# Patient Record
Sex: FEMALE | Race: WHITE | NOT HISPANIC OR LATINO | Employment: FULL TIME | URBAN - METROPOLITAN AREA
[De-identification: names, ages, dates, MRNs, and addresses within clinical notes are randomized per-mention and may not be internally consistent; named-entity substitution may affect disease eponyms.]

---

## 2017-03-09 ENCOUNTER — ALLSCRIPTS OFFICE VISIT (OUTPATIENT)
Dept: OTHER | Facility: OTHER | Age: 52
End: 2017-03-09

## 2017-03-15 ENCOUNTER — GENERIC CONVERSION - ENCOUNTER (OUTPATIENT)
Dept: OTHER | Facility: OTHER | Age: 52
End: 2017-03-15

## 2017-03-15 LAB
BUN SERPL-MCNC: 13 MG/DL
CHOLEST SERPL-MCNC: 198 MG/DL
CREAT SERPL-MCNC: 0.82 MG/DL
GLUCOSE SERPL-MCNC: 82 MG/DL
HDLC SERPL-MCNC: 64 MG/DL
LDLC SERPL CALC-MCNC: 110 MG/DL
POTASSIUM SERPL-SCNC: 4.5 MMOL/L
SODIUM SERPL-SCNC: 144 MMOL/L
TRIGL SERPL-MCNC: 119 MG/DL

## 2017-03-27 ENCOUNTER — TRANSCRIBE ORDERS (OUTPATIENT)
Dept: ADMINISTRATIVE | Facility: HOSPITAL | Age: 52
End: 2017-03-27

## 2017-03-27 DIAGNOSIS — E04.9 ENLARGEMENT OF THYROID: Primary | ICD-10-CM

## 2017-03-27 DIAGNOSIS — R13.10 PROBLEMS WITH SWALLOWING AND MASTICATION: ICD-10-CM

## 2017-04-03 ENCOUNTER — HOSPITAL ENCOUNTER (OUTPATIENT)
Dept: ULTRASOUND IMAGING | Facility: HOSPITAL | Age: 52
Discharge: HOME | End: 2017-04-03
Payer: COMMERCIAL

## 2017-04-03 DIAGNOSIS — R13.10 PROBLEMS WITH SWALLOWING AND MASTICATION: ICD-10-CM

## 2017-04-03 DIAGNOSIS — E04.9 ENLARGEMENT OF THYROID: ICD-10-CM

## 2017-04-03 DIAGNOSIS — E04.9 NON-TOXIC GOITER: ICD-10-CM

## 2017-04-03 PROCEDURE — 76536 US EXAM OF HEAD AND NECK: CPT

## 2017-04-25 ENCOUNTER — GENERIC CONVERSION - ENCOUNTER (OUTPATIENT)
Dept: OTHER | Facility: OTHER | Age: 52
End: 2017-04-25

## 2017-08-07 ENCOUNTER — ALLSCRIPTS OFFICE VISIT (OUTPATIENT)
Dept: OTHER | Facility: OTHER | Age: 52
End: 2017-08-07

## 2017-11-09 ENCOUNTER — OFFICE VISIT (OUTPATIENT)
Dept: URGENT CARE | Facility: CLINIC | Age: 52
End: 2017-11-09
Payer: COMMERCIAL

## 2017-11-09 PROCEDURE — 99213 OFFICE O/P EST LOW 20 MIN: CPT

## 2017-11-11 NOTE — PROGRESS NOTES
Assessment    1  Allergic dermatitis (162 9) (L23 9)    Discussion/Summary  Discussion Summary:   Follow up with the PCPmeds zyrtec or allegra dailybenadryl at nightcream cerve to the body  Medication Side Effects Reviewed: Possible side effects of new medications were reviewed with the patient/guardian today  Understands and agrees with treatment plan: The treatment plan was reviewed with the patient/guardian  The patient/guardian understands and agrees with the treatment plan   Counseling Documentation With Imm: The patient was counseled regarding instructions for management,-- patient and family education,-- importance of compliance with treatment  Follow Up Instructions: Follow Up with your Primary Care Provider in 1-2 days  If your symptoms worsen, go to the nearest [place] [address] Emergency Department  Chief Complaint    1  Rash  Chief Complaint Free Text Note Form: Pt states she was at the bed and breakfast October 1  Since the she feels she has a rash on hands , arms, legs and abdomen  History of Present Illness  HPI: 47 yo female who is here today for constant itching since the end of September after being at a bed and breakfast and has been scratching and itching for the past 1 month  She is non stop scratching  Her  has no scratching or itching noted, She slept in her daughters bed the other night and daughter came home and states her daughter is itching  pt has no signs of scabies, blisters, welts, bed bugs noted or other skin infection noted at this time  Hospital Based Practices Required Assessment:  Pain Assessment  the patient states they do not have pain  (on a scale of 0 to 10, the patient rates the pain at 0 )  Abuse And Domestic Violence Screen   Yes, the patient is safe at home  -- The patient states no one is hurting them  Depression And Suicide Screen  No, the patient has not had thoughts of hurting themself  No, the patient has not felt depressed in the past 7 days  Prefered Language is  [place]  Primary Language is  English  Rash: Sally Severino presents with complaints of rash  Review of Systems  Focused-Female:  Constitutional: as noted in HPI  Integumentary: itching-- and-- dry skin, but-- as noted in HPI-- and-- no rashes  ROS Reviewed:   ROS reviewed  Active Problems  1  Active advance directive (V49 89) (Z78 9)   2  Chronic pain (338 29) (G89 29)   3  GERD without esophagitis (530 81) (K21 9)   4  IBS (irritable bowel syndrome) (564 1) (K58 9)   5  Iron deficiency (280 9) (E61 1)   6  Denied: History of Mental health problem   7  Migraine headache (346 90) (G43 909)   8  Otitis media (382 9) (H66 90)   9  Retinitis pigmentosa (362 74) (H35 52)   10  Denied: History of Substance abuse   11  Supraventricular tachycardia (427 89) (I47 1)   12  Vaginal candidiasis (112 1) (B37 3)    Past Medical History  1  Acute nasopharyngitis (460) (J00)   2  History of Bronchitis (490) (J40)   3  History of Dysphagia (787 20) (R13 10)   4  History of Hip pain (719 45) (M25 559)   5  History of Labyrinthitis (386 30) (H83 09)   6  History of Laryngitis (464 00) (J04 0)   7  Denied: History of Mental health problem   8  History of Overweight (278 02) (E66 3)   9  Denied: History of Substance abuse  Active Problems And Past Medical History Reviewed: The active problems and past medical history were reviewed and updated today  Family History  Mother    1  Family history of Lung cancer   2  No family history of mental disorder  Father    3  No family history of mental disorder  Sister    4  Family history of Substance abuse in family  Grandfather    5  Family history of Aortic Valve Replacement  Family History Reviewed: The family history was reviewed and updated today         Social History   · Active advance directive (V49 89) (Z78 9)   · Dental care, regularly   · Lives with spouse   · Living Situation: Supportive and safe   ·    · Never a smoker   · History of Never a smoker   · No alcohol use   · Non-smoker (V49 89) (Z78 9)   · Occupation   · Racial background   · Social drinker (V49 89) (Z78 9)  Social History Reviewed: The social history was reviewed and updated today  The social history was reviewed and is unchanged  Surgical History    1  History of Anal Sphincteroplasty   2  History of Hysterectomy   3  History of Tonsillectomy  Surgical History Reviewed: The surgical history was reviewed and updated today  Current Meds   1  Cefuroxime Axetil 250 MG Oral Tablet; one bid for 10 days; Therapy: 48Plf5281 to (Last Rx:17Qda2452)  Requested for: 07Aug2017 Ordered   2  Clotrimazole-Betamethasone 1-0 05 % External Cream; APPLY TO AFFECTED AREA TWICE A DAY; Therapy: 46CNV2963 to (Last Rx:73Qgy4531)  Requested for: 07Aug2017 Ordered   3  Estradiol 0 5 MG Oral Tablet; Take 1 tablet daily; Therapy: 99RNU1430 to Recorded   4  Fluconazole 150 MG Oral Tablet; TAKE 1 TABLET 1 TIME ONLY; Therapy: 19Xko6989 to (Evaluate:04Yxt3578)  Requested for: 25Bsx5050; Last Rx:18Uno5638 Ordered   5  PriLOSEC 20 MG CPDR; take 1 BID; Therapy: (Recorded:80Uku0644) to Recorded   6  Synthroid 88 MCG Oral Tablet; 1 Tab daily; Therapy: (Recorded:09Mar2017) to Recorded   7  Verapamil HCl  MG Oral Capsule Extended Release 24 Hour; Take 1 capsule by mouth  daily; Therapy: (Recorded:09Mar2017) to Recorded   8  ZOLMitriptan 2 5 MG Oral Tablet Disintegrating; 1 tablet daily as needed; Therapy: 11HGN3842 to (Evaluate:22Qwi5312)  Requested for: 26Apr2017; Last Rx:25Apr2017 Ordered  Medication List Reviewed: The medication list was reviewed and updated today  Allergies  1  Prevacid    2  Other   3   Latex    Vitals  Signs   Recorded: 47VKL6224 01:33PM   Temperature: 98 F  Heart Rate: 82  Respiration: 16  Systolic: 085  Diastolic: 72  Height: 5 ft 4 in  Weight: 179 lb   BMI Calculated: 30 73  BSA Calculated: 1 87  O2 Saturation: 98  Pain Scale: 0    Physical Exam   Constitutional General appearance: No acute distress, well appearing and well nourished  Musculoskeletal  Gait and station: Normal    Skin  Skin and subcutaneous tissue: Abnormal  -- scratching continuously, no redness, no hives, no blisters, no erythema to the skin, no evidence of scabies, no open wounds  Neurologic  Sensation: No sensory loss  Psychiatric  Orientation to person, place, and time: Normal    Mood and affect: Normal        Signatures   Electronically signed by :  MANDY Sexton; Nov 9 2017  6:10PM EST                       (Author)    Electronically signed by : Lynne Hammonds DO; Nov 10 2017  4:49PM EST                       (Co-author)

## 2017-11-14 ENCOUNTER — ALLSCRIPTS OFFICE VISIT (OUTPATIENT)
Dept: OTHER | Facility: OTHER | Age: 52
End: 2017-11-14

## 2017-11-15 NOTE — PROGRESS NOTES
Assessment    1  Rash (782 1) (R21)   2  Supraventricular tachycardia (427 89) (I47 1)    Plan  Rash    · Permethrin 5 % External Cream; MASSAGE INTO SKIN FROM HEAD TO SOLES OFFEET  [address] OFF AFTER 8-14 HOURS    Chief Complaint  Pt is c/o an itchy random spots that started after she visited a bed and breakfast in late sept  The itching has gotten progressively worse  Medications are current  DD      History of Present Illness  Rash: The patient is being seen for an initial evaluation of a rash  Symptoms:  rash  The patient describes the rash as red  Onset was gradual 2 week(s) ago  Symptoms are severe  Exacerbating factors:  worse at night  No exacerbating factors are noted  No relieving factors are noted  Current treatment includes antihistamine didn't help  Review of Systems   Constitutional: no fever-- and-- no chills  Cardiovascular: no palpitations  Respiratory: no shortness of breath  Integumentary: as noted in HPI  Active Problems  1  Allergic dermatitis (692 9) (L23 9)   2  GERD without esophagitis (530 81) (K21 9)   3  IBS (irritable bowel syndrome) (564 1) (K58 9)   4  Iron deficiency (280 9) (E61 1)   5  Migraine headache (346 90) (G43 909)   6  Retinitis pigmentosa (362 74) (H35 52)   7  Denied: History of Substance abuse   8  Supraventricular tachycardia (427 89) (I47 1)    Past Medical History  Active Problems And Past Medical History Reviewed: The active problems and past medical history were reviewed and updated today  Social History     · Dental care, regularly   · Lives with spouse   · Living Situation: Supportive and safe   ·    · Never a smoker   · History of Never a smoker   · No alcohol use   · Non-smoker (V49 89) (Z78 9)   · 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   · Racial background   · white   · Social drinker (V49 89) (Z78 9)    Current Meds   1  Cefuroxime Axetil 250 MG Oral Tablet; one bid for 10 days; Therapy: 95Vwe9886 to (Last Rx:47Iqr4563)  Requested for: 20Ngf6285 Ordered   2  Clotrimazole-Betamethasone 1-0 05 % External Cream; APPLY TO AFFECTED AREA TWICE A DAY; Therapy: 08XUE7295 to (Last Rx:06Jvs7595)  Requested for: 31Evb4174 Ordered   3  Estradiol 0 5 MG Oral Tablet; Take 1 tablet daily; Therapy: 42FMS1239 to Recorded   4  Fluconazole 150 MG Oral Tablet; TAKE 1 TABLET 1 TIME ONLY; Therapy: 92Dpr6856 to (Evaluate:70Gqg4747)  Requested for: 58Ajz8707; Last Rx:33Nif8483 Ordered   5  PriLOSEC 20 MG CPDR; take 1 BID; Therapy: (Recorded:03Mtb8500) to Recorded   6  Synthroid 88 MCG Oral Tablet; 1 Tab daily; Therapy: (Recorded:09Mar2017) to Recorded   7  Verapamil HCl  MG Oral Capsule Extended Release 24 Hour; Take 1 capsule by mouth  daily; Therapy: (Recorded:09Mar2017) to Recorded   8  ZOLMitriptan 2 5 MG Oral Tablet Disintegrating; 1 tablet daily as needed; Therapy: 15NKD0724 to (Evaluate:67Nki9079)  Requested for: 26Apr2017; Last Rx:25Apr2017 Ordered    The medication list was reviewed and updated today  Allergies  1  Prevacid  2  Other   3  Latex    Vitals   Recorded: 97SJA9140 11:28AM   Heart Rate 72, R Radial   Pulse Quality Normal, R Radial   Systolic 078, LUE, Sitting   Diastolic 68, LUE, Sitting   Height 5 ft 4 in   Weight 183 lb    BMI Calculated 31 41   BSA Calculated 1 88       Physical Exam   Constitutional  General appearance: No acute distress, well appearing and well nourished  Pulmonary  Auscultation of lungs: Clear to auscultation  Cardiovascular  Auscultation of heart: Normal rate and rhythm, normal S1 and S2, without murmurs  Skin  Examination of the skin for lesions: Abnormal  -- erythematous papules on the hands, between fingers, abdominal wall          Signatures   Electronically signed by : RUSS Colon ; Nov 14 2017 11:44AM EST                       (Author)

## 2018-01-13 VITALS
SYSTOLIC BLOOD PRESSURE: 110 MMHG | BODY MASS INDEX: 30.56 KG/M2 | HEART RATE: 72 BPM | WEIGHT: 179 LBS | HEIGHT: 64 IN | TEMPERATURE: 98.2 F | DIASTOLIC BLOOD PRESSURE: 68 MMHG | RESPIRATION RATE: 16 BRPM

## 2018-01-13 NOTE — PROGRESS NOTES
Assessment    1  Encounter for preventive health examination (V70 0) (Z00 00)   2  GERD without esophagitis (530 81) (K21 9)   3  Screening for lipoid disorders (V77 91) (Z13 220)    Plan  GERD without esophagitis    · 1 Sandra Esquivel MD, [facility] Physician Referral  Consult Only: the expectation  is that the referring provider will communicate back to the patient on treatment options  Evaluation and Treatment: the expectation is that the referred to provider will  communicate back to the patient on treatment options  Status: Hold For - Scheduling   Requested for: 33LVF9131  Care Summary provided  : Yes  PMH: History of upper respiratory infection    · Advair Diskus 250-50 MCG/DOSE Inhalation Aerosol Powder Breath Activated  Screening for lipoid disorders    · ([facility]) CMP14+eGFR; Status:Active; Requested IVM:56WRA0980;    · ([facility]) Lipid Panel; Status:Active; Requested for:09Mar2017;     Discussion/Summary  health maintenance visit Currently, she eats a healthy diet  cervical cancer screening is not indicated Breast cancer screening: the risks and benefits of breast cancer screening were discussed and mammogram is current  Colorectal cancer screening: the risks and benefits of colorectal cancer screening were discussed and colorectal cancer screening is current  Screening lab work includes glucose and lipid profile  The risks and benefits of immunizations were discussed and immunizations are up to date  Advice and education were given regarding nutrition and aerobic exercise  Patient discussion: discussed with the patient  Chief Complaint  Pt is here today for a yearly check up visit Pt is requesting an ENT referral, and an order for a chest x-ray  Pt regularly sees her cardiologist and endocrinologist between office visits  DD      History of Present Illness  HM, Adult Female: The patient is being seen for a health maintenance evaluation  General Health:  The patient's health since the last visit is described as good  She has regular dental visits  She complains of vision problems (has retinitis pigmentosa)  Vision care includes wearing glasses  She denies hearing loss  Immunizations status: up to date  Lifestyle:  She consumes a diverse and healthy diet  She has weight concerns  She does not exercise regularly  She does not use tobacco  She denies alcohol use  She denies drug use  Screening:      Review of Systems    Constitutional: no fever and no chills  Eyes: eyesight problems  ENT: no hearing loss  Cardiovascular: palpitations, but no chest pain  Respiratory: no shortness of breath and no cough  Gastrointestinal: dysphagia and heartburn, but no abdominal pain, no constipation and no blood in stools  Genitourinary: no dysuria and no incontinence  Integumentary: no rashes  Neurological: no headache  Psychiatric: no anxiety and no depression  Active Problems    1  Chronic pain (338 29) (G89 29)   2  IBS (irritable bowel syndrome) (564 1) (K58 9)   3  Iron deficiency (280 9) (E61 1)   4  Migraine headache (346 90) (G43 909)   5  Retinitis pigmentosa (362 74) (H35 52)   6  Screening for breast cancer (V76 10) (Z12 39)   7   Screening for colon cancer (V76 51) (Z12 11)    Past Medical History    · Acute nasopharyngitis (460) (J00)   · History of Bronchitis (490) (J40)   · History of Dysphagia (787 20) (R13 10)   · History of Hip pain (719 45) (M25 559)   · History of Labyrinthitis (386 30) (H83 09)   · History of Laryngitis (464 00) (J04 0)   · History of Otitis media (382 9) (H66 90)   · History of Overweight (278 02) (E66 3)    Surgical History    · History of Anal Sphincteroplasty   · History of Hysterectomy   · History of Tonsillectomy    Family History  Mother    · Family history of Lung cancer  Grandfather    · Family history of Aortic Valve Replacement    Social History    ·    · No alcohol use   · Non-smoker (V49 89) (Z78 9)   · 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   · Racial background   · white    Current Meds   1  Advair Diskus 250-50 MCG/DOSE Inhalation Aerosol Powder Breath Activated; INHALE   1 PUFFS Every twelve hours; Therapy: 28NLZ5696 to (Last Rx:66Peo9280)  Requested for: 43CQT7117 Ordered   2  Clotrimazole-Betamethasone 1-0 05 % External Cream; APPLY TO AFFECTED AREA   TWICE A DAY; Therapy: 07MWO3660 to (Evaluate:17Jun2015)  Requested for: 09UMD4233; Last   JW:98AAB6098 Ordered   3  Estradiol 0 5 MG Oral Tablet; Take 1 tablet daily; Therapy: 99XTL7881 to Recorded   4  Fluticasone Propionate 50 MCG/ACT Nasal Suspension; INSTILL 1 SQUIRT Twice daily; Therapy: 27LGD7062 to (Last Rx:95Vgl4736)  Requested for: 39YPI8700 Ordered   5  PriLOSEC 20 MG CPDR; take 1 BID; Therapy: (Recorded:09Dec2015) to Recorded   6  Synthroid 88 MCG Oral Tablet; 1 Tab daily; Therapy: (Recorded:09Mar2017) to Recorded   7  Verapamil HCl  MG Oral Capsule Extended Release 24 Hour; Take 1 capsule by   mouth  daily; Therapy: (Recorded:09Mar2017) to Recorded   8  ZOLMitriptan 2 5 MG Oral Tablet Dispersible; 1 tablet daily as needed; Therapy: 51CNV4611 to (Evaluate:02Jan2016)  Requested for: 93XKY9836; Last   Rx:08Gug7048 Ordered    Allergies    1  Prevacid    2  Other   3  Latex    Vitals   Recorded: 19VES9149 01:24PM   Heart Rate 72, L Radial   Pulse Quality Normal, L Radial   Systolic 627, LUE, Sitting   Diastolic 64, LUE, Sitting   Height 5 ft 4 in   Weight 189 lb 5 oz   BMI Calculated 32 5   BSA Calculated 1 91     Physical Exam    Constitutional   General appearance: Abnormal   comfortable and obese  Ears, Nose, Mouth, and Throat   Otoscopic examination: Tympanic membranes translucent with normal light reflex  Canals patent without erythema  Oropharynx: Normal with no erythema, edema, exudate or lesions  Neck   Neck: Supple, symmetric, trachea midline, no masses  Thyroid: Normal, no thyromegaly  Pulmonary   Auscultation of lungs: Clear to auscultation      Cardiovascular Auscultation of heart: Normal rate and rhythm, normal S1 and S2, no murmurs  Abdomen   Abdomen: Non-tender, no masses  Lymphatic   Palpation of lymph nodes in neck: No lymphadenopathy  Musculoskeletal   Gait and station: Normal     Psychiatric   Judgment and insight: Normal     Orientation to person, place, and time: Normal     Mood and affect: Normal        Results/Data  PHQ-2 Adult Depression Screening 00CBV4265 01:28PM User, Ahs     Test Name Result Flag Reference   PHQ-2 Adult Depression Score 0     Over the last two weeks, how often have you been bothered by any of the following problems?   Little interest or pleasure in doing things: Not at all - 0  Feeling down, depressed, or hopeless: Not at all - 0   PHQ-2 Adult Depression Screening Negative         Signatures   Electronically signed by : RUSS Morin ; Mar  9 2017  1:47PM EST                       (Author)

## 2018-01-14 VITALS
HEART RATE: 72 BPM | DIASTOLIC BLOOD PRESSURE: 68 MMHG | HEIGHT: 64 IN | SYSTOLIC BLOOD PRESSURE: 100 MMHG | WEIGHT: 183 LBS | BODY MASS INDEX: 31.24 KG/M2

## 2018-01-14 VITALS
HEART RATE: 72 BPM | HEIGHT: 64 IN | SYSTOLIC BLOOD PRESSURE: 110 MMHG | BODY MASS INDEX: 32.32 KG/M2 | WEIGHT: 189.31 LBS | DIASTOLIC BLOOD PRESSURE: 64 MMHG

## 2018-01-22 ENCOUNTER — TRANSCRIBE ORDERS (OUTPATIENT)
Dept: ADMINISTRATIVE | Facility: HOSPITAL | Age: 53
End: 2018-01-22

## 2018-01-22 DIAGNOSIS — Z12.39 BREAST SCREENING: Primary | ICD-10-CM

## 2018-01-23 ENCOUNTER — HOSPITAL ENCOUNTER (OUTPATIENT)
Dept: BONE DENSITY | Facility: IMAGING CENTER | Age: 53
Discharge: HOME | End: 2018-01-23
Payer: COMMERCIAL

## 2018-01-23 DIAGNOSIS — Z12.39 BREAST SCREENING: ICD-10-CM

## 2018-01-23 DIAGNOSIS — Z12.31 ENCOUNTER FOR SCREENING MAMMOGRAM FOR MALIGNANT NEOPLASM OF BREAST: ICD-10-CM

## 2018-01-23 PROCEDURE — 77067 SCR MAMMO BI INCL CAD: CPT

## 2018-04-26 ENCOUNTER — TELEPHONE (OUTPATIENT)
Dept: FAMILY MEDICINE CLINIC | Facility: HOSPITAL | Age: 53
End: 2018-04-26

## 2018-04-26 NOTE — TELEPHONE ENCOUNTER
[facility] called [facility]  Looking for labs on pt more recent than mar 2017-  There are no current labs in chart  I faxed info to [facility] stating such  Fax [phone]    dk

## 2018-06-08 DIAGNOSIS — G43.009 MIGRAINE WITHOUT AURA AND WITHOUT STATUS MIGRAINOSUS, NOT INTRACTABLE: Primary | ICD-10-CM

## 2018-06-08 PROBLEM — I47.1 SUPRAVENTRICULAR TACHYCARDIA (HCC): Status: ACTIVE | Noted: 2017-03-09

## 2018-06-08 PROBLEM — I47.10 SUPRAVENTRICULAR TACHYCARDIA: Status: ACTIVE | Noted: 2017-03-09

## 2018-06-08 PROBLEM — K21.9 GERD WITHOUT ESOPHAGITIS: Status: ACTIVE | Noted: 2017-03-09

## 2018-06-08 RX ORDER — ZOLMITRIPTAN 2.5 MG/1
1 TABLET, ORALLY DISINTEGRATING ORAL DAILY PRN
COMMUNITY
Start: 2014-11-03 | End: 2018-06-08 | Stop reason: SDUPTHER

## 2018-06-08 RX ORDER — ZOLMITRIPTAN 2.5 MG/1
2.5 TABLET, ORALLY DISINTEGRATING ORAL DAILY PRN
Qty: 6 TABLET | Refills: 0 | Status: SHIPPED | OUTPATIENT
Start: 2018-06-08 | End: 2018-08-07 | Stop reason: SDUPTHER

## 2018-08-07 DIAGNOSIS — G43.009 MIGRAINE WITHOUT AURA AND WITHOUT STATUS MIGRAINOSUS, NOT INTRACTABLE: ICD-10-CM

## 2018-08-07 RX ORDER — ZOLMITRIPTAN 2.5 MG/1
2.5 TABLET, ORALLY DISINTEGRATING ORAL DAILY PRN
Qty: 6 TABLET | Refills: 2 | Status: SHIPPED | OUTPATIENT
Start: 2018-08-07 | End: 2019-04-15 | Stop reason: SDUPTHER

## 2018-09-12 DIAGNOSIS — E03.9 HYPOTHYROIDISM, UNSPECIFIED TYPE: Primary | ICD-10-CM

## 2018-09-12 RX ORDER — LEVOTHYROXINE SODIUM 88 UG/1
TABLET ORAL
Qty: 90 TABLET | Refills: 4 | Status: SHIPPED | OUTPATIENT
Start: 2018-09-12 | End: 2019-01-15 | Stop reason: SDUPTHER

## 2018-12-20 DIAGNOSIS — G43.009 MIGRAINE WITHOUT AURA AND WITHOUT STATUS MIGRAINOSUS, NOT INTRACTABLE: ICD-10-CM

## 2018-12-20 RX ORDER — ZOLMITRIPTAN 2.5 MG/1
2.5 TABLET, ORALLY DISINTEGRATING ORAL DAILY PRN
Qty: 18 TABLET | Refills: 1 | OUTPATIENT
Start: 2018-12-20

## 2019-01-14 ENCOUNTER — TRANSCRIBE ORDERS (OUTPATIENT)
Dept: ADMINISTRATIVE | Facility: HOSPITAL | Age: 54
End: 2019-01-14

## 2019-01-14 DIAGNOSIS — Z12.39 SCREENING BREAST EXAMINATION: Primary | ICD-10-CM

## 2019-01-15 ENCOUNTER — OFFICE VISIT (OUTPATIENT)
Dept: ENDOCRINOLOGY | Facility: HOSPITAL | Age: 54
End: 2019-01-15
Payer: COMMERCIAL

## 2019-01-15 VITALS
WEIGHT: 195.2 LBS | HEIGHT: 65 IN | DIASTOLIC BLOOD PRESSURE: 68 MMHG | SYSTOLIC BLOOD PRESSURE: 108 MMHG | HEART RATE: 80 BPM | BODY MASS INDEX: 32.52 KG/M2

## 2019-01-15 DIAGNOSIS — E03.8 HYPOTHYROIDISM DUE TO HASHIMOTO'S THYROIDITIS: Primary | ICD-10-CM

## 2019-01-15 DIAGNOSIS — E04.9 GOITER: ICD-10-CM

## 2019-01-15 DIAGNOSIS — E06.3 HYPOTHYROIDISM DUE TO HASHIMOTO'S THYROIDITIS: Primary | ICD-10-CM

## 2019-01-15 DIAGNOSIS — E03.9 HYPOTHYROIDISM, UNSPECIFIED TYPE: ICD-10-CM

## 2019-01-15 PROCEDURE — 99215 OFFICE O/P EST HI 40 MIN: CPT | Performed by: INTERNAL MEDICINE

## 2019-01-15 RX ORDER — CALCIUM CITRATE/VITAMIN D3 200MG-6.25
2 TABLET ORAL DAILY
COMMUNITY

## 2019-01-15 RX ORDER — LEVOTHYROXINE SODIUM 88 UG/1
88 TABLET ORAL DAILY
Qty: 90 TABLET | Refills: 3 | Status: SHIPPED | OUTPATIENT
Start: 2019-01-15 | End: 2020-01-16 | Stop reason: SDUPTHER

## 2019-01-15 RX ORDER — SPIRONOLACTONE 25 MG
TABLET ORAL DAILY
COMMUNITY

## 2019-01-15 RX ORDER — IBUPROFEN 200 MG
600 TABLET ORAL EVERY 6 HOURS PRN
COMMUNITY

## 2019-01-15 RX ORDER — ESTRADIOL 0.5 MG/1
0.5 TABLET ORAL DAILY
COMMUNITY

## 2019-01-15 RX ORDER — OMEPRAZOLE 40 MG/1
40 CAPSULE, DELAYED RELEASE ORAL 2 TIMES DAILY
COMMUNITY
Start: 2018-12-22 | End: 2019-06-26 | Stop reason: SDUPTHER

## 2019-01-15 RX ORDER — ASPIRIN 81 MG/1
81 TABLET ORAL
COMMUNITY
End: 2020-01-16 | Stop reason: ALTCHOICE

## 2019-01-15 NOTE — PATIENT INSTRUCTIONS
Thyroid blood work is normal   Continue the same levothyroxine 88 mcg daily  Continue to work on diet and exercise  Follow up in 1 year with blood work

## 2019-01-15 NOTE — LETTER
January 15, 2019     MD [facility]  [address] [facility] [address]    Patient: Daxa Salazar   YOB: 1965   Date of Visit: 1/15/2019       Dear Dr Star Villarreal: Thank you for referring Bala Branch to me for evaluation  Below are my notes for this consultation  If you have questions, please do not hesitate to call me  I look forward to following your patient along with you  Sincerely,        Geovany Vela MD        CC: No Recipients  Geovany Vela MD  1/15/2019 10:30 AM  Sign at close encounter  1/15/2019    Assessment/Plan      Diagnoses and all orders for this visit:    Hypothyroidism due to Hashimoto's thyroiditis  -     TSH, 3rd generation Lab Collect; Future  -     T4, free Lab Collect; Future  -     Thyroid Antibodies Panel Lab Collect; Future    Goiter  -     TSH, 3rd generation Lab Collect; Future  -     T4, free Lab Collect; Future  -     Thyroid Antibodies Panel Lab Collect; Future    Hypothyroidism, unspecified type  -     levothyroxine 88 mcg tablet; Take 1 tablet (88 mcg total) by mouth daily    Other orders  -     aspirin (ECOTRIN LOW STRENGTH) 81 mg EC tablet; Take 81 mg by mouth  -     omeprazole (PriLOSEC) 40 MG capsule; Take 40 mg by mouth 2 (two) times a day    -     estradiol (ESTRACE) 0 5 MG tablet; Take 0 5 mg by mouth  -     verapamil (CALAN-SR) 120 mg CR tablet; Take 120 mg by mouth  -     ibuprofen (MOTRIN) 200 mg tablet; Take 600 mg by mouth every 6 (six) hours as needed for mild pain  -     Multiple Vitamins-Minerals (MULTIVITAMIN GUMMIES WOMENS) CHEW; Chew 2 each daily  -     Lutein 20 MG CAPS; Take by mouth daily        Assessment/Plan:  1  Hypothyroidism due to Hashimoto's thyroiditis  Most recent thyroid function tests do show a normal TSH  She is biochemically euthyroid  She will continue the same levothyroxine 88 mcg daily  2   History of thyroid goiter  Based on my palpation, her thyroid is not enlarged    This will be followed over time  Last thyroid ultrasound was in 2017 was stable with only a very mild goiter  I have asked her to follow up in 1 year with preceding TSH, free T4, and thyroid antibody panel  CC:   Hypothyroid follow-up    History of Present Illness     HPI: Mendel Bora is a 48y o  year old female with history of hypothyroidism due to Hashimoto's thyroiditis with goiter  She was diagnosed with Hashimoto's thyroiditis in 2003  TSH of that point was over 7 and thyroid hormone was started in dose was adjusted over the years  She is currently on levothyroxine 88 mcg daily for several years  She denies heat or cold intolerance, palpitation, tremors, diarrhea, anxiety or depression  She is the constipated side  She does have some sleeping issues in that she will have difficulty falling back to sleep when she wakes urinate as her mind starts to race  She is thus fatigued  She is the heaviest she has been in years but is pretty stable from last year  She is working on dieting now by limiting carbohydrates  She has no dry skin except on the hands and hair loss, but no brittle nails  She has no diplopia  She does have some problems with choking on saliva in certain foods and has seen an ENT with no abnormalities  She denies any history of head or neck irradiation in the past     Review of Systems   Constitutional: Positive for fatigue and unexpected weight change  Starting to diet and decreasing carbs as daughter getting  in April  HENT: Positive for tinnitus and trouble swallowing  Negative for hearing loss  No XRT to the head or neck in the past  Has coughing with eating at meals  Will choke on own saliva  Has tinnitus  Saw ENT in the past    Eyes: Positive for visual disturbance  No diplopia  Wears glasses  She has decreased vision with retinitis pigmentosa  Respiratory: Negative for chest tightness and shortness of breath      Cardiovascular: Negative for chest pain, palpitations and leg swelling  Has a history of SVT  Will rarely get symptoms with palpitations  Gastrointestinal: Positive for constipation  Negative for abdominal pain, diarrhea and nausea  Gets reflux symptoms at times  If loose bowel movements  , will ooze fecal material due to sphincter surgery in the past    Endocrine: Negative for cold intolerance and heat intolerance  Will feel hot at night after urinating at night  Nocturia twice a night  Musculoskeletal: Positive for arthralgias and back pain  Wrists, knees, back neck hips pain  Skin: Negative for rash  No dry skin except hands  No brittle nails  Has hair loss  Neurological: Positive for headaches  Negative for dizziness, tremors, light-headedness and numbness  Headaches migraines several times a month  Psychiatric/Behavioral: Positive for sleep disturbance  Negative for dysphoric mood  The patient is not nervous/anxious  Insomnia and once wakes to urinate, will have trouble getting back to sleep as mind races         Historical Information   Past Medical History:   Diagnosis Date    Endometrial hyperplasia     Retinitis pigmentosa      Past Surgical History:   Procedure Laterality Date    ANAL/SPHINCTEROPLASTY      ANTERIOR AND POSTERIOR VAGINAL REPAIR      BLADDER SUSPENSION      DILATION AND CURETTAGE OF UTERUS      X 2    TONSILLECTOMY AND ADENOIDECTOMY      TOTAL ABDOMINAL HYSTERECTOMY W/ BILATERAL SALPINGOOPHORECTOMY       Social History   History   Alcohol Use    Yes     Comment: rare     History   Drug Use No     History   Smoking Status    Never Smoker   Smokeless Tobacco    Never Used     Family History:   Family History   Problem Relation Age of Onset    Cancer Mother         lung    COPD Father     Diabetes type II Brother         on insulin    Substance Abuse Sister    Adán Pert HIV Sister     No Known Problems Daughter        Meds/Allergies   Current Outpatient Prescriptions Medication Sig Dispense Refill    aspirin (ECOTRIN LOW STRENGTH) 81 mg EC tablet Take 81 mg by mouth      estradiol (ESTRACE) 0 5 MG tablet Take 0 5 mg by mouth      ibuprofen (MOTRIN) 200 mg tablet Take 600 mg by mouth every 6 (six) hours as needed for mild pain      levothyroxine 88 mcg tablet Take 1 tablet (88 mcg total) by mouth daily 90 tablet 3    Lutein 20 MG CAPS Take by mouth daily      Multiple Vitamins-Minerals (MULTIVITAMIN GUMMIES WOMENS) CHEW Chew 2 each daily      omeprazole (PriLOSEC) 40 MG capsule Take 40 mg by mouth 2 (two) times a day        verapamil (CALAN-SR) 120 mg CR tablet Take 120 mg by mouth      ZOLMitriptan (ZOMIG-ZMT) 2 5 MG disintegrating tablet Take 1 tablet (2 5 mg total) by mouth daily as needed for migraine 6 tablet 2     No current facility-administered medications for this visit  Allergies   Allergen Reactions    Latex Rash       Objective   Vitals: Blood pressure 108/68, pulse 80, height 5' 5" (1 651 m), weight 88 5 kg (195 lb 3 2 oz)  Invasive Devices          No matching active lines, drains, or airways          Physical Exam   Constitutional: She is oriented to person, place, and time  She appears well-developed and well-nourished  HENT:   Head: Normocephalic and atraumatic  Eyes: Pupils are equal, round, and reactive to light  Conjunctivae and EOM are normal    No lid lag, stare, proptosis, or periorbital edema  Neck: Normal range of motion  Neck supple  No thyromegaly present  Thyroid irregular and full, but not enlarged and there were no palpable nodules  No bruits over the thyroid gland or carotids  Cardiovascular: Normal rate, regular rhythm, normal heart sounds and intact distal pulses  No murmur heard  Pulmonary/Chest: Effort normal and breath sounds normal  She has no wheezes  Abdominal: Soft  Bowel sounds are normal  There is no tenderness  Musculoskeletal: Normal range of motion  She exhibits no edema or deformity     No tremor of the outstretched hands  No spinous process tenderness  No CVA tenderness  Lymphadenopathy:     She has no cervical adenopathy  Neurological: She is alert and oriented to person, place, and time  She has normal reflexes  Skin: Skin is warm and dry  No rash noted  Vitals reviewed  The history was obtained from the review of the chart and from the patient  Lab Results:    Blood work done at [facility] on 09/06/2018 demonstrates a TSH of 2 1  CMP and CBC were normal   Lipid panel demonstrates a total cholesterol 192, triglyceride 175, HDL 52,   THYROID ULTRASOUND was done on 04/03/2017     INDICATION: Thyroid goiter  Swallowing problems  Hashimoto's thyroiditis      COMPARISON: April 19, 2013     TECHNIQUE:   Ultrasound of the thyroid was performed with a high frequency linear transducer in transverse and sagittal planes including volumetric imaging sweeps as well as traditional still imaging technique      FINDINGS:  Markedly heterogeneous thyroid parenchyma consistent with the reported history of Hashimoto's thyroiditis      Right gland:  4 6 x 1 8 x 1 5 cm  No discrete or dominant nodules           Left gland:  4 1 x 1 3 x 1 7 cm  No discrete or dominant nodules           Isthmus: The isthmus is 0 6 cm in AP dimension         Morphologically normal and not significantly enlarged perithyroidal lymph node is identified posterior to the lower pole of the left thyroid lobe  Similar morphologically normal and not significantly enlarged perithyroidal nodes are identified adjacent   to the lateral midportion of the right thyroid lobe      IMPRESSION:     Markedly heterogeneous thyroid parenchyma, somewhat progressed when compared to prior examination  Morphologically normal and not significantly enlarged perithyroidal lymph nodes identified, similar in retrospect when compared to April 19, 2013      No suspicious discrete nodule or mass      No results found for this or any previous visit (from the past 416 [id number] hour(s))        Future Appointments  Date Time Provider Cj Hernandez   2/1/2019 9:30 AM [facility]   1/16/2020 9:40 AM Theresa Ledbetter MD ENDO [facility]

## 2019-01-15 NOTE — PROGRESS NOTES
1/15/2019    Assessment/Plan      Diagnoses and all orders for this visit:    Hypothyroidism due to Hashimoto's thyroiditis  -     TSH, 3rd generation Lab Collect; Future  -     T4, free Lab Collect; Future  -     Thyroid Antibodies Panel Lab Collect; Future    Goiter  -     TSH, 3rd generation Lab Collect; Future  -     T4, free Lab Collect; Future  -     Thyroid Antibodies Panel Lab Collect; Future    Hypothyroidism, unspecified type  -     levothyroxine 88 mcg tablet; Take 1 tablet (88 mcg total) by mouth daily    Other orders  -     aspirin (ECOTRIN LOW STRENGTH) 81 mg EC tablet; Take 81 mg by mouth  -     omeprazole (PriLOSEC) 40 MG capsule; Take 40 mg by mouth 2 (two) times a day    -     estradiol (ESTRACE) 0 5 MG tablet; Take 0 5 mg by mouth  -     verapamil (CALAN-SR) 120 mg CR tablet; Take 120 mg by mouth  -     ibuprofen (MOTRIN) 200 mg tablet; Take 600 mg by mouth every 6 (six) hours as needed for mild pain  -     Multiple Vitamins-Minerals (MULTIVITAMIN GUMMIES WOMENS) CHEW; Chew 2 each daily  -     Lutein 20 MG CAPS; Take by mouth daily        Assessment/Plan:  1  Hypothyroidism due to Hashimoto's thyroiditis  Most recent thyroid function tests do show a normal TSH  She is biochemically euthyroid  She will continue the same levothyroxine 88 mcg daily  2   History of thyroid goiter  Based on my palpation, her thyroid is not enlarged  This will be followed over time  Last thyroid ultrasound was in 2017 was stable with only a very mild goiter  I have asked her to follow up in 1 year with preceding TSH, free T4, and thyroid antibody panel  CC:   Hypothyroid follow-up    History of Present Illness     HPI: Olga Balderas is a 48y o  year old female with history of hypothyroidism due to Hashimoto's thyroiditis with goiter  She was diagnosed with Hashimoto's thyroiditis in 2003  TSH of that point was over 7 and thyroid hormone was started in dose was adjusted over the years    She is currently on levothyroxine 88 mcg daily for several years  She denies heat or cold intolerance, palpitation, tremors, diarrhea, anxiety or depression  She is the constipated side  She does have some sleeping issues in that she will have difficulty falling back to sleep when she wakes urinate as her mind starts to race  She is thus fatigued  She is the heaviest she has been in years but is pretty stable from last year  She is working on dieting now by limiting carbohydrates  She has no dry skin except on the hands and hair loss, but no brittle nails  She has no diplopia  She does have some problems with choking on saliva in certain foods and has seen an ENT with no abnormalities  She denies any history of head or neck irradiation in the past     Review of Systems   Constitutional: Positive for fatigue and unexpected weight change  Starting to diet and decreasing carbs as daughter getting  in April  HENT: Positive for tinnitus and trouble swallowing  Negative for hearing loss  No XRT to the head or neck in the past  Has coughing with eating at meals  Will choke on own saliva  Has tinnitus  Saw ENT in the past    Eyes: Positive for visual disturbance  No diplopia  Wears glasses  She has decreased vision with retinitis pigmentosa  Respiratory: Negative for chest tightness and shortness of breath  Cardiovascular: Negative for chest pain, palpitations and leg swelling  Has a history of SVT  Will rarely get symptoms with palpitations  Gastrointestinal: Positive for constipation  Negative for abdominal pain, diarrhea and nausea  Gets reflux symptoms at times  If loose bowel movements  , will ooze fecal material due to sphincter surgery in the past    Endocrine: Negative for cold intolerance and heat intolerance  Will feel hot at night after urinating at night  Nocturia twice a night  Musculoskeletal: Positive for arthralgias and back pain          Wrists, knees, back neck hips pain  Skin: Negative for rash  No dry skin except hands  No brittle nails  Has hair loss  Neurological: Positive for headaches  Negative for dizziness, tremors, light-headedness and numbness  Headaches migraines several times a month  Psychiatric/Behavioral: Positive for sleep disturbance  Negative for dysphoric mood  The patient is not nervous/anxious  Insomnia and once wakes to urinate, will have trouble getting back to sleep as mind races         Historical Information   Past Medical History:   Diagnosis Date    Endometrial hyperplasia     Retinitis pigmentosa      Past Surgical History:   Procedure Laterality Date    ANAL/SPHINCTEROPLASTY      ANTERIOR AND POSTERIOR VAGINAL REPAIR      BLADDER SUSPENSION      DILATION AND CURETTAGE OF UTERUS      X 2    TONSILLECTOMY AND ADENOIDECTOMY      TOTAL ABDOMINAL HYSTERECTOMY W/ BILATERAL SALPINGOOPHORECTOMY       Social History   History   Alcohol Use    Yes     Comment: rare     History   Drug Use No     History   Smoking Status    Never Smoker   Smokeless Tobacco    Never Used     Family History:   Family History   Problem Relation Age of Onset   [facility] Cancer Mother         lung    COPD Father     Diabetes type II Brother         on insulin    Substance Abuse Sister     HIV Sister     No Known Problems Daughter        Meds/Allergies   Current Outpatient Prescriptions   Medication Sig Dispense Refill    aspirin (ECOTRIN LOW STRENGTH) 81 mg EC tablet Take 81 mg by mouth      estradiol (ESTRACE) 0 5 MG tablet Take 0 5 mg by mouth      ibuprofen (MOTRIN) 200 mg tablet Take 600 mg by mouth every 6 (six) hours as needed for mild pain      levothyroxine 88 mcg tablet Take 1 tablet (88 mcg total) by mouth daily 90 tablet 3    Lutein 20 MG CAPS Take by mouth daily      Multiple Vitamins-Minerals (MULTIVITAMIN GUMMIES WOMENS) CHEW Chew 2 each daily      omeprazole (PriLOSEC) 40 MG capsule Take 40 mg by mouth 2 (two) times a day        verapamil (CALAN-SR) 120 mg CR tablet Take 120 mg by mouth      ZOLMitriptan (ZOMIG-ZMT) 2 5 MG disintegrating tablet Take 1 tablet (2 5 mg total) by mouth daily as needed for migraine 6 tablet 2     No current facility-administered medications for this visit  Allergies   Allergen Reactions    Latex Rash       Objective   Vitals: Blood pressure 108/68, pulse 80, height 5' 5" (1 651 m), weight 88 5 kg (195 lb 3 2 oz)  Invasive Devices          No matching active lines, drains, or airways          Physical Exam   Constitutional: She is oriented to person, place, and time  She appears well-developed and well-nourished  HENT:   Head: Normocephalic and atraumatic  Eyes: Pupils are equal, round, and reactive to light  Conjunctivae and EOM are normal    No lid lag, stare, proptosis, or periorbital edema  Neck: Normal range of motion  Neck supple  No thyromegaly present  Thyroid irregular and full, but not enlarged and there were no palpable nodules  No bruits over the thyroid gland or carotids  Cardiovascular: Normal rate, regular rhythm, normal heart sounds and intact distal pulses  No murmur heard  Pulmonary/Chest: Effort normal and breath sounds normal  She has no wheezes  Abdominal: Soft  Bowel sounds are normal  There is no tenderness  Musculoskeletal: Normal range of motion  She exhibits no edema or deformity  No tremor of the outstretched hands  No spinous process tenderness  No CVA tenderness  Lymphadenopathy:     She has no cervical adenopathy  Neurological: She is alert and oriented to person, place, and time  She has normal reflexes  Skin: Skin is warm and dry  No rash noted  Vitals reviewed  The history was obtained from the review of the chart and from the patient  Lab Results:    Blood work done at [facility] on 09/06/2018 demonstrates a TSH of 2 1    CMP and CBC were normal   Lipid panel demonstrates a total cholesterol 192, triglyceride 175, HDL 52,   THYROID ULTRASOUND was done on 04/03/2017     INDICATION: Thyroid goiter  Swallowing problems  Hashimoto's thyroiditis      COMPARISON: April 19, 2013     TECHNIQUE:   Ultrasound of the thyroid was performed with a high frequency linear transducer in transverse and sagittal planes including volumetric imaging sweeps as well as traditional still imaging technique      FINDINGS:  Markedly heterogeneous thyroid parenchyma consistent with the reported history of Hashimoto's thyroiditis      Right gland:  4 6 x 1 8 x 1 5 cm  No discrete or dominant nodules           Left gland:  4 1 x 1 3 x 1 7 cm  No discrete or dominant nodules           Isthmus: The isthmus is 0 6 cm in AP dimension         Morphologically normal and not significantly enlarged perithyroidal lymph node is identified posterior to the lower pole of the left thyroid lobe  Similar morphologically normal and not significantly enlarged perithyroidal nodes are identified adjacent   to the lateral midportion of the right thyroid lobe      IMPRESSION:     Markedly heterogeneous thyroid parenchyma, somewhat progressed when compared to prior examination  Morphologically normal and not significantly enlarged perithyroidal lymph nodes identified, similar in retrospect when compared to April 19, 2013      No suspicious discrete nodule or mass  No results found for this or any previous visit (from the past 86700 hour(s))        Future Appointments  Date Time Provider Cj Hernandez   2/1/2019 9:30 AM [facility] 1 [facility] [address]   1/16/2020 9:40 AM Marquise Posada MD ENDO [facility]

## 2019-02-01 ENCOUNTER — HOSPITAL ENCOUNTER (OUTPATIENT)
Dept: BONE DENSITY | Facility: IMAGING CENTER | Age: 54
Discharge: HOME | End: 2019-02-01
Payer: COMMERCIAL

## 2019-02-01 VITALS — WEIGHT: 192 LBS | HEIGHT: 65 IN | BODY MASS INDEX: 31.99 KG/M2

## 2019-02-01 DIAGNOSIS — Z12.39 SCREENING BREAST EXAMINATION: ICD-10-CM

## 2019-02-01 PROCEDURE — 77067 SCR MAMMO BI INCL CAD: CPT

## 2019-02-01 PROCEDURE — 77063 BREAST TOMOSYNTHESIS BI: CPT

## 2019-04-15 DIAGNOSIS — G43.009 MIGRAINE WITHOUT AURA AND WITHOUT STATUS MIGRAINOSUS, NOT INTRACTABLE: ICD-10-CM

## 2019-04-15 RX ORDER — ZOLMITRIPTAN 2.5 MG/1
2.5 TABLET, ORALLY DISINTEGRATING ORAL DAILY PRN
Qty: 6 TABLET | Refills: 2 | Status: SHIPPED | OUTPATIENT
Start: 2019-04-15 | End: 2019-09-24 | Stop reason: SDUPTHER

## 2019-06-04 ENCOUNTER — TELEPHONE (OUTPATIENT)
Dept: FAMILY MEDICINE CLINIC | Facility: HOSPITAL | Age: 54
End: 2019-06-04

## 2019-06-26 DIAGNOSIS — K21.9 GASTROESOPHAGEAL REFLUX DISEASE, ESOPHAGITIS PRESENCE NOT SPECIFIED: Primary | ICD-10-CM

## 2019-06-26 RX ORDER — OMEPRAZOLE 40 MG/1
CAPSULE, DELAYED RELEASE ORAL
Qty: 180 CAPSULE | Refills: 1 | Status: SHIPPED | OUTPATIENT
Start: 2019-06-26 | End: 2020-01-13

## 2019-09-19 ENCOUNTER — OFFICE VISIT (OUTPATIENT)
Dept: FAMILY MEDICINE CLINIC | Facility: HOSPITAL | Age: 54
End: 2019-09-19
Payer: COMMERCIAL

## 2019-09-19 VITALS
WEIGHT: 197 LBS | BODY MASS INDEX: 32.82 KG/M2 | HEART RATE: 78 BPM | HEIGHT: 65 IN | SYSTOLIC BLOOD PRESSURE: 118 MMHG | RESPIRATION RATE: 16 BRPM | DIASTOLIC BLOOD PRESSURE: 78 MMHG | TEMPERATURE: 99.3 F

## 2019-09-19 DIAGNOSIS — J06.9 VIRAL UPPER RESPIRATORY INFECTION: Primary | ICD-10-CM

## 2019-09-19 PROCEDURE — 3008F BODY MASS INDEX DOCD: CPT | Performed by: INTERNAL MEDICINE

## 2019-09-19 PROCEDURE — 99213 OFFICE O/P EST LOW 20 MIN: CPT | Performed by: INTERNAL MEDICINE

## 2019-09-19 RX ORDER — PROMETHAZINE HYDROCHLORIDE AND CODEINE PHOSPHATE 6.25; 1 MG/5ML; MG/5ML
5 SYRUP ORAL 2 TIMES DAILY PRN
Qty: 120 ML | Refills: 0 | Status: SHIPPED | OUTPATIENT
Start: 2019-09-19 | End: 2020-01-16 | Stop reason: ALTCHOICE

## 2019-09-19 RX ORDER — FLUTICASONE PROPIONATE 50 MCG
1 SPRAY, SUSPENSION (ML) NASAL 2 TIMES DAILY
Qty: 1 BOTTLE | Refills: 1 | Status: SHIPPED | OUTPATIENT
Start: 2019-09-19 | End: 2019-10-11 | Stop reason: SDUPTHER

## 2019-09-19 NOTE — PROGRESS NOTES
BMI Counseling: Body mass index is 32 78 kg/m²  The BMI is above normal  Nutrition recommendations include reducing portion sizes

## 2019-09-19 NOTE — PROGRESS NOTES
Assessment/Plan:    No problem-specific Assessment & Plan notes found for this encounter  Diagnoses and all orders for this visit:    Viral upper respiratory infection  -     fluticasone (FLONASE) 50 mcg/act nasal spray; 1 spray into each nostril 2 (two) times a day  -     promethazine-codeine (PHENERGAN WITH CODEINE) 6 25-10 mg/5 mL syrup; Take 5 mL by mouth 2 (two) times a day as needed for cough          Subjective:      Patient ID: Nory Smith is a 48 y o  female  URI    This is a new problem  Episode onset: 4 days ago  The problem has been unchanged  There has been no fever  Associated symptoms include congestion, coughing, a plugged ear sensation, rhinorrhea and swollen glands  Pertinent negatives include no chest pain, diarrhea, nausea or wheezing  The following portions of the patient's history were reviewed and updated as appropriate: current medications, past family history, past medical history, past social history, past surgical history and problem list     Review of Systems   HENT: Positive for congestion, postnasal drip and rhinorrhea  Respiratory: Positive for cough  Negative for shortness of breath and wheezing  Cardiovascular: Negative for chest pain  Gastrointestinal: Negative for diarrhea and nausea  Objective:    /78   Pulse 78   Temp 99 3 °F (37 4 °C)   Resp 16   Ht 5' 5" (1 651 m)   Wt 89 4 kg (197 lb)   BMI 32 78 kg/m²      Physical Exam   Constitutional: She is oriented to person, place, and time  She appears well-developed  HENT:   Head: Normocephalic  Left TM is retracted, clear nasal discharge b/l   Neck: Neck supple  Cardiovascular: Normal rate and regular rhythm  Pulmonary/Chest: Effort normal and breath sounds normal  She has no wheezes  She has no rales  Lymphadenopathy:     She has cervical adenopathy (on left)  Neurological: She is alert and oriented to person, place, and time             Ted Garibay MD

## 2019-09-19 NOTE — LETTER
September 19, 2019     Patient: Tian Chance   YOB: 1965   Date of Visit: 9/19/2019       To Whom it May Concern:    Luly Fisher is under my professional care  She was seen in my office on 9/19/2019  She may return to work on 9/25/2019  If you have any questions or concerns, please don't hesitate to call  Sincerely,          Anai Lainez MD        CC: Kindra Campbell

## 2019-09-24 ENCOUNTER — OFFICE VISIT (OUTPATIENT)
Dept: FAMILY MEDICINE CLINIC | Facility: HOSPITAL | Age: 54
End: 2019-09-24
Payer: COMMERCIAL

## 2019-09-24 VITALS
HEART RATE: 64 BPM | BODY MASS INDEX: 33.12 KG/M2 | SYSTOLIC BLOOD PRESSURE: 104 MMHG | HEIGHT: 65 IN | WEIGHT: 198.8 LBS | RESPIRATION RATE: 14 BRPM | DIASTOLIC BLOOD PRESSURE: 68 MMHG | OXYGEN SATURATION: 95 %

## 2019-09-24 DIAGNOSIS — Z00.00 ANNUAL PHYSICAL EXAM: Primary | ICD-10-CM

## 2019-09-24 DIAGNOSIS — G43.009 MIGRAINE WITHOUT AURA AND WITHOUT STATUS MIGRAINOSUS, NOT INTRACTABLE: ICD-10-CM

## 2019-09-24 PROCEDURE — 99396 PREV VISIT EST AGE 40-64: CPT | Performed by: INTERNAL MEDICINE

## 2019-09-24 RX ORDER — ZOLMITRIPTAN 2.5 MG/1
2.5 TABLET, ORALLY DISINTEGRATING ORAL DAILY PRN
Qty: 18 TABLET | Refills: 3 | Status: SHIPPED | OUTPATIENT
Start: 2019-09-24 | End: 2020-11-24 | Stop reason: SDUPTHER

## 2019-09-24 NOTE — PATIENT INSTRUCTIONS

## 2019-09-24 NOTE — PROGRESS NOTES
[facility]    NAME: Lottie Webster  AGE: 48 y o  SEX: female  : 1965     DATE: 2019     Assessment and Plan:     Problem List Items Addressed This Visit        Cardiovascular and Mediastinum    Migraine without aura and without status migrainosus, not intractable    Relevant Medications    ZOLMitriptan (ZOMIG-ZMT) 2 5 MG disintegrating tablet      Other Visit Diagnoses     Annual physical exam    -  Primary          Immunizations and preventive care screenings were discussed with patient today  Appropriate education was printed on patient's after visit summary  Counseling:  · Exercise: the importance of regular exercise/physical activity was discussed  Recommend exercise 3-5 times per week for at least 30 minutes  No follow-ups on file  Chief Complaint:     Chief Complaint   Patient presents with    Annual Exam      History of Present Illness:     Adult Annual Physical   Patient here for a comprehensive physical exam  The patient reports problems - URI is improving  Diet and Physical Activity  · Diet/Nutrition: well balanced diet  · Exercise: no formal exercise  Depression Screening  PHQ-9 Depression Screening    PHQ-9:    Frequency of the following problems over the past two weeks:            General Health  · Sleep: sleeps well  · Hearing: decreased - bilateral   · Vision: wears glasses  · Dental: regular dental visits  /GYN Health  · Patient is: s/p hysterctomy       Review of Systems:     Review of Systems   Constitutional: Negative for fever  HENT: Positive for hearing loss  Negative for congestion  Eyes: Negative for visual disturbance  Respiratory: Positive for cough  Negative for shortness of breath  Cardiovascular: Negative for chest pain, palpitations and leg swelling  Gastrointestinal: Negative for abdominal pain, blood in stool and diarrhea  Endocrine: Negative for polydipsia and polyphagia  Genitourinary: Negative for difficulty urinating and dysuria  Musculoskeletal: Negative for joint swelling, myalgias and neck stiffness  Skin: Negative for rash  Neurological: Negative for weakness, numbness and headaches (stable on prn zomig)  Hematological: Negative for adenopathy  Psychiatric/Behavioral: Negative for dysphoric mood  All other systems reviewed and are negative       Past Medical History:     Past Medical History:   Diagnosis Date    Endometrial hyperplasia     Enhanced S-cone syndrome     Overweight     Retinitis pigmentosa       Past Surgical History:     Past Surgical History:   Procedure Laterality Date    ANAL/SPHINCTEROPLASTY      ANTERIOR AND POSTERIOR VAGINAL REPAIR      BLADDER SUSPENSION      DILATION AND CURETTAGE OF UTERUS      X 2    HYSTERECTOMY  2014    OOPHORECTOMY Bilateral 2014    with hysterectomy    TONSILLECTOMY AND ADENOIDECTOMY        Social History:     Social History     Socioeconomic History    Marital status: /Civil Union     Spouse name: None    Number of children: None    Years of education: None    Highest education level: None   Occupational History    Occupation: RN   Social Needs    Financial resource strain: None    Food insecurity:     Worry: None     Inability: None    Transportation needs:     Medical: No     Non-medical: No   Tobacco Use    Smoking status: Never Smoker    Smokeless tobacco: Never Used   Substance and Sexual Activity    Alcohol use: Yes     Comment: rare    Drug use: No    Sexual activity: None   Lifestyle    Physical activity:     Days per week: None     Minutes per session: None    Stress: None   Relationships    Social connections:     Talks on phone: None     Gets together: None     Attends Confucianist service: None     Active member of club or organization: None     Attends meetings of clubs or organizations: None     Relationship status: None  Intimate partner violence:     Fear of current or ex partner: None     Emotionally abused: None     Physically abused: None     Forced sexual activity: None   Other Topics Concern    None   Social History Narrative    Dental care, regularly    Lives with spouse    Living situation: Supportive and safe    No alcohol use - As per [employer]    Social drinker - As per [employer]       Family History:     Family History   Problem Relation Age of Onset    Cancer Mother         lung    COPD Father     Diabetes type II Brother         on insulin    Substance Abuse Sister    [facility] HIV Sister     No Known Problems Daughter     Other Family         Aortic valve replacement     Mental illness Neg Hx       Current Medications:     Current Outpatient Medications   Medication Sig Dispense Refill    aspirin (ECOTRIN LOW STRENGTH) 81 mg EC tablet Take 81 mg by mouth      estradiol (ESTRACE) 0 5 MG tablet Take 0 5 mg by mouth      fluticasone (FLONASE) 50 mcg/act nasal spray 1 spray into each nostril 2 (two) times a day 1 Bottle 1    ibuprofen (MOTRIN) 200 mg tablet Take 600 mg by mouth every 6 (six) hours as needed for mild pain      levothyroxine 88 mcg tablet Take 1 tablet (88 mcg total) by mouth daily 90 tablet 3    Lutein 20 MG CAPS Take by mouth daily      Multiple Vitamins-Minerals (MULTIVITAMIN GUMMIES WOMENS) CHEW Chew 2 each daily      omeprazole (PriLOSEC) 40 MG capsule TAKE 1 CAPSULE BY MOUTH TWICE A  capsule 1    promethazine-codeine (PHENERGAN WITH CODEINE) 6 25-10 mg/5 mL syrup Take 5 mL by mouth 2 (two) times a day as needed for cough 120 mL 0    verapamil (CALAN-SR) 120 mg CR tablet Take 120 mg by mouth      ZOLMitriptan (ZOMIG-ZMT) 2 5 MG disintegrating tablet Take 1 tablet (2 5 mg total) by mouth daily as needed for migraine 18 tablet 3     No current facility-administered medications for this visit  Allergies:      Allergies   Allergen Reactions    Latex Rash and Hives    Other Other reaction(s): hair dye causes rash    Lansoprazole Rash      Physical Exam:     /68   Pulse 64   Resp 14   Ht 5' 5" (1 651 m)   Wt 90 2 kg (198 lb 12 8 oz)   LMP  (LMP Unknown)   SpO2 95%   BMI 33 08 kg/m²     Physical Exam   Constitutional: She is oriented to person, place, and time  She appears well-developed  No distress  HENT:   Head: Normocephalic  Mouth/Throat: Oropharynx is clear and moist  No oropharyngeal exudate  B/l retracted TMs   Eyes: Conjunctivae are normal    Neck: Neck supple  Cardiovascular: Normal rate and regular rhythm  Pulmonary/Chest: Effort normal  No respiratory distress  She has no wheezes  She has no rales  Abdominal: Soft  Bowel sounds are normal  She exhibits no distension  There is no tenderness  Musculoskeletal: She exhibits no tenderness  Lymphadenopathy:     She has no cervical adenopathy  Neurological: She is alert and oriented to person, place, and time  No cranial nerve deficit  Skin: Skin is warm and dry  No rash noted  Psychiatric: She has a normal mood and affect  Vitals reviewed        Feroz Tapia MD  [id number] [facility]

## 2019-10-11 ENCOUNTER — TRANSCRIBE ORDERS (OUTPATIENT)
Dept: ADMINISTRATIVE | Facility: HOSPITAL | Age: 54
End: 2019-10-11

## 2019-10-11 DIAGNOSIS — N64.4 BREAST PAIN: Primary | ICD-10-CM

## 2019-10-11 DIAGNOSIS — N63.0 BREAST LUMP IN FEMALE: ICD-10-CM

## 2019-10-11 DIAGNOSIS — J06.9 VIRAL UPPER RESPIRATORY INFECTION: ICD-10-CM

## 2019-10-11 RX ORDER — FLUTICASONE PROPIONATE 50 MCG
SPRAY, SUSPENSION (ML) NASAL
Qty: 16 ML | Refills: 1 | Status: SHIPPED | OUTPATIENT
Start: 2019-10-11 | End: 2020-01-16 | Stop reason: ALTCHOICE

## 2019-10-14 ENCOUNTER — HOSPITAL ENCOUNTER (OUTPATIENT)
Dept: ULTRASOUND IMAGING | Facility: CLINIC | Age: 54
Discharge: HOME | End: 2019-10-14
Payer: COMMERCIAL

## 2019-10-14 VITALS — HEIGHT: 65 IN | BODY MASS INDEX: 32.99 KG/M2 | WEIGHT: 198 LBS

## 2019-10-14 DIAGNOSIS — N63.0 BREAST LUMP IN FEMALE: ICD-10-CM

## 2019-10-14 DIAGNOSIS — N64.4 BREAST PAIN: ICD-10-CM

## 2019-10-14 PROCEDURE — 76642 ULTRASOUND BREAST LIMITED: CPT

## 2020-01-12 DIAGNOSIS — K21.9 GASTROESOPHAGEAL REFLUX DISEASE, ESOPHAGITIS PRESENCE NOT SPECIFIED: ICD-10-CM

## 2020-01-13 RX ORDER — OMEPRAZOLE 40 MG/1
CAPSULE, DELAYED RELEASE ORAL
Qty: 180 CAPSULE | Refills: 0 | Status: SHIPPED | OUTPATIENT
Start: 2020-01-13 | End: 2020-04-29

## 2020-01-14 ENCOUNTER — TELEPHONE (OUTPATIENT)
Dept: ENDOCRINOLOGY | Facility: HOSPITAL | Age: 55
End: 2020-01-14

## 2020-01-14 NOTE — TELEPHONE ENCOUNTER
Patient left a message stating that she had some blood work done in September and wanted to know if she needed more before her appointment this month   I left her a message to find out where she had her labs done in September

## 2020-01-15 NOTE — TELEPHONE ENCOUNTER
Pt had labs done at [facility] in September  She will bring paper copy but I also requested results from [facility]   She wants to keep her appt for 1/16

## 2020-01-16 ENCOUNTER — OFFICE VISIT (OUTPATIENT)
Dept: ENDOCRINOLOGY | Facility: HOSPITAL | Age: 55
End: 2020-01-16
Payer: COMMERCIAL

## 2020-01-16 VITALS
SYSTOLIC BLOOD PRESSURE: 124 MMHG | HEART RATE: 78 BPM | HEIGHT: 65 IN | DIASTOLIC BLOOD PRESSURE: 80 MMHG | WEIGHT: 199 LBS | BODY MASS INDEX: 33.15 KG/M2

## 2020-01-16 DIAGNOSIS — E06.3 HYPOTHYROIDISM DUE TO HASHIMOTO'S THYROIDITIS: Primary | ICD-10-CM

## 2020-01-16 DIAGNOSIS — E04.9 GOITER: ICD-10-CM

## 2020-01-16 DIAGNOSIS — E03.9 HYPOTHYROIDISM, UNSPECIFIED TYPE: ICD-10-CM

## 2020-01-16 DIAGNOSIS — E03.8 HYPOTHYROIDISM DUE TO HASHIMOTO'S THYROIDITIS: Primary | ICD-10-CM

## 2020-01-16 PROCEDURE — 99213 OFFICE O/P EST LOW 20 MIN: CPT | Performed by: INTERNAL MEDICINE

## 2020-01-16 RX ORDER — LEVOTHYROXINE SODIUM 88 UG/1
88 TABLET ORAL DAILY
Qty: 90 TABLET | Refills: 3 | Status: SHIPPED | OUTPATIENT
Start: 2020-01-16 | End: 2020-12-04

## 2020-01-16 NOTE — PROGRESS NOTES
1/16/2020    Assessment/Plan      Diagnoses and all orders for this visit:    Hypothyroidism due to Hashimoto's thyroiditis  -     TSH, 3rd generation Lab Collect; Future  -     T4, free Lab Collect; Future  -     Anti-microsomal antibody Lab Collect; Future  -     Anti-thyroglobulin antibody; Future    Goiter  -     TSH, 3rd generation Lab Collect; Future  -     T4, free Lab Collect; Future  -     Anti-microsomal antibody Lab Collect; Future  -     Anti-thyroglobulin antibody; Future    Hypothyroidism, unspecified type  -     levothyroxine 88 mcg tablet; Take 1 tablet (88 mcg total) by mouth daily        Assessment/Plan:  1  Hypothyroidism due to Hashimoto's thyroiditis  Most recent thyroid function tests do show a normal TSH  She is biochemically and clinically euthyroid  She will continue the same levothyroxine 88 mcg daily  2  Goiter  She has a history of a small mild goiter on last ultrasound  No repeat ultrasounds need to be done at this point  She has no compressive thyroid symptoms  I have asked her to follow up in 1 year with preceding TSH and free T4  I will also order thyroid antibodies  CC:  Hypothyroid and goiter follow-up    History of Present Illness     HPI: Jan Holman is a 47y o  year old female with history of hypothyroidism due to Hashimoto's thyroiditis with goiter here for follow-up  She was diagnosed with Hashimoto's thyroiditis in 2003  Thyroid hormone was started when TSH was over 7 and has been adjusted over the years  She is currently taking levothyroxine 88 mcg daily  She has chronic constipation  She has occasional palpitations  She is fatigued  Weight is 4 lb more than last year  She can weight 2-3 times a night  She denies heat or cold intolerance, tremors, anxiety or depression, diarrhea, or brittle nails  She has some dry skin and unchanged hair loss  She continues to have diplopia due to a conversion disorder in her vision    She does have a history of goiter with no compressive thyroid symptoms in the past   Last thyroid ultrasound in 2017 showed only a mild goiter  She denies compressive thyroid symptoms or difficulties with swallowing  Review of Systems   Constitutional: Positive for fatigue  Negative for unexpected weight change  Weight 4 lb more than last visit  HENT: Negative for trouble swallowing  Eyes: Positive for visual disturbance  Has dry eyes  Has diplopia with convergence disorder  Wears glasses  Respiratory: Negative for chest tightness and shortness of breath  Cardiovascular: Positive for palpitations  Negative for chest pain  Occasional palpitations  Gastrointestinal: Positive for constipation  Negative for abdominal pain, diarrhea and nausea  Endocrine: Negative for cold intolerance and heat intolerance  Skin: Negative for rash  Has dry skin  No brittle nails  Has hair loss unchanged  Neurological: Negative for dizziness, tremors, light-headedness and headaches  Psychiatric/Behavioral: Negative for dysphoric mood and sleep disturbance  The patient is not nervous/anxious  Can wake 2-3 times a night         Historical Information   Past Medical History:   Diagnosis Date    Convergence insufficiency     Endometrial hyperplasia     Enhanced S-cone syndrome     Overweight     Retinitis pigmentosa      Past Surgical History:   Procedure Laterality Date    ANAL/SPHINCTEROPLASTY      ANTERIOR AND POSTERIOR VAGINAL REPAIR      BLADDER SUSPENSION      DILATION AND CURETTAGE OF UTERUS      X 2    HYSTERECTOMY  2014    OOPHORECTOMY Bilateral 2014    with hysterectomy    TONSILLECTOMY AND ADENOIDECTOMY       Social History   Social History     Substance and Sexual Activity   Alcohol Use Yes    Frequency: Monthly or less    Comment: rare     Social History     Substance and Sexual Activity   Drug Use No     Social History     Tobacco Use   Smoking Status Never Smoker   Smokeless Tobacco Never Used     Family History:   Family History   Problem Relation Age of Onset    Cancer Mother         lung    COPD Father     Diabetes type II Brother         on insulin    Substance Abuse Sister     HIV Sister     No Known Problems Daughter     Other Family         Aortic valve replacement     No Known Problems Maternal Grandmother     Mental illness Neg Hx        Meds/Allergies   Current Outpatient Medications   Medication Sig Dispense Refill    estradiol (ESTRACE) 0 5 MG tablet Take 0 5 mg by mouth      ibuprofen (MOTRIN) 200 mg tablet Take 600 mg by mouth every 6 (six) hours as needed for mild pain      levothyroxine 88 mcg tablet Take 1 tablet (88 mcg total) by mouth daily 90 tablet 3    Lutein 20 MG CAPS Take by mouth daily      Multiple Vitamins-Minerals (MULTIVITAMIN GUMMIES WOMENS) CHEW Chew 2 each daily      omeprazole (PriLOSEC) 40 MG capsule TAKE 1 CAPSULE BY MOUTH TWICE A DAY (Patient taking differently: Take 40 mg by mouth 2 (two) times a day ) 180 capsule 0    verapamil (CALAN-SR) 120 mg CR tablet Take 120 mg by mouth      ZOLMitriptan (ZOMIG-ZMT) 2 5 MG disintegrating tablet Take 1 tablet (2 5 mg total) by mouth daily as needed for migraine 18 tablet 3     No current facility-administered medications for this visit  Allergies   Allergen Reactions    Latex Rash and Hives    Other      Other reaction(s): hair dye causes rash    Lansoprazole Rash       Objective   Vitals: Blood pressure 124/80, pulse 78, height 5' 5" (1 651 m), weight 90 3 kg (199 lb)  Invasive Devices     None                 Physical Exam   Constitutional: She is oriented to person, place, and time  She appears well-developed and well-nourished  HENT:   Head: Normocephalic and atraumatic  Eyes: Pupils are equal, round, and reactive to light  Conjunctivae and EOM are normal    No lid lag, stare, proptosis, or periorbital edema  Neck: Normal range of motion  Neck supple  No thyromegaly present  Thyroid normal in size  No palpable thyroid nodules  No carotid bruits  Cardiovascular: Normal rate, regular rhythm and normal heart sounds  No murmur heard  Pulmonary/Chest: Effort normal and breath sounds normal  She has no wheezes  Musculoskeletal: Normal range of motion  She exhibits no edema or deformity  No tremor of the outstretched hands  Lymphadenopathy:     She has no cervical adenopathy  Neurological: She is alert and oriented to person, place, and time  She has normal reflexes  Deep tendon reflexes normal    Skin: Skin is warm and dry  No rash noted  Vitals reviewed  The history was obtained from the review of the chart and from the patient  Lab Results:    Blood work done at [facility] on 9/6/18 demonstrates a TSH of 2 1      Future Appointments   Date Time Provider Cj Hernandez   1/18/2021 11:20 AM Deitra Mortimer, [address] [facility]

## 2020-01-16 NOTE — PATIENT INSTRUCTIONS
Most recent thyroid blood work is normal   Continue the same levothyroxine 88 mcg daily  Follow up in 1 year with blood work

## 2020-03-25 ENCOUNTER — OFFICE VISIT (OUTPATIENT)
Dept: FAMILY MEDICINE CLINIC | Facility: HOSPITAL | Age: 55
End: 2020-03-25
Payer: COMMERCIAL

## 2020-03-25 VITALS
BODY MASS INDEX: 33.69 KG/M2 | OXYGEN SATURATION: 94 % | TEMPERATURE: 97.8 F | DIASTOLIC BLOOD PRESSURE: 24 MMHG | WEIGHT: 202.2 LBS | HEIGHT: 65 IN | HEART RATE: 83 BPM | SYSTOLIC BLOOD PRESSURE: 122 MMHG

## 2020-03-25 DIAGNOSIS — R22.1 MASS OF LEFT SIDE OF NECK: Primary | ICD-10-CM

## 2020-03-25 DIAGNOSIS — J02.9 PHARYNGITIS, UNSPECIFIED ETIOLOGY: ICD-10-CM

## 2020-03-25 DIAGNOSIS — N76.0 ACUTE VAGINITIS: ICD-10-CM

## 2020-03-25 DIAGNOSIS — R53.82 CHRONIC FATIGUE: ICD-10-CM

## 2020-03-25 PROBLEM — R07.9 CHEST PAIN: Status: ACTIVE | Noted: 2019-12-09

## 2020-03-25 PROBLEM — H35.51: Status: ACTIVE | Noted: 2019-05-30

## 2020-03-25 PROBLEM — Z86.0100 HISTORY OF COLONIC POLYPS: Status: ACTIVE | Noted: 2020-03-25

## 2020-03-25 PROBLEM — Q99.8: Status: ACTIVE | Noted: 2019-05-30

## 2020-03-25 PROBLEM — Z86.010 HISTORY OF COLONIC POLYPS: Status: ACTIVE | Noted: 2020-03-25

## 2020-03-25 PROBLEM — H53.453 PERIPHERAL VISUAL FIELD DEFECT OF BOTH EYES: Status: ACTIVE | Noted: 2020-03-25

## 2020-03-25 PROCEDURE — 3008F BODY MASS INDEX DOCD: CPT | Performed by: INTERNAL MEDICINE

## 2020-03-25 PROCEDURE — 1036F TOBACCO NON-USER: CPT | Performed by: INTERNAL MEDICINE

## 2020-03-25 PROCEDURE — 99214 OFFICE O/P EST MOD 30 MIN: CPT | Performed by: INTERNAL MEDICINE

## 2020-03-25 RX ORDER — FLUCONAZOLE 150 MG/1
150 TABLET ORAL DAILY
Qty: 4 TABLET | Refills: 0 | Status: SHIPPED | OUTPATIENT
Start: 2020-03-25 | End: 2020-03-29

## 2020-03-25 RX ORDER — METAXALONE 800 MG/1
800 TABLET ORAL 4 TIMES DAILY
COMMUNITY
Start: 2020-03-20 | End: 2020-06-25 | Stop reason: ALTCHOICE

## 2020-03-25 RX ORDER — GABAPENTIN 100 MG/1
100 CAPSULE ORAL 3 TIMES DAILY
COMMUNITY
End: 2020-06-25 | Stop reason: ALTCHOICE

## 2020-03-25 RX ORDER — CEFUROXIME AXETIL 500 MG/1
500 TABLET ORAL EVERY 12 HOURS SCHEDULED
Qty: 20 TABLET | Refills: 0 | Status: SHIPPED | OUTPATIENT
Start: 2020-03-25 | End: 2020-04-04

## 2020-03-25 NOTE — ASSESSMENT & PLAN NOTE
Loss of peripheral vision and has retinitis pigmentosis- sees Dr Sin Sage- low vision specialist at [facility] [place]

## 2020-03-25 NOTE — PROGRESS NOTES
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  2 years ago had seen ent referral- had scope ( has been on meds for reflux for over 20 years)  Feels that pills are getting stuck at times  2  Fatigue- feels she could take a nap daily  3  Chest pain- seen at cardiology for annual visit- has script for stress testing  The following portions of the patient's history were reviewed and updated as appropriate: allergies, current medications and problem list      Review of Systems   Constitutional: Negative for chills  HENT: Positive for postnasal drip, sore throat and trouble swallowing  Negative for congestion, sinus pressure and sinus pain  Respiratory:        Burnng in throat when climbing steps since December- seen by cardiology   Musculoskeletal: Positive for back pain  Sees [place] back team- doing PT- has appt with employee health o next month to follow up with that  Neurological: Positive for headaches  Had headache about 2-3 weeks ago- not severe now  All other systems reviewed and are negative          Objective:      Current Outpatient Medications:     ergocalciferol (VITAMIN D2) 50,000 units, Take 50,000 Units by mouth, Disp: , Rfl:     estradiol (ESTRACE) 0 5 MG tablet, Take 0 5 mg by mouth, Disp: , Rfl:     gabapentin (NEURONTIN) 100 mg capsule, Take 100 mg by mouth 3 (three) times a day, Disp: , Rfl:     ibuprofen (MOTRIN) 200 mg tablet, Take 600 mg by mouth every 6 (six) hours as needed for mild pain, Disp: , Rfl:     levothyroxine 88 mcg tablet, Take 1 tablet (88 mcg total) by mouth daily, Disp: 90 tablet, Rfl: 3    metaxalone (SKELAXIN) 800 mg tablet, Take 800 mg by mouth 4 (four) times a day, Disp: , Rfl:     Multiple Vitamins-Minerals (MULTIVITAMIN GUMMIES WOMENS) CHEW, Chew 2 each daily, Disp: , Rfl:     omeprazole (PriLOSEC) 40 MG capsule, TAKE 1 CAPSULE BY MOUTH TWICE A DAY (Patient taking differently: Take 40 mg by mouth 2 (two) times a day ), Disp: 180 capsule, Rfl: 0    verapamil (CALAN-SR) 120 mg CR tablet, Take 120 mg by mouth, Disp: , Rfl:     ZOLMitriptan (ZOMIG-ZMT) 2 5 MG disintegrating tablet, Take 1 tablet (2 5 mg total) by mouth daily as needed for migraine, Disp: 18 tablet, Rfl: 3    Lutein 20 MG CAPS, Take by mouth daily, Disp: , Rfl:     Blood pressure (!) 122/24, pulse 83, temperature 97 8 °F (36 6 °C), temperature source Tympanic, height 5' 5" (1 651 m), weight 91 7 kg (202 lb 3 2 oz), SpO2 94 %  Physical Exam   Constitutional: She is oriented to person, place, and time  She appears well-developed and well-nourished  No distress  HENT:   Head: Normocephalic  Right Ear: External ear normal    Left Ear: External ear normal    Mouth/Throat: No oropharyngeal exudate  Left posterior tonsillar remnant with mild redness  No exudate   Neck: No thyromegaly present  Left firm  Nodule in scalene region   Cardiovascular: Normal rate and regular rhythm  Exam reveals no friction rub  No murmur heard  Pulmonary/Chest: Effort normal and breath sounds normal  She has no wheezes  She has no rales  Abdominal: Soft  Bowel sounds are normal  She exhibits no distension  Musculoskeletal: She exhibits no edema  Lymphadenopathy:     She has cervical adenopathy  Neurological: She is alert and oriented to person, place, and time  Skin: No rash noted  No erythema  Nursing note and vitals reviewed

## 2020-03-26 ENCOUNTER — HOSPITAL ENCOUNTER (OUTPATIENT)
Dept: CT IMAGING | Facility: HOSPITAL | Age: 55
Discharge: HOME | End: 2020-03-26
Attending: INTERNAL MEDICINE
Payer: COMMERCIAL

## 2020-03-26 DIAGNOSIS — J02.9 PHARYNGITIS, UNSPECIFIED ETIOLOGY: ICD-10-CM

## 2020-03-26 DIAGNOSIS — R22.1 MASS OF LEFT SIDE OF NECK: ICD-10-CM

## 2020-03-26 DIAGNOSIS — J02.9 ACUTE PHARYNGITIS, UNSPECIFIED ETIOLOGY: Primary | ICD-10-CM

## 2020-03-26 DIAGNOSIS — R59.0 CERVICAL LYMPHADENOPATHY: ICD-10-CM

## 2020-03-26 PROCEDURE — 70490 CT SOFT TISSUE NECK W/O DYE: CPT

## 2020-03-30 LAB — B-HEM STREP SPEC QL CULT: NEGATIVE

## 2020-04-06 ENCOUNTER — TELEPHONE (OUTPATIENT)
Dept: RADIOLOGY | Facility: HOSPITAL | Age: 55
End: 2020-04-06

## 2020-04-21 ENCOUNTER — HOSPITAL ENCOUNTER (OUTPATIENT)
Dept: ULTRASOUND IMAGING | Facility: HOSPITAL | Age: 55
Discharge: HOME | End: 2020-04-21
Attending: OTOLARYNGOLOGY | Admitting: RADIOLOGY
Payer: COMMERCIAL

## 2020-04-21 DIAGNOSIS — R59.1 LYMPHADENOPATHY OF HEAD AND NECK: ICD-10-CM

## 2020-04-21 PROCEDURE — 10005 FNA BX W/US GDN 1ST LES: CPT

## 2020-04-21 PROCEDURE — 88185 FLOWCYTOMETRY/TC ADD-ON: CPT

## 2020-04-21 PROCEDURE — 88184 FLOWCYTOMETRY/ TC 1 MARKER: CPT | Performed by: OTOLARYNGOLOGY

## 2020-04-21 PROCEDURE — 88173 CYTOPATH EVAL FNA REPORT: CPT | Performed by: PATHOLOGY

## 2020-04-21 PROCEDURE — 88172 CYTP DX EVAL FNA 1ST EA SITE: CPT | Performed by: PATHOLOGY

## 2020-04-21 RX ORDER — LIDOCAINE HYDROCHLORIDE 10 MG/ML
5 INJECTION, SOLUTION EPIDURAL; INFILTRATION; INTRACAUDAL; PERINEURAL ONCE
Status: COMPLETED | OUTPATIENT
Start: 2020-04-21 | End: 2020-04-21

## 2020-04-21 RX ADMIN — LIDOCAINE HYDROCHLORIDE 2.5 ML: 10 INJECTION, SOLUTION EPIDURAL; INFILTRATION; INTRACAUDAL; PERINEURAL at 14:23

## 2020-04-26 DIAGNOSIS — K21.9 GASTROESOPHAGEAL REFLUX DISEASE, ESOPHAGITIS PRESENCE NOT SPECIFIED: ICD-10-CM

## 2020-04-27 LAB — SCAN RESULT: NORMAL

## 2020-04-29 ENCOUNTER — TELEPHONE (OUTPATIENT)
Dept: GASTROENTEROLOGY | Facility: CLINIC | Age: 55
End: 2020-04-29

## 2020-04-29 RX ORDER — OMEPRAZOLE 40 MG/1
CAPSULE, DELAYED RELEASE ORAL
Qty: 180 CAPSULE | Refills: 0 | Status: SHIPPED | OUTPATIENT
Start: 2020-04-29 | End: 2020-07-27

## 2020-06-25 ENCOUNTER — OFFICE VISIT (OUTPATIENT)
Dept: FAMILY MEDICINE CLINIC | Facility: HOSPITAL | Age: 55
End: 2020-06-25
Payer: COMMERCIAL

## 2020-06-25 VITALS
HEIGHT: 65 IN | BODY MASS INDEX: 33.59 KG/M2 | TEMPERATURE: 98.3 F | HEART RATE: 68 BPM | SYSTOLIC BLOOD PRESSURE: 124 MMHG | WEIGHT: 201.6 LBS | OXYGEN SATURATION: 98 % | DIASTOLIC BLOOD PRESSURE: 78 MMHG

## 2020-06-25 DIAGNOSIS — E06.3 HYPOTHYROIDISM DUE TO HASHIMOTO'S THYROIDITIS: ICD-10-CM

## 2020-06-25 DIAGNOSIS — Z12.39 SCREENING FOR BREAST CANCER: ICD-10-CM

## 2020-06-25 DIAGNOSIS — S04.72XD: ICD-10-CM

## 2020-06-25 DIAGNOSIS — C82.31 GRADE 3A FOLLICULAR LYMPHOMA OF LYMPH NODES OF NECK (HCC): Primary | ICD-10-CM

## 2020-06-25 DIAGNOSIS — E03.8 HYPOTHYROIDISM DUE TO HASHIMOTO'S THYROIDITIS: ICD-10-CM

## 2020-06-25 DIAGNOSIS — H65.05 RECURRENT ACUTE SEROUS OTITIS MEDIA OF LEFT EAR: ICD-10-CM

## 2020-06-25 PROBLEM — E03.9 HYPOTHYROIDISM: Status: ACTIVE | Noted: 2020-05-01

## 2020-06-25 PROBLEM — G43.909 MIGRAINE WITHOUT STATUS MIGRAINOSUS, NOT INTRACTABLE: Status: ACTIVE | Noted: 2020-05-01

## 2020-06-25 PROCEDURE — 99214 OFFICE O/P EST MOD 30 MIN: CPT | Performed by: INTERNAL MEDICINE

## 2020-06-25 PROCEDURE — 3008F BODY MASS INDEX DOCD: CPT | Performed by: INTERNAL MEDICINE

## 2020-06-25 PROCEDURE — 1036F TOBACCO NON-USER: CPT | Performed by: INTERNAL MEDICINE

## 2020-06-25 RX ORDER — CEFUROXIME AXETIL 250 MG/1
250 TABLET ORAL EVERY 12 HOURS SCHEDULED
Qty: 14 TABLET | Refills: 0 | Status: SHIPPED | OUTPATIENT
Start: 2020-06-25 | End: 2020-07-02

## 2020-06-27 LAB
ALBUMIN SERPL-MCNC: 4.2 G/DL (ref 3.8–4.9)
ALBUMIN/GLOB SERPL: 2.1 {RATIO} (ref 1.2–2.2)
ALP SERPL-CCNC: 77 IU/L (ref 39–117)
ALT SERPL-CCNC: 33 IU/L (ref 0–32)
AST SERPL-CCNC: 27 IU/L (ref 0–40)
BASOPHILS # BLD AUTO: 0 X10E3/UL (ref 0–0.2)
BASOPHILS NFR BLD AUTO: 0 %
BILIRUB SERPL-MCNC: 0.4 MG/DL (ref 0–1.2)
BUN SERPL-MCNC: 17 MG/DL (ref 6–24)
BUN/CREAT SERPL: 21 (ref 9–23)
CALCIUM SERPL-MCNC: 9.4 MG/DL (ref 8.7–10.2)
CHLORIDE SERPL-SCNC: 104 MMOL/L (ref 96–106)
CO2 SERPL-SCNC: 26 MMOL/L (ref 20–29)
CREAT SERPL-MCNC: 0.8 MG/DL (ref 0.57–1)
EOSINOPHIL # BLD AUTO: 0.1 X10E3/UL (ref 0–0.4)
EOSINOPHIL NFR BLD AUTO: 2 %
ERYTHROCYTE [DISTWIDTH] IN BLOOD BY AUTOMATED COUNT: 12.4 % (ref 11.7–15.4)
GLOBULIN SER-MCNC: 2 G/DL (ref 1.5–4.5)
GLUCOSE SERPL-MCNC: 100 MG/DL (ref 65–99)
HCT VFR BLD AUTO: 40.1 % (ref 34–46.6)
HGB BLD-MCNC: 13.6 G/DL (ref 11.1–15.9)
IMM GRANULOCYTES # BLD: 0 X10E3/UL (ref 0–0.1)
IMM GRANULOCYTES NFR BLD: 0 %
LYMPHOCYTES # BLD AUTO: 1.7 X10E3/UL (ref 0.7–3.1)
LYMPHOCYTES NFR BLD AUTO: 35 %
MCH RBC QN AUTO: 28.7 PG (ref 26.6–33)
MCHC RBC AUTO-ENTMCNC: 33.9 G/DL (ref 31.5–35.7)
MCV RBC AUTO: 85 FL (ref 79–97)
MONOCYTES # BLD AUTO: 0.4 X10E3/UL (ref 0.1–0.9)
MONOCYTES NFR BLD AUTO: 8 %
NEUTROPHILS # BLD AUTO: 2.5 X10E3/UL (ref 1.4–7)
NEUTROPHILS NFR BLD AUTO: 55 %
PLATELET # BLD AUTO: 269 X10E3/UL (ref 150–450)
POTASSIUM SERPL-SCNC: 4.1 MMOL/L (ref 3.5–5.2)
PROT SERPL-MCNC: 6.2 G/DL (ref 6–8.5)
RBC # BLD AUTO: 4.74 X10E6/UL (ref 3.77–5.28)
SL AMB EGFR AFRICAN AMERICAN: 97 ML/MIN/1.73
SL AMB EGFR NON AFRICAN AMERICAN: 84 ML/MIN/1.73
SODIUM SERPL-SCNC: 141 MMOL/L (ref 134–144)
TSH SERPL DL<=0.005 MIU/L-ACNC: 1.41 UIU/ML (ref 0.45–4.5)
WBC # BLD AUTO: 4.7 X10E3/UL (ref 3.4–10.8)

## 2020-07-23 DIAGNOSIS — K21.9 GASTROESOPHAGEAL REFLUX DISEASE, ESOPHAGITIS PRESENCE NOT SPECIFIED: ICD-10-CM

## 2020-07-27 RX ORDER — OMEPRAZOLE 40 MG/1
CAPSULE, DELAYED RELEASE ORAL
Qty: 180 CAPSULE | Refills: 0 | Status: SHIPPED | OUTPATIENT
Start: 2020-07-27 | End: 2021-07-30

## 2020-07-27 NOTE — TELEPHONE ENCOUNTER
Refill given to cover 90 days  Please call patient to schedule appt    She was due for appt 6 months after 11/2/2018

## 2020-07-29 ENCOUNTER — TELEMEDICINE (OUTPATIENT)
Dept: GASTROENTEROLOGY | Facility: CLINIC | Age: 55
End: 2020-07-29
Payer: COMMERCIAL

## 2020-07-29 VITALS — WEIGHT: 200 LBS | HEIGHT: 65 IN | BODY MASS INDEX: 33.32 KG/M2

## 2020-07-29 DIAGNOSIS — C85.90 NON-HODGKIN'S LYMPHOMA, UNSPECIFIED BODY REGION, UNSPECIFIED NON-HODGKIN LYMPHOMA TYPE (HCC): ICD-10-CM

## 2020-07-29 DIAGNOSIS — K21.9 GERD WITHOUT ESOPHAGITIS: Primary | ICD-10-CM

## 2020-07-29 DIAGNOSIS — Z86.010 HISTORY OF COLON POLYPS: ICD-10-CM

## 2020-07-29 PROCEDURE — 99213 OFFICE O/P EST LOW 20 MIN: CPT | Performed by: NURSE PRACTITIONER

## 2020-07-29 PROCEDURE — 1036F TOBACCO NON-USER: CPT | Performed by: NURSE PRACTITIONER

## 2020-07-29 PROCEDURE — 3008F BODY MASS INDEX DOCD: CPT | Performed by: NURSE PRACTITIONER

## 2020-07-29 RX ORDER — FAMOTIDINE 40 MG/1
40 TABLET, FILM COATED ORAL
Qty: 30 TABLET | Refills: 2 | Status: SHIPPED | OUTPATIENT
Start: 2020-07-29 | End: 2022-01-18

## 2020-07-29 NOTE — PROGRESS NOTES
Virtual Regular Visit      Assessment/Plan:  1  Gastroesophageal reflux disease   Long standing history of GERD  Still experiencing daily symptoms of reflux which is worse in the evening  She did try to wean off of BID PPI and is currently taking PPI daily  She had tried H2 blocker on its own in the past and this did not improve her symptoms  Differential diagnoses include gastritis, esophagitis  Last EGD was performed in 2016      - continue Omeprazole 40 mg daily, start taking 30 minutes-1 hour before breakfast   - trial Pepcid 40 mg at    - GERD lifestyle modifications discussed with pt   - will arrange for EGD at [facility]     2  History of colon polyps   Last colonoscopy in June 2016  5 year recall advised, June 2021  3  Non-Hodgkin's lymphoma   Recently diagnosed with follicular Non-Hodgkin's lymphoma  She was treated with radiation and finished her last treatment on 6/17  Follows with Mary Dias      - continue to follow with Mary Dias     Follow up: 6-8 weeks   Problem List Items Addressed This Visit        Digestive    GERD without esophagitis - Primary      Other Visit Diagnoses     History of colon polyps        Non-Hodgkin's lymphoma, unspecified body region, unspecified non-Hodgkin lymphoma type [facility])                 Reason for visit is GERD   Chief Complaint   Patient presents with    medication follow up    Virtual Regular Visit        Encounter provider Yury Donald, [facility]    Provider located at [address] [address] [phone]      Recent Visits  No visits were found meeting these conditions     Showing recent visits within past 7 days and meeting all other requirements     Today's Visits  Date Type Provider Dept   07/29/20 Telemedicine [facility]   Showing today's visits and meeting all other requirements     Future Appointments  No visits were found meeting these conditions  Showing future appointments within next 150 days and meeting all other requirements        The patient was identified by name and date of birth  Gideon Sánchez was informed that this is a telemedicine visit and that the visit is being conducted through Cher and patient was informed that this is not a secure, HIPAA-complaint platform  She agrees to proceed     My office door was closed  No one else was in the room  She acknowledged consent and understanding of privacy and security of the video platform  The patient has agreed to participate and understands they can discontinue the visit at any time  Patient is aware this is a billable service  Subjective  Gideon Sánchez is a 47 y o  female who was seen for a virtual visit for GERD  She was last seen in our office in November 2018  She has a longstanding history of GERD and is still experiencing daily symptoms of reflux  Her symptoms are much worse in the evening and she will often wake up in the middle of the night with reflux symptoms  Denies any fever, chills, dysphagia, odynophagia, appetite changes, weight loss, lower abdominal pain, change in bowel habits, hematochezia or melena  She does endorse a slight sore throat but this has been present since diagnosis of Non-Hodgkin's lymphoma         Past Medical History:   Diagnosis Date    Convergence insufficiency     Endometrial hyperplasia     Enhanced S-cone syndrome     Follicular lymphoma ([facility] [address] ) 04/2020    Overweight     Retinitis pigmentosa        Past Surgical History:   Procedure Laterality Date    ANAL/SPHINCTEROPLASTY      ANTERIOR AND POSTERIOR VAGINAL REPAIR      BLADDER SUSPENSION      DILATION AND CURETTAGE OF UTERUS      X 2    HYSTERECTOMY  2014    OOPHORECTOMY Bilateral 2014    with hysterectomy    TONSILLECTOMY AND ADENOIDECTOMY      US GUIDED LYMPH NODE BIOPSY LEFT  4/21/2020       Current Outpatient Medications   Medication Sig Dispense Refill    estradiol (ESTRACE) 0 5 MG tablet Take 0 5 mg by mouth daily       ibuprofen (MOTRIN) 200 mg tablet Take 600 mg by mouth every 6 (six) hours as needed for mild pain      levothyroxine 88 mcg tablet Take 1 tablet (88 mcg total) by mouth daily 90 tablet 3    Lutein 20 MG CAPS Take by mouth daily      Multiple Vitamins-Minerals (MULTIVITAMIN GUMMIES WOMENS) CHEW Chew 2 each daily      omeprazole (PriLOSEC) 40 MG capsule TAKE 1 CAPSULE BY MOUTH TWICE A DAY (Patient taking differently: Take 40 mg by mouth daily ) 180 capsule 0    verapamil (CALAN-SR) 120 mg CR tablet Take 120 mg by mouth      VITAMIN D, ERGOCALCIFEROL, PO Take 5,000 Units by mouth       ZOLMitriptan (ZOMIG-ZMT) 2 5 MG disintegrating tablet Take 1 tablet (2 5 mg total) by mouth daily as needed for migraine 18 tablet 3     No current facility-administered medications for this visit           Allergies   Allergen Reactions    Latex Rash and Hives    Other      Other reaction(s): hair dye causes rash  Orange coating    Lansoprazole Rash     Family History   Problem Relation Age of Onset    Cancer Mother         lung    COPD Father     Diabetes type II Brother         on insulin    Substance Abuse Sister     HIV Sister     No Known Problems Daughter     Other Family         Aortic valve replacement     No Known Problems Maternal Grandmother     Mental illness Neg Hx     Colon cancer Neg Hx     Colon polyps Neg Hx      Social History     Socioeconomic History    Marital status: /Civil Union     Spouse name: None    Number of children: None    Years of education: None    Highest education level: None   Occupational History    Occupation: RN   Social Needs    Financial resource strain: None    Food insecurity:     Worry: None     Inability: None    Transportation needs:     Medical: No     Non-medical: No   Tobacco Use    Smoking status: Never Smoker    Smokeless tobacco: Never Used   Substance and Sexual Activity  Alcohol use: Yes     Frequency: Monthly or less     Comment: rare    Drug use: No    Sexual activity: None   Lifestyle    Physical activity:     Days per week: None     Minutes per session: None    Stress: None   Relationships    Social connections:     Talks on phone: None     Gets together: None     Attends Mandaen service: None     Active member of club or organization: None     Attends meetings of clubs or organizations: None     Relationship status: None    Intimate partner violence:     Fear of current or ex partner: None     Emotionally abused: None     Physically abused: None     Forced sexual activity: None   Other Topics Concern    None   Social History Narrative    Dental care, regularly    Lives with spouse    Living situation: Supportive and safe    No alcohol use - As per [employer]    Social drinker - As per [employer]          Review of Systems  REVIEW OF SYSTEMS:    CONSTITUTIONAL: Denies any fever, chills, rigors, and weight loss  HEENT: No earache or tinnitus  Denies hearing loss or visual disturbances  CARDIOVASCULAR: No chest pain or palpitations  RESPIRATORY: Denies any cough, hemoptysis, shortness of breath or dyspnea on exertion  GASTROINTESTINAL: As noted in the History of Present Illness  GENITOURINARY: No problems with urination  Denies any hematuria or dysuria  NEUROLOGIC: No dizziness or vertigo, denies headaches  MUSCULOSKELETAL: Denies any muscle or joint pain  SKIN: Denies skin rashes or itching  ENDOCRINE: Denies excessive thirst  Denies intolerance to heat or cold  PSYCHOSOCIAL: Denies depression or anxiety  Denies any recent memory loss       Video Exam    Vitals:    07/29/20 1030   Weight: 90 7 kg (200 lb)   Height: 5' 5" (1 651 m)       Physical Exam   General: appears well, no acute distress   HENT: Normocephalic, atraumatic, anicteric   Neck: supple neck, symmetrical   Lungs: Equal chest rise and unlabored breathing   Neuro: AAOx3, follow commands Psych: cooperative, normal affect   Skin: No jaundice, rashes, or lesions         I spent 30 minutes directly with the patient during this visit      VIRTUAL VISIT DISCLAIMER    Lynnette Steven acknowledges that she has consented to an online visit or consultation  She understands that the online visit is based solely on information provided by her, and that, in the absence of a face-to-face physical evaluation by the physician, the diagnosis she receives is both limited and provisional in terms of accuracy and completeness  This is not intended to replace a full medical face-to-face evaluation by the physician  Lynnette Steven understands and accepts these terms

## 2020-07-29 NOTE — PATIENT INSTRUCTIONS
Continue Omeprazole daily but start taking in the morning 30 minutes-1 hour before breakfast   Trial pepcid 40 mg at bedtime   Arrange for Endoscopy at [facility]     Follow up in 6-8 weeks

## 2020-07-30 ENCOUNTER — TELEPHONE (OUTPATIENT)
Dept: GASTROENTEROLOGY | Facility: CLINIC | Age: 55
End: 2020-07-30

## 2020-07-30 NOTE — TELEPHONE ENCOUNTER
1st call-spoke w/pt-needs to sched egd from ov 7/29/20 w/zoraida at ENDO  Pt will call after she finds out if her medical exemption from work is being exntended

## 2020-07-30 NOTE — TELEPHONE ENCOUNTER
Pt was ordered 6-8 wk f/u from virtual per KW  Will wait until procedure is sched in order to sched f/u AK

## 2020-08-04 DIAGNOSIS — K21.9 GERD WITHOUT ESOPHAGITIS: Primary | ICD-10-CM

## 2020-08-04 DIAGNOSIS — K21.9 GERD WITHOUT ESOPHAGITIS: ICD-10-CM

## 2020-08-04 RX ORDER — NIZATIDINE 300 MG
CAPSULE ORAL
Refills: 0 | OUTPATIENT
Start: 2020-08-04

## 2020-08-04 RX ORDER — FAMOTIDINE 20 MG/1
20 TABLET, FILM COATED ORAL 2 TIMES DAILY
Qty: 60 TABLET | Refills: 2 | Status: SHIPPED | OUTPATIENT
Start: 2020-08-04 | End: 2020-11-09

## 2020-08-17 NOTE — TELEPHONE ENCOUNTER
Pt needs to have a scope scheduled prior to o/v f u  pt is waiting for her work schedule to come out prior to scheduling a procedure   Will wait for pt to have procedure scheduled to schedule f u     ce

## 2020-08-31 NOTE — TELEPHONE ENCOUNTER
Romel Watson has been contacted to sched egd from last ov with no response-please advise   Thank you

## 2020-08-31 NOTE — TELEPHONE ENCOUNTER
Called and spoke with patient  She has her schedule available now and can be contacted to schedule EGD  Antonieta Bain, can we contact her tomorrow after 12:00 p m as she has physical therapy in the morning  Thank you so much for following up

## 2020-09-10 ENCOUNTER — CLINICAL SUPPORT (OUTPATIENT)
Dept: GASTROENTEROLOGY | Facility: CLINIC | Age: 55
End: 2020-09-10

## 2020-09-10 VITALS — BODY MASS INDEX: 33.32 KG/M2 | WEIGHT: 200 LBS | HEIGHT: 65 IN

## 2020-09-10 DIAGNOSIS — K21.9 GERD WITHOUT ESOPHAGITIS: Primary | ICD-10-CM

## 2020-09-10 NOTE — PROGRESS NOTES
Why does your doctor want you to have this procedure? reflux    Do you have kidney disease?  no  If yes, are you on dialysis :     Have you had diverticulitis within the past 2 months? no    Are you diabetic?  no  If yes, insulin dependent: If yes, provide diabetic instructions sheet     Do take iron supplements?  no  If yes, instruct patient to hold iron supplement for 7 days prior    Are you on a blood thinner? no   Was the blood thinner sheet complete and faxed to cardiologist no  Plavix (clopidogrel), Coumadin (warfarin), Lovenox (enoxaparin), Xarelto (rivaroxaban), Pradaxa(dabigatran), Eliquis(apixaban) Savaysa/Lixiana (edoxapan)    Do you have an automatic implantable cardiac defibrillator (AICD)/pacemaker ([facility])? no  Was AICD/pacemaker sheet completed and faxed to cardiologist? no    Are you on home oxygen? no  If yes, continuous or nocturnal:     Have you been treated for MRSA, VRE or any communicable diseases? no    Heart attack, stroke, or stent within 3 months? no  Schedule at Hospital if within 3-6 months   Use nitroglycerin for chest pain in the last 6 months? no    History of organ  transplant?  no   If yes, notify Endo      History of neck/throat/tongue surgery or cancer? yes IF yes, notify Endo  lymph node removed right side     Any problems with anesthesia in the past? no     Was stool C diff ordered?  no Stool specimen needs to be completed prior to procedure    Do have any facial or body piercings?no     Do you have a latex allergy? yes hives     Do have an allergy to metals?  (Bravo study only) no     If pediatric patient, was consent faxed to pediatrician no     Patient rights reviewed yes

## 2020-09-16 ENCOUNTER — ANESTHESIA EVENT (OUTPATIENT)
Dept: GASTROENTEROLOGY | Facility: AMBULATORY SURGERY CENTER | Age: 55
End: 2020-09-16

## 2020-09-16 ENCOUNTER — ANESTHESIA (OUTPATIENT)
Dept: GASTROENTEROLOGY | Facility: AMBULATORY SURGERY CENTER | Age: 55
End: 2020-09-16

## 2020-09-16 ENCOUNTER — HOSPITAL ENCOUNTER (OUTPATIENT)
Dept: GASTROENTEROLOGY | Facility: AMBULATORY SURGERY CENTER | Age: 55
Discharge: HOME | End: 2020-09-16
Payer: COMMERCIAL

## 2020-09-16 VITALS — HEART RATE: 71 BPM

## 2020-09-16 VITALS
HEIGHT: 65 IN | WEIGHT: 195 LBS | SYSTOLIC BLOOD PRESSURE: 108 MMHG | OXYGEN SATURATION: 97 % | DIASTOLIC BLOOD PRESSURE: 64 MMHG | RESPIRATION RATE: 18 BRPM | TEMPERATURE: 96.9 F | BODY MASS INDEX: 32.49 KG/M2 | HEART RATE: 69 BPM

## 2020-09-16 DIAGNOSIS — K21.9 GERD WITHOUT ESOPHAGITIS: ICD-10-CM

## 2020-09-16 PROCEDURE — 43239 EGD BIOPSY SINGLE/MULTIPLE: CPT | Performed by: INTERNAL MEDICINE

## 2020-09-16 PROCEDURE — 88305 TISSUE EXAM BY PATHOLOGIST: CPT | Performed by: PATHOLOGY

## 2020-09-16 RX ORDER — PROPOFOL 10 MG/ML
INJECTION, EMULSION INTRAVENOUS AS NEEDED
Status: DISCONTINUED | OUTPATIENT
Start: 2020-09-16 | End: 2020-09-16

## 2020-09-16 RX ORDER — SODIUM CHLORIDE 9 MG/ML
75 INJECTION, SOLUTION INTRAVENOUS CONTINUOUS
Status: DISCONTINUED | OUTPATIENT
Start: 2020-09-16 | End: 2020-09-20 | Stop reason: HOSPADM

## 2020-09-16 RX ADMIN — PROPOFOL 150 MG: 10 INJECTION, EMULSION INTRAVENOUS at 10:27

## 2020-09-16 RX ADMIN — SODIUM CHLORIDE: 9 INJECTION, SOLUTION INTRAVENOUS at 10:17

## 2020-09-16 RX ADMIN — SODIUM CHLORIDE 75 ML/HR: 9 INJECTION, SOLUTION INTRAVENOUS at 10:21

## 2020-09-16 RX ADMIN — PROPOFOL 50 MG: 10 INJECTION, EMULSION INTRAVENOUS at 10:30

## 2020-09-16 NOTE — ANESTHESIA PREPROCEDURE EVALUATION
Procedure:  EGD    Relevant Problems   CARDIO   (+) Chest pain   (+) Supraventricular tachycardia (HCC)      ENDO   (+) Hypothyroidism   (+) Hypothyroidism due to Hashimoto's thyroiditis      GI/HEPATIC   (+) GERD without esophagitis      HEMATOLOGY   (+) 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)      NEURO/PSYCH   (+) History of colonic polyps        Physical Exam    Airway    Mallampati score: II  TM Distance: >3 FB  Neck ROM: full     Dental   No notable dental hx     Cardiovascular  Cardiovascular exam normal    Pulmonary  Pulmonary exam normal     Other Findings        Anesthesia Plan  ASA Score- 2     Anesthesia Type- IV sedation with anesthesia with ASA Monitors  Additional Monitors:   Airway Plan:           Plan Factors-    Chart reviewed  Patient summary reviewed  Induction- intravenous  Postoperative Plan-     Informed Consent- Anesthetic plan and risks discussed with patient

## 2020-09-16 NOTE — DISCHARGE INSTRUCTIONS
Gastritis   WHAT YOU NEED TO KNOW:   What is gastritis? Gastritis is inflammation or irritation of the lining of your stomach  What increases my risk for gastritis? · Infection with bacteria, a virus, or a parasite    · NSAIDs, aspirin, or steroid medicine    · Use of tobacco products or alcohol    · Trauma such as an injury to your stomach or intestine    · Autoimmune disorders such as diabetes, thyroid disease, or Crohn disease    · Stress    · Age older than 60 years    · Illegal drugs, such as cocaine  What are the signs and symptoms of gastritis? · Stomach pain, burning, or tenderness when you press on it    · Stomach fullness or tightness    · Nausea or vomiting    · Loss of appetite, or feeling full quickly when you eat    · Bad breath    · Fatigue or feeling more tired than usual    · Heartburn  How is gastritis diagnosed? Your healthcare provider will ask about your signs and symptoms and examine you  You may need any of the following:  · Blood tests  may be used to show an infection, dehydration, or anemia (low red blood cell levels)  · A bowel movement sample  may be tested for blood or the germ that may be causing your gastritis  · A breath test  may show if H pylori is causing your gastritis  You will be given a liquid to drink  Then you will breathe into a bag  Your healthcare provider will measure the amount of carbon dioxide in your breath  Extra amounts of carbon dioxide may mean you have an H pylori infection  · An endoscopy  may be used to look for irritation or bleeding in your stomach  Your healthcare provider will use an endoscope (tube with a light and camera on the end) during the procedure  He or she may take a sample from your stomach to be tested  How is gastritis treated? Your symptoms may go away without treatment  Treatment will depend on what is causing your gastritis  Your healthcare provider may recommend changes to the medicines you take   Medicines may be given to help treat a bacterial infection or decrease stomach acid  How can I manage or prevent gastritis? · Do not smoke  Nicotine and other chemicals in cigarettes and cigars can make your symptoms worse and cause lung damage  Ask your healthcare provider for information if you currently smoke and need help to quit  E-cigarettes or smokeless tobacco still contain nicotine  Talk to your healthcare provider before you use these products  · Do not drink alcohol  Alcohol can prevent healing and make your gastritis worse  Talk to your healthcare provider if you need help to stop drinking  · Do not take NSAIDs or aspirin unless directed  These and similar medicines can cause irritation of your stomach lining  If your healthcare provider says it is okay to take NSAIDs, take them with food  · Do not eat foods that cause irritation  Foods such as oranges and salsa can cause burning or pain  Eat a variety of healthy foods  Examples include fruits (not citrus), vegetables, low-fat dairy products, beans, whole-grain breads, and lean meats and fish  Try to eat small meals, and drink water with your meals  Do not eat for at least 3 hours before you go to bed  · Find ways to relax and decrease stress  Stress can increase stomach acid and make gastritis worse  Activities such as yoga, meditation, or listening to music can help you relax  Spend time with friends, or do things you enjoy  Call 911 for any of the following:   · You develop chest pain or shortness of breath  When should I seek immediate care? · You vomit blood  · You have black or bloody bowel movements  · You have severe stomach or back pain  When should I contact my healthcare provider? · You have a fever  · You have new or worsening symptoms, even after treatment  · You have questions or concerns about your condition or care  CARE AGREEMENT:   You have the right to help plan your care   Learn about your health condition and how it may be treated  Discuss treatment options with your caregivers to decide what care you want to receive  You always have the right to refuse treatment  The above information is an  only  It is not intended as medical advice for individual conditions or treatments  Talk to your doctor, nurse or pharmacist before following any medical regimen to see if it is safe and effective for you  © 2017 [employer] Information is for End User's use only and may not be sold, redistributed or otherwise used for commercial purposes  All illustrations and images included in CareNotes® are the copyrighted property of [employer]  or Vinay Tran

## 2020-09-16 NOTE — PROGRESS NOTES
Dr Ayan Davis spoke with pt and reviewed results  Pt without questions  Given written and verbal instructions

## 2020-10-30 ENCOUNTER — TELEMEDICINE (OUTPATIENT)
Dept: GASTROENTEROLOGY | Facility: CLINIC | Age: 55
End: 2020-10-30
Payer: COMMERCIAL

## 2020-10-30 VITALS — HEIGHT: 65 IN | WEIGHT: 200 LBS | BODY MASS INDEX: 33.32 KG/M2

## 2020-10-30 DIAGNOSIS — Z86.010 HISTORY OF COLONIC POLYPS: ICD-10-CM

## 2020-10-30 DIAGNOSIS — K21.9 GERD WITHOUT ESOPHAGITIS: Primary | ICD-10-CM

## 2020-10-30 PROCEDURE — 99213 OFFICE O/P EST LOW 20 MIN: CPT | Performed by: INTERNAL MEDICINE

## 2020-10-30 PROCEDURE — 3008F BODY MASS INDEX DOCD: CPT | Performed by: INTERNAL MEDICINE

## 2020-10-31 ENCOUNTER — TRANSCRIBE ORDERS (OUTPATIENT)
Dept: ADMINISTRATIVE | Facility: HOSPITAL | Age: 55
End: 2020-10-31

## 2020-10-31 DIAGNOSIS — H35.81 RETINAL EDEMA: Primary | ICD-10-CM

## 2020-10-31 DIAGNOSIS — Q99.8 OTHER SPECIFIED CHROMOSOME ABNORMALITIES: ICD-10-CM

## 2020-11-07 DIAGNOSIS — K21.9 GERD WITHOUT ESOPHAGITIS: ICD-10-CM

## 2020-11-09 RX ORDER — FAMOTIDINE 20 MG/1
TABLET, FILM COATED ORAL
Qty: 180 TABLET | Refills: 1 | Status: SHIPPED | OUTPATIENT
Start: 2020-11-09 | End: 2021-03-23

## 2020-11-19 ENCOUNTER — HOSPITAL ENCOUNTER (OUTPATIENT)
Dept: NON INVASIVE DIAGNOSTICS | Facility: HOSPITAL | Age: 55
Discharge: HOME | End: 2020-11-19
Payer: COMMERCIAL

## 2020-11-19 DIAGNOSIS — H35.81 RETINAL EDEMA: ICD-10-CM

## 2020-11-19 DIAGNOSIS — Q99.8 OTHER SPECIFIED CHROMOSOME ABNORMALITIES: ICD-10-CM

## 2020-11-19 PROCEDURE — 93880 EXTRACRANIAL BILAT STUDY: CPT

## 2020-11-19 PROCEDURE — 93880 EXTRACRANIAL BILAT STUDY: CPT | Performed by: INTERNAL MEDICINE

## 2020-11-24 DIAGNOSIS — G43.009 MIGRAINE WITHOUT AURA AND WITHOUT STATUS MIGRAINOSUS, NOT INTRACTABLE: ICD-10-CM

## 2020-11-24 RX ORDER — ZOLMITRIPTAN 2.5 MG/1
2.5 TABLET, ORALLY DISINTEGRATING ORAL DAILY PRN
Qty: 18 TABLET | Refills: 3 | Status: SHIPPED | OUTPATIENT
Start: 2020-11-24 | End: 2021-09-20 | Stop reason: SDUPTHER

## 2020-12-03 ENCOUNTER — CLINICAL SUPPORT (OUTPATIENT)
Dept: FAMILY MEDICINE CLINIC | Facility: HOSPITAL | Age: 55
End: 2020-12-03
Payer: COMMERCIAL

## 2020-12-03 ENCOUNTER — TELEPHONE (OUTPATIENT)
Dept: ENDOCRINOLOGY | Facility: HOSPITAL | Age: 55
End: 2020-12-03

## 2020-12-03 DIAGNOSIS — Z23 ENCOUNTER FOR IMMUNIZATION: ICD-10-CM

## 2020-12-03 PROCEDURE — 90471 IMMUNIZATION ADMIN: CPT

## 2020-12-03 PROCEDURE — 90682 RIV4 VACC RECOMBINANT DNA IM: CPT

## 2020-12-04 DIAGNOSIS — E03.9 HYPOTHYROIDISM, UNSPECIFIED TYPE: Primary | ICD-10-CM

## 2020-12-04 DIAGNOSIS — E03.8 HYPOTHYROIDISM DUE TO HASHIMOTO'S THYROIDITIS: Primary | ICD-10-CM

## 2020-12-04 DIAGNOSIS — E06.3 HYPOTHYROIDISM DUE TO HASHIMOTO'S THYROIDITIS: Primary | ICD-10-CM

## 2020-12-04 RX ORDER — LEVOTHYROXINE SODIUM 112 UG/1
112 TABLET ORAL DAILY
Qty: 30 TABLET | Refills: 5 | Status: SHIPPED | OUTPATIENT
Start: 2020-12-04 | End: 2021-01-18

## 2021-01-14 ENCOUNTER — HOSPITAL ENCOUNTER (OUTPATIENT)
Dept: MAMMOGRAPHY | Facility: IMAGING CENTER | Age: 56
Discharge: HOME | End: 2021-01-14
Payer: COMMERCIAL

## 2021-01-14 VITALS — HEIGHT: 65 IN | BODY MASS INDEX: 33.32 KG/M2 | WEIGHT: 200 LBS

## 2021-01-14 DIAGNOSIS — Z12.31 VISIT FOR SCREENING MAMMOGRAM: ICD-10-CM

## 2021-01-14 PROCEDURE — 77063 BREAST TOMOSYNTHESIS BI: CPT

## 2021-01-14 PROCEDURE — 77067 SCR MAMMO BI INCL CAD: CPT

## 2021-01-18 ENCOUNTER — OFFICE VISIT (OUTPATIENT)
Dept: ENDOCRINOLOGY | Facility: HOSPITAL | Age: 56
End: 2021-01-18
Payer: COMMERCIAL

## 2021-01-18 VITALS
DIASTOLIC BLOOD PRESSURE: 80 MMHG | SYSTOLIC BLOOD PRESSURE: 122 MMHG | HEIGHT: 65 IN | BODY MASS INDEX: 33.26 KG/M2 | WEIGHT: 199.6 LBS | HEART RATE: 72 BPM

## 2021-01-18 DIAGNOSIS — E03.8 HYPOTHYROIDISM DUE TO HASHIMOTO'S THYROIDITIS: Primary | ICD-10-CM

## 2021-01-18 DIAGNOSIS — E06.3 HYPOTHYROIDISM DUE TO HASHIMOTO'S THYROIDITIS: Primary | ICD-10-CM

## 2021-01-18 PROCEDURE — 1036F TOBACCO NON-USER: CPT | Performed by: PHYSICIAN ASSISTANT

## 2021-01-18 PROCEDURE — 99214 OFFICE O/P EST MOD 30 MIN: CPT | Performed by: PHYSICIAN ASSISTANT

## 2021-01-18 PROCEDURE — 3008F BODY MASS INDEX DOCD: CPT | Performed by: PHYSICIAN ASSISTANT

## 2021-01-18 RX ORDER — LEVOTHYROXINE SODIUM 0.12 MG/1
125 TABLET ORAL DAILY
Qty: 30 TABLET | Refills: 5 | Status: SHIPPED | OUTPATIENT
Start: 2021-01-18 | End: 2021-07-06 | Stop reason: SDUPTHER

## 2021-01-18 NOTE — PROGRESS NOTES
Maryam James 54 y o  female MRN: [id number]    Encounter: [id number]      Assessment/Plan     Assessment: This is a 54y o -year-old female with hypothyroidism due to Hashimoto's thyroiditis and goiter  Plan:  1  Hypothyroidism due to Hashimoto's thyroiditis:  Most recent set of thyroid lab work shows that she is still a little on the productive side  At this time will increase her levothyroxine 125 mcg daily  Recheck lab work in 6 weeks  If needed, may have to make further adjustments  Please contact the office if there is any change in symptoms, otherwise follow-up in 1 year  2  Goiter:  Most recent ultrasound was 2017, and no significant changes were noted  Does feel slightly enlarged, but is asymptomatic  Will continue to monitor at this time  CC:  Thyroid follow-up    History of Present Illness     HPI:  Maryam James is a 47y o  year old female with history of hypothyroidism due to Hashimoto's thyroiditis with goiter here for follow-up  She was diagnosed with Hashimoto's thyroiditis in 2003  Thyroid hormone was started when TSH was over 7 and has been adjusted over the years  She is currently taking levothyroxine 112 mcg daily  dose was adjusted about 1 month ago  She was found to be hypothyroid  She was recently diagnosed with lymphoma and had mass removed on May 4, 2020  This was followed up with radiation therapy  Is continuing to follow up with ENT and Heme/Onc  She has chronic constipation  She has occasional palpitations  She is fatigued, but this is chronic  Weight is currently stable  She denies heat or cold intolerance, tremors, anxiety or depression, diarrhea, or brittle nails  She has some dry skin and unchanged hair loss  She continues to have diplopia due to a conversion disorder in her vision  She does have a history of goiter with no compressive thyroid symptoms in the past   Last thyroid ultrasound in 2017 showed only a mild goiter    She denies compressive thyroid symptoms or difficulties with swallowing  She does have a consistently scratchy throat, but was evaluated by ENT and was normal     Review of Systems   Constitutional: Positive for fatigue  Negative for activity change, appetite change, diaphoresis and unexpected weight change  HENT: Positive for sore throat  Negative for trouble swallowing and voice change  Eyes: Positive for visual disturbance  Respiratory: Negative for chest tightness and shortness of breath  Cardiovascular: Negative for chest pain, palpitations and leg swelling  Gastrointestinal: Positive for constipation  Negative for abdominal pain and diarrhea  Endocrine: Negative for cold intolerance, heat intolerance, polydipsia, polyphagia and polyuria  Skin: Negative for rash  Neurological: Negative for dizziness, tremors, light-headedness, numbness and headaches  Hematological: Negative for adenopathy  Does not bruise/bleed easily  Psychiatric/Behavioral: Negative for dysphoric mood and sleep disturbance  The patient is not nervous/anxious          Historical Information   Past Medical History:   Diagnosis Date    Cancer ([facility] )     non-hogikins lymphoma     Convergence insufficiency     Disease of thyroid gland     Endometrial hyperplasia     Enhanced S-cone syndrome     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    GERD (gastroesophageal reflux disease)     Irregular heart beat     SVT    Non Hodgkin's lymphoma ([facility] )     Overweight     Retinitis pigmentosa      Past Surgical History:   Procedure Laterality Date    ANAL/SPHINCTEROPLASTY      ANTERIOR AND POSTERIOR VAGINAL REPAIR      BLADDER SUSPENSION      DILATION AND CURETTAGE OF UTERUS      X 2    HYSTERECTOMY  2014    OOPHORECTOMY Bilateral 2014    with hysterectomy    TONSILLECTOMY AND ADENOIDECTOMY      US GUIDED LYMPH NODE BIOPSY LEFT  4/21/2020     Social History   Social History     Substance and Sexual Activity   Alcohol Use Yes    Frequency: Monthly or less    Comment: rare     Social History     Substance and Sexual Activity   Drug Use No     Social History     Tobacco Use   Smoking Status Never Smoker   Smokeless Tobacco Never Used     Family History:   Family History   Problem Relation Age of Onset    Cancer Mother         lung    COPD Father     Diabetes type II Brother         on insulin    Substance Abuse Sister     HIV Sister     No Known Problems Daughter     Other Family         Aortic valve replacement     No Known Problems Maternal Grandmother     No Known Problems Paternal Grandmother     Throat cancer Maternal Grandfather     No Known Problems Paternal Grandfather     Mental illness Neg Hx     Colon cancer Neg Hx     Colon polyps Neg Hx        Meds/Allergies   Current Outpatient Medications   Medication Sig Dispense Refill    estradiol (ESTRACE) 0 5 MG tablet Take 0 5 mg by mouth daily       famotidine (PEPCID) 20 mg tablet TAKE 1 TABLET BY MOUTH TWICE A  tablet 1    ibuprofen (MOTRIN) 200 mg tablet Take 600 mg by mouth every 6 (six) hours as needed for mild pain      levothyroxine 112 mcg tablet Take 1 tablet (112 mcg total) by mouth daily 30 tablet 5    Lutein 20 MG CAPS Take by mouth daily      Multiple Vitamins-Minerals (MULTIVITAMIN GUMMIES WOMENS) CHEW Chew 2 each daily      omeprazole (PriLOSEC) 40 MG capsule TAKE 1 CAPSULE BY MOUTH TWICE A DAY (Patient taking differently: Take 40 mg by mouth as needed ) 180 capsule 0    verapamil (CALAN-SR) 120 mg CR tablet Take 120 mg by mouth      VITAMIN D, ERGOCALCIFEROL, PO Take 5,000 Units by mouth       ZOLMitriptan (ZOMIG-ZMT) 2 5 MG disintegrating tablet Take 1 tablet (2 5 mg total) by mouth daily as needed for migraine 18 tablet 3    famotidine (PEPCID) 40 MG tablet Take 1 tablet (40 mg total) by mouth daily at bedtime (Patient not taking: Reported on 1/18/2021) 30 tablet 2     No current facility-administered medications for this visit        Allergies   Allergen Reactions    Latex Rash and Hives    Iv Contrast [Iodinated Diagnostic Agents]     Other      Other reaction(s): hair dye causes rash  Orange coating    Lansoprazole Rash       Objective   Vitals: Blood pressure 122/80, pulse 72, height 5' 5" (1 651 m), weight 90 5 kg (199 lb 9 6 oz)  Physical Exam  Vitals signs and nursing note reviewed  Constitutional:       General: She is not in acute distress  Appearance: Normal appearance  She is not diaphoretic  HENT:      Head: Normocephalic and atraumatic  Eyes:      General: No scleral icterus  Extraocular Movements: Extraocular movements intact  Conjunctiva/sclera: Conjunctivae normal       Pupils: Pupils are equal, round, and reactive to light  Neck:      Musculoskeletal: Normal range of motion  Thyroid: Thyromegaly present  No thyroid mass or thyroid tenderness  Cardiovascular:      Rate and Rhythm: Normal rate and regular rhythm  Heart sounds: No murmur  Pulmonary:      Effort: Pulmonary effort is normal  No respiratory distress  Breath sounds: Normal breath sounds  No wheezing  Musculoskeletal:         General: No swelling  Lymphadenopathy:      Cervical: No cervical adenopathy  Right cervical: No superficial, deep or posterior cervical adenopathy  Left cervical: No superficial, deep or posterior cervical adenopathy  Neurological:      Mental Status: She is alert and oriented to person, place, and time  Mental status is at baseline  Sensory: No sensory deficit  Deep Tendon Reflexes:      Reflex Scores:       Patellar reflexes are 2+ on the right side and 2+ on the left side  Psychiatric:         Mood and Affect: Mood normal          Behavior: Behavior normal          Thought Content: Thought content normal          The history was obtained from the review of the chart, patient  Lab Results:     lab work completed on January 12, 2021 shows a TSH of 4 04 and a free T4 of 1 12      Imaging Studies:   Results for orders placed during the hospital encounter of 04/03/17   US thyroid    Impression    Markedly heterogeneous thyroid parenchyma, somewhat progressed when compared to prior examination  Morphologically normal and not significantly enlarged perithyroidal lymph nodes identified, similar in retrospect when compared to April 19, 2013  No suspicious discrete nodule or mass  Workstation performed: [id number]         I have personally reviewed pertinent reports  Portions of the record may have been created with voice recognition software  Occasional wrong word or "sound a like" substitutions may have occurred due to the inherent limitations of voice recognition software  Read the chart carefully and recognize, using context, where substitutions have occurred

## 2021-01-18 NOTE — PATIENT INSTRUCTIONS
Increase levothyroxine to 125 mcg daily  Get lab work completed in 6 weeks  Follow up in 1 year, or call if having any symptoms

## 2021-03-23 DIAGNOSIS — K21.9 GERD WITHOUT ESOPHAGITIS: ICD-10-CM

## 2021-03-23 RX ORDER — FAMOTIDINE 20 MG/1
TABLET, FILM COATED ORAL
Qty: 180 TABLET | Refills: 1 | Status: SHIPPED | OUTPATIENT
Start: 2021-03-23 | End: 2022-05-27

## 2021-06-04 ENCOUNTER — TELEPHONE (OUTPATIENT)
Dept: GASTROENTEROLOGY | Facility: CLINIC | Age: 56
End: 2021-06-04

## 2021-06-04 VITALS — BODY MASS INDEX: 32.99 KG/M2 | HEIGHT: 65 IN | WEIGHT: 198 LBS

## 2021-06-04 NOTE — TELEPHONE ENCOUNTER
Why does your doctor want you to have this procedure? Hx of polyps    Do you have kidney disease?  no  If yes, are you on dialysis :     Have you had diverticulitis within the past 2 months? no    Are you diabetic?  no  If yes, insulin dependent: If yes, provide diabetic instructions sheet     Do take iron supplements?  no  If yes, instruct patient to hold iron supplement for 7 days prior    Are you on a blood thinner? no   Was the blood thinner sheet complete and faxed to cardiologist no  Plavix (clopidogrel), Coumadin (warfarin), Lovenox (enoxaparin), Xarelto (rivaroxaban), Pradaxa(dabigatran), Eliquis(apixaban) Savaysa/Lixiana (edoxapan)    Do you have an automatic implantable cardiac defibrillator (AICD)/pacemaker ([facility])? no  Was AICD/pacemaker sheet completed and faxed to cardiologist? no    Are you on home oxygen? no  If yes, continuous or nocturnal:     Have you been treated for MRSA, VRE or any communicable diseases? no    Heart attack, stroke, or stent within 3 months? no  Schedule at Hospital if within 3-6 months   Use nitroglycerin for chest pain in the last 6 months? no    History of organ  transplant?  no   If yes, notify Endo      History of neck/throat/tongue surgery or cancer? Yes, lymph node removed from neck, radiation  IF yes, notify Endo      Any problems with anesthesia in the past? no     Was stool C diff ordered?  no Stool specimen needs to be completed prior to procedure    Do have any facial or body piercings?no     Do you have a latex allergy? rash, itching    Do have an allergy to metals? ([facility] study only) no     If pediatric patient, was consent faxed to pediatrician no     Patient rights reviewed yes     Miralax prep given and instructions emailed to patient

## 2021-06-10 ENCOUNTER — ANESTHESIA EVENT (OUTPATIENT)
Dept: GASTROENTEROLOGY | Facility: AMBULATORY SURGERY CENTER | Age: 56
End: 2021-06-10

## 2021-06-10 ENCOUNTER — ANESTHESIA EVENT (OUTPATIENT)
Dept: ANESTHESIOLOGY | Facility: AMBULATORY SURGERY CENTER | Age: 56
End: 2021-06-10

## 2021-06-10 ENCOUNTER — ANESTHESIA (OUTPATIENT)
Dept: ANESTHESIOLOGY | Facility: AMBULATORY SURGERY CENTER | Age: 56
End: 2021-06-10

## 2021-06-10 NOTE — ANESTHESIA PREPROCEDURE EVALUATION
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

## 2021-06-11 ENCOUNTER — ANESTHESIA (OUTPATIENT)
Dept: GASTROENTEROLOGY | Facility: AMBULATORY SURGERY CENTER | Age: 56
End: 2021-06-11

## 2021-06-11 ENCOUNTER — HOSPITAL ENCOUNTER (OUTPATIENT)
Dept: GASTROENTEROLOGY | Facility: AMBULATORY SURGERY CENTER | Age: 56
Discharge: HOME | End: 2021-06-11
Payer: COMMERCIAL

## 2021-06-11 VITALS
OXYGEN SATURATION: 95 % | SYSTOLIC BLOOD PRESSURE: 104 MMHG | RESPIRATION RATE: 15 BRPM | DIASTOLIC BLOOD PRESSURE: 66 MMHG | HEART RATE: 68 BPM | TEMPERATURE: 98.7 F

## 2021-06-11 DIAGNOSIS — Z86.010 HISTORY OF COLON POLYPS: ICD-10-CM

## 2021-06-11 PROCEDURE — 45380 COLONOSCOPY AND BIOPSY: CPT | Performed by: INTERNAL MEDICINE

## 2021-06-11 PROCEDURE — 88305 TISSUE EXAM BY PATHOLOGIST: CPT | Performed by: PATHOLOGY

## 2021-06-11 RX ORDER — SODIUM CHLORIDE 9 MG/ML
50 INJECTION, SOLUTION INTRAVENOUS CONTINUOUS
Status: DISCONTINUED | OUTPATIENT
Start: 2021-06-11 | End: 2021-06-11

## 2021-06-11 RX ORDER — PROPOFOL 10 MG/ML
INJECTION, EMULSION INTRAVENOUS AS NEEDED
Status: DISCONTINUED | OUTPATIENT
Start: 2021-06-11 | End: 2021-06-11

## 2021-06-11 RX ADMIN — PROPOFOL 50 MG: 10 INJECTION, EMULSION INTRAVENOUS at 11:55

## 2021-06-11 RX ADMIN — PROPOFOL 100 MG: 10 INJECTION, EMULSION INTRAVENOUS at 11:50

## 2021-06-11 RX ADMIN — PROPOFOL 50 MG: 10 INJECTION, EMULSION INTRAVENOUS at 11:54

## 2021-06-11 RX ADMIN — SODIUM CHLORIDE 50 ML/HR: 9 INJECTION, SOLUTION INTRAVENOUS at 11:27

## 2021-06-11 RX ADMIN — PROPOFOL 50 MG: 10 INJECTION, EMULSION INTRAVENOUS at 12:00

## 2021-06-11 NOTE — DISCHARGE INSTRUCTIONS
Colorectal Polyps   WHAT YOU NEED TO KNOW:   What are colorectal polyps? Colorectal polyps are small growths of tissue in the lining of the colon and rectum  Most polyps are hyperplastic polyps and are usually benign (noncancerous)  Certain types of polyps, called adenomatous polyps, may turn into cancer  What increases my risk of colorectal polyps? The exact cause of colorectal polyps is unknown  The following may increase your risk:  · Older age    · A diet of foods high in fat and low in fiber     · Family history of polyps    · Intestinal diseases, such as Crohn's disease or ulcerative colitis    · An unhealthy lifestyle, such as physical inactivity, smoking, or drinking alcohol    · Obesity    What are the signs and symptoms of colorectal polyps? · Blood in your bowel movement or bleeding from the rectum    · Change in bowel movement habits, such as diarrhea and constipation    · Abdominal pain    How are colorectal polyps diagnosed? You should have fecal blood screening once a year for colorectal disease if you are over 48years old  You should be screened earlier if you have an intestinal disease or a family history of polyps or colorectal cancer  During this screening, a sample of your bowel movement is checked for blood, which may be an early sign of colorectal polyps or cancer  You may also need any of the following tests:  · Digital rectal exam:  Your healthcare provider will examine your anus and use a finger to check your rectum for polyps  · Barium enema: A barium enema is an x-ray of the colon  A tube is put into your anus, and a liquid called barium is put through the tube  Barium is used so that healthcare providers can see your colon better on the x-ray film  · Virtual colonoscopy: This is a CT scan that takes pictures of the inside of your colon and rectum  A small, flexible tube is put into your rectum and air or carbon dioxide (gas) is used to expand your colon   This lets healthcare providers clearly see your colon and any polyps on a monitor  · Colonoscopy or sigmoidoscopy: These procedures help your healthcare provider see the inside of your colon using a flexible tube with a small light and camera on the end  During a sigmoidoscopy, your healthcare provider will only look at rectum and lower colon  During a colonoscopy, healthcare providers will look at the full length of your colon  Healthcare providers may remove a small amount of tissue from the colon for a biopsy  How are colorectal polyps treated? A polypectomy is a minimally invasive procedure to remove your polyps  They may be removed during a colonoscopy or sigmoidoscopy  Your healthcare provider may need to remove the polyps with a laparoscope  Laparoscopy is done by inserting a small, flexible scope into incisions made on your abdomen  What are the risks of colorectal polyps? You may bleed during a colonoscopy procedure  Your bowel may be perforated (torn) when polyps are removed  This may lead to an open abdominal surgery  During surgery, you may bleed too much or get an infection  Adenomatous polyps that are not removed may turn into cancer and become more difficult to treat  Where can I find support and more information? · Cris Martinez ([facility])  [address], [place] [address]  Phone: [phone]  Web Address: Dilip Ivy  [facility] nih gov    When should I contact my healthcare provider? · You have a fever  · You have chills, a cough, or feel weak and achy  · You have abdominal pain that does not go away or gets worse after you take medicine  · Your abdomen is swollen  · You are losing weight without trying  · You have questions or concerns about your condition or care  When should I seek immediate care or call 911? · You have sudden shortness of breath       · You have a fast heart rate, fast breathing, or are too dizzy to stand up  · You have severe abdominal pain  · You see blood in your bowel movement  CARE AGREEMENT:   You have the right to help plan your care  Learn about your health condition and how it may be treated  Discuss treatment options with your healthcare providers to decide what care you want to receive  You always have the right to refuse treatment  The above information is an  only  It is not intended as medical advice for individual conditions or treatments  Talk to your doctor, nurse or pharmacist before following any medical regimen to see if it is safe and effective for you  © Copyright [address] Information is for End User's use only and may not be sold, redistributed or otherwise used for commercial purposes  All illustrations and images included in CareNotes® are the copyrighted property of A D A M , Inc  or [facility] Maciej Javier   Diverticulosis   WHAT YOU NEED TO KNOW:   What is diverticulosis? Diverticulosis is a condition that causes small pockets called diverticula to form in your intestine  These pockets make it difficult for bowel movements to pass through your digestive system  What causes diverticulosis? Diverticula form when muscles have to work hard to move bowel movements through the intestine  The force causes bulges to form at weak areas in the intestine  This may happen if you eat foods that are low in fiber  Fiber helps give your bowel movements more bulk so they are larger and easier to move through your colon  The following may increase your risk of diverticulosis:  · A history of constipation    · Age 36 or older    · Obesity    · Lack of exercise    What are the signs and symptoms of diverticulosis? Diverticulosis usually does not cause any signs or symptoms  It may cause any of the following in some people:  · Pain or discomfort in your lower abdomen    · Abdominal bloating    · Constipation or diarrhea    How is diverticulosis diagnosed?   Your healthcare provider will examine you and ask about your bowel movements, diet, and symptoms  He or she will also ask about any medical conditions you have or medicines you take  You may need any of the following:  · Blood tests  may be done to check for signs of inflammation  · A barium enema  is an x-ray of your colon that may show diverticula  A tube is put into your anus, and a liquid called barium is put through the tube  Barium is used so that healthcare providers can see your colon more clearly  · Flexible sigmoidoscopy  is a test to look for any changes in your lower intestines and rectum  It may also show the cause of any bleeding or pain  A soft, bendable tube with a light on the end will be put into your anus  It will then be moved forward into your intestine  · A colonoscopy  is used to look at your whole colon  A scope (long bendable tube with a light on the end) is used to take pictures  This test may show diverticula  · A CT scan , or CAT scan, may show diverticula  You may be given contrast liquid before the scan  Tell the healthcare provider if you have ever had an allergic reaction to contrast liquid  How is diverticulosis managed? The goal of treatment is to manage any symptoms you have and prevent other problems such as diverticulitis  Diverticulitis is swelling or infection of the diverticula  Your healthcare provider may recommend any of the following:  · Eat a variety of high-fiber foods  High-fiber foods help you have regular bowel movements  High-fiber foods include cooked beans, fruits, vegetables, and some cereals  Most adults need 25 to 35 grams of fiber each day  Your healthcare provider may recommend that you have more  Ask your healthcare provider how much fiber you need  Increase fiber slowly  You may have abdominal discomfort, bloating, and gas if you add fiber to your diet too quickly   You may need to take a fiber supplement if you are not getting enough fiber from food  · Medicines  to soften your bowel movements may be given  You may also need medicines to treat symptoms such as bloating and pain  · Drink liquids as directed  You may need to drink 2 to 3 liters (8 to 12 cups) of liquids every day  Ask your healthcare provider how much liquid to drink each day and which liquids are best for you  · Apply heat  on your abdomen for 20 to 30 minutes every 2 hours for as many days as directed  Heat helps decrease pain and muscle spasms  How can I help prevent diverticulitis or other symptoms? The following may help decrease your risk for diverticulitis or symptoms, such as bleeding  Talk to your provider about these or other things you can do to prevent problems that may occur with diverticulosis  · Exercise regularly  Ask your healthcare provider about the best exercise plan for you  Exercise can help you have regular bowel movements  Get 30 minutes of exercise on most days of the week  · Maintain a healthy weight  Ask your healthcare provider how much you should weigh  Ask him or her to help you create a weight loss plan if you are overweight  · Do not smoke  Nicotine and other chemicals in cigarettes increase your risk for diverticulitis  Ask your healthcare provider for information if you currently smoke and need help to quit  E-cigarettes or smokeless tobacco still contain nicotine  Talk to your healthcare provider before you use these products  · Ask your healthcare provider if it is safe to take NSAIDs  NSAIDs may increase your risk of diverticulitis  When should I seek immediate care? · You have severe pain on the left side of your lower abdomen  · Your bowel movements are bright or dark red  When should I contact my healthcare provider? · You have a fever and chills  · You feel dizzy or lightheaded  · You have nausea, or you are vomiting  · You have a change in your bowel movements      · You have questions or concerns about your condition or care  CARE AGREEMENT:   You have the right to help plan your care  Learn about your health condition and how it may be treated  Discuss treatment options with your healthcare providers to decide what care you want to receive  You always have the right to refuse treatment  The above information is an  only  It is not intended as medical advice for individual conditions or treatments  Talk to your doctor, nurse or pharmacist before following any medical regimen to see if it is safe and effective for you  © Copyright [address] Information is for End User's use only and may not be sold, redistributed or otherwise used for commercial purposes  All illustrations and images included in CareNotes® are the copyrighted property of [employer]  or [facility] Maciej Javier   Hemorrhoids   WHAT YOU NEED TO KNOW:   What are hemorrhoids? Hemorrhoids are swollen blood vessels inside your rectum (internal hemorrhoids) or on your anus (external hemorrhoids)  Sometimes a hemorrhoid may prolapse  This means it extends out of your anus  What increases my risk for hemorrhoids? · Pregnancy or obesity    · Straining or sitting for a long time during bowel movements    · Liver disease    · Weak muscles around the anus caused by older age, rectal surgery, or anal intercourse    · A lack of physical activity    · Chronic diarrhea or constipation    · A low-fiber diet    What are the signs and symptoms of hemorrhoids? · Pain or itching around your anus or inside your rectum    · Swelling or bumps around your anus    · Bright red blood in your bowel movement, on the toilet paper, or in the toilet bowl    · Tissue bulging out of your anus (prolapsed hemorrhoids)    · Incontinence (poor control over urine or bowel movements)    How are hemorrhoids diagnosed? Your healthcare provider will ask about your symptoms, the foods you eat, and your bowel movements   He or she will examine your anus for external hemorrhoids  You may need the following:  · A digital rectal exam  is a test to check for hemorrhoids  Your healthcare provider will put a gloved finger inside your anus to feel for the hemorrhoids  · An anoscopy  is a test that uses a scope (small tube with a light and camera on the end) to look at your hemorrhoids  How are hemorrhoids treated? Treatment will depend on your symptoms  You may need any of the following:  · Medicines  can help decrease pain and swelling, and soften your bowel movement  The medicine may be a pill, pad, cream, or ointment  · Procedures  may be used to shrink or remove your hemorrhoid  Examples include rubber-band ligation, sclerotherapy, and photocoagulation  These procedures may be done in your healthcare provider's office  Ask your healthcare provider for more information about these procedures  · Surgery  may be needed to shrink or remove your hemorrhoids  How can I manage my symptoms? · Apply ice on your anus for 15 to 20 minutes every hour or as directed  Use an ice pack, or put crushed ice in a plastic bag  Cover it with a towel before you apply it to your anus  Ice helps prevent tissue damage and decreases swelling and pain  · Take a sitz bath  Fill a bathtub with 4 to 6 inches of warm water  You may also use a sitz bath pan that fits inside a toilet bowl  Sit in the sitz bath for 15 minutes  Do this 3 times a day, and after each bowel movement  The warm water can help decrease pain and swelling  · Keep your anal area clean  Gently wash the area with warm water daily  Soap may irritate the area  After a bowel movement, wipe with moist towelettes or wet toilet paper  Dry toilet paper can irritate the area  How can I help prevent hemorrhoids? · Do not strain to have a bowel movement  Do not sit on the toilet too long  These actions can increase pressure on the tissues in your rectum and anus  · Drink plenty of liquids  Liquids can help prevent constipation  Ask how much liquid to drink each day and which liquids are best for you  · Eat a variety of high-fiber foods  Examples include fruits, vegetables, and whole grains  Ask your healthcare provider how much fiber you need each day  You may need to take a fiber supplement  · Exercise as directed  Exercise, such as walking, may make it easier to have a bowel movement  Ask your healthcare provider to help you create an exercise plan  · Do not have anal sex  Anal sex can weaken the skin around your rectum and anus  · Avoid heavy lifting  This can cause straining and increase your risk for another hemorrhoid  When should I seek immediate care? · You have severe pain in your rectum or around your anus  · You have severe pain in your abdomen and you are vomiting  · You have bleeding from your anus that soaks through your underwear  When should I contact my healthcare provider? · You have frequent and painful bowel movements  · Your hemorrhoid looks or feels more swollen than usual      · You do not have a bowel movement for 2 days or more  · You see or feel tissue coming through your anus  · You have questions or concerns about your condition or care  CARE AGREEMENT:   You have the right to help plan your care  Learn about your health condition and how it may be treated  Discuss treatment options with your healthcare providers to decide what care you want to receive  You always have the right to refuse treatment  The above information is an  only  It is not intended as medical advice for individual conditions or treatments  Talk to your doctor, nurse or pharmacist before following any medical regimen to see if it is safe and effective for you  © Copyright [address] Information is for End User's use only and may not be sold, redistributed or otherwise used for commercial purposes   All illustrations and images included in CareNotes® are the copyrighted property of [employer] D A M , Inc  or Patti Polanco  Colonoscopy   WHAT YOU NEED TO KNOW:   A colonoscopy is a procedure to examine the inside of your colon (intestine) with a scope  Polyps or tissue growths may have been removed during your colonoscopy  It is normal to feel bloated and to have some abdominal discomfort  You should be passing gas  If you have hemorrhoids or you had polyps removed, you may have a small amount of bleeding  DISCHARGE INSTRUCTIONS:   Seek care immediately if:    You have sudden, severe abdominal pain   You have problems swallowing   You have a large amount of black, sticky bowel movements or blood in your bowel movements   You have sudden trouble breathing   You feel weak, lightheaded, or faint or your heart beats faster than normal for you  Contact your healthcare provider if:    You have a fever and chills   You have nausea or are vomiting   Your abdomen is bloated or feels full and hard   You have abdominal pain   You have black, sticky bowel movements or blood in your bowel movements   You have not had a bowel movement for 3 days after your procedure   You have rash or hives   You have questions or concerns about your procedure  Activity:    Do not lift, strain, or run for 24 hours after your procedure   Rest after your procedure  You have been given medicine to relax you  Do not drive or make important decisions until the day after your procedure  Return to your normal activity as directed   Relieve gas and discomfort from bloating by lying on your right side with a heating pad on your abdomen  You may need to take short walks to help the gas move out  Eat small meals until bloating is relieved  Follow up with your healthcare provider as directed: Write down your questions so you remember to ask them during your visits       If you take a blood thinner, please review the specific instructions from your endoscopist about when you should resume it  These can be found in the Recommendation and Your Medication list sections of this After Visit Summary

## 2021-06-11 NOTE — H&P
History and Physical -  [facility]  Gerry Tabares 54 y o  female MRN: [id number]    HPI: Gerry Tabares is a 54y o  year old female who presents for surveillance colonoscopy secondary to personal history of polyps    REVIEW OF SYSTEMS: Per the HPI, and otherwise unremarkable      Historical Information   Past Medical History:   Diagnosis Date    Cancer ([facility] )     non-hogikins lymphoma     Convergence insufficiency     Disease of thyroid gland     Endometrial hyperplasia     Enhanced S-cone syndrome     Follicular lymphoma ([place] [address] ) 04/2020    GERD (gastroesophageal reflux disease)     Irregular heart beat     SVT    Non Hodgkin's lymphoma ([place] [address] )     Overweight     Retinitis pigmentosa      Past Surgical History:   Procedure Laterality Date    ANAL SPHINCTEROPLASTY      ANAL/SPHINCTEROPLASTY      ANTERIOR AND POSTERIOR VAGINAL REPAIR      BLADDER SUSPENSION      DILATION AND CURETTAGE OF UTERUS      X 2    HYSTERECTOMY  2014    OOPHORECTOMY Bilateral 2014    with hysterectomy    TONSILLECTOMY AND ADENOIDECTOMY      US GUIDED LYMPH NODE BIOPSY LEFT  4/21/2020     Social History   Social History     Substance and Sexual Activity   Alcohol Use Yes    Frequency: Monthly or less    Comment: rare     Social History     Substance and Sexual Activity   Drug Use Yes    Types: Marijuana    Comment: help to sleep     Social History     Tobacco Use   Smoking Status Never Smoker   Smokeless Tobacco Never Used     Family History   Problem Relation Age of Onset    Cancer Mother         lung    COPD Father     Diabetes type II Brother         on insulin    Substance Abuse Sister    [facility] HIV Sister     No Known Problems Daughter     Other Family         Aortic valve replacement     No Known Problems Maternal Grandmother     No Known Problems Paternal Grandmother     Throat cancer Maternal Grandfather     No Known Problems Paternal Grandfather     Mental illness Neg Hx     Colon cancer Neg Hx     Colon polyps Neg Hx        Meds/Allergies       Current Outpatient Medications:     estradiol (ESTRACE) 0 5 MG tablet    famotidine (PEPCID) 20 mg tablet    levothyroxine 125 mcg tablet    Lutein 20 MG CAPS    Multiple Vitamins-Minerals (MULTIVITAMIN GUMMIES WOMENS) CHEW    omeprazole (PriLOSEC) 40 MG capsule    verapamil (CALAN-SR) 120 mg CR tablet    VITAMIN D, ERGOCALCIFEROL, PO    famotidine (PEPCID) 40 MG tablet    ibuprofen (MOTRIN) 200 mg tablet    ZOLMitriptan (ZOMIG-ZMT) 2 5 MG disintegrating tablet    Current Facility-Administered Medications:     sodium chloride 0 9 % infusion, 50 mL/hr, Intravenous, Continuous, 50 mL/hr at 06/11/21 1127    Allergies   Allergen Reactions    Latex Rash and Hives    Iv Contrast [Iodinated Diagnostic Agents]     Other      Other reaction(s): hair dye causes rash  Orange coating    Lansoprazole Rash       Objective     /74   Pulse 73   Temp 98 7 °F (37 1 °C) (Temporal)   Resp (!) 29   LMP  (LMP Unknown)   SpO2 97%     PHYSICAL EXAM    Gen: NAD AAOx3  Head: Normocephalic, Atraumatic  CV: S1S2 RRR no m/r/g  CHEST: Clear b/l no c/r/w  ABD: soft, +BS NT/ND  EXT: no edema    ASSESSMENT/PLAN:  This is a 54y o  year old female here for surveillance colonoscopy secondary to personal history of polyps, and she is stable and optimized for her procedure

## 2021-06-11 NOTE — ANESTHESIA POSTPROCEDURE EVALUATION
Post-Op Assessment Note    CV Status:  Stable  Pain Score: 0    Pain management: adequate     Mental Status:  Alert and awake   Hydration Status:  Euvolemic and stable   PONV Controlled:  None   Airway Patency:  Patent      Post Op Vitals Reviewed: Yes      Staff: CRNA         No complications documented      /70 (06/11/21 1211)    Temp      Pulse 68 (06/11/21 1211)   Resp 13 (06/11/21 1211)    SpO2 94 % (06/11/21 1211)

## 2021-06-11 NOTE — ANESTHESIA PREPROCEDURE EVALUATION
Procedure:  COLONOSCOPY    Relevant Problems   CARDIO   (+) Chest pain   (+) Supraventricular tachycardia (HCC)      ENDO   (+) Hypothyroidism   (+) Hypothyroidism due to Hashimoto's thyroiditis      GI/HEPATIC   (+) GERD without esophagitis      HEMATOLOGY   (+) 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)      NEURO/PSYCH   (+) History of colonic polyps   3/21  NORMAL STRESS ECHO done to R/O Cardiac cause of Severe Heartburn    S/P radiation for lymphoma 7/20  Physical Exam    Airway    Mallampati score: I  TM Distance: >3 FB  Neck ROM: full     Dental   No notable dental hx     Cardiovascular      Pulmonary      Other Findings        Anesthesia Plan  ASA Score- 3     Anesthesia Type- IV sedation with anesthesia with ASA Monitors  Additional Monitors:   Airway Plan:           Plan Factors-Exercise tolerance (METS): >4 METS  Chart reviewed  Patient is not a current smoker  Patient not instructed to abstain from smoking on day of procedure  Patient did not smoke on day of surgery  Induction- intravenous  Postoperative Plan-     Informed Consent- Anesthetic plan and risks discussed with patient

## 2021-07-06 DIAGNOSIS — E06.3 HYPOTHYROIDISM DUE TO HASHIMOTO'S THYROIDITIS: ICD-10-CM

## 2021-07-06 DIAGNOSIS — E03.8 HYPOTHYROIDISM DUE TO HASHIMOTO'S THYROIDITIS: ICD-10-CM

## 2021-07-06 RX ORDER — LEVOTHYROXINE SODIUM 0.12 MG/1
125 TABLET ORAL DAILY
Qty: 90 TABLET | Refills: 3 | Status: SHIPPED | OUTPATIENT
Start: 2021-07-06 | End: 2022-01-18 | Stop reason: SDUPTHER

## 2021-07-06 NOTE — TELEPHONE ENCOUNTER
Patient needs a refill of her Levothyroxine please  She saw you last and has a follow up in January

## 2021-07-30 DIAGNOSIS — K21.9 GERD WITHOUT ESOPHAGITIS: Primary | ICD-10-CM

## 2021-07-30 RX ORDER — OMEPRAZOLE 20 MG/1
20 CAPSULE, DELAYED RELEASE ORAL DAILY
Qty: 90 CAPSULE | Refills: 3 | Status: SHIPPED | OUTPATIENT
Start: 2021-07-30 | End: 2021-10-26

## 2021-07-30 NOTE — TELEPHONE ENCOUNTER
Pt states she need to refill Omeprazole 20, once daily, to [facility], requests 90-day supply  Also, states we have her prescribed to take Pepcid twice daily, but at recent procedure discussed w/ Dr that she only takes once daily at night-doesn't need any of that because she has excess from [employer]  If ques CB # [id number]

## 2021-07-30 NOTE — TELEPHONE ENCOUNTER
Last OV  10/30/2020      Recommended F/U   Last refill   7/27/2020    Qty 180  - but that was for omeprazole 40 mg not 20 mg  Last colonoscopy 6/11/2021      Called patient and confirmed omeprazole 20 mg, not 40 mg as per discussion she had with provider after colonoscopy  She takes omeprazole in morning and famotidine at bedtime and hopes to eventually get off the meds if she could

## 2021-09-07 ENCOUNTER — OFFICE VISIT (OUTPATIENT)
Dept: FAMILY MEDICINE CLINIC | Facility: HOSPITAL | Age: 56
End: 2021-09-07
Payer: COMMERCIAL

## 2021-09-07 VITALS
BODY MASS INDEX: 32.49 KG/M2 | HEIGHT: 65 IN | HEART RATE: 73 BPM | OXYGEN SATURATION: 98 % | WEIGHT: 195 LBS | SYSTOLIC BLOOD PRESSURE: 110 MMHG | DIASTOLIC BLOOD PRESSURE: 78 MMHG

## 2021-09-07 DIAGNOSIS — K21.9 GERD WITHOUT ESOPHAGITIS: ICD-10-CM

## 2021-09-07 DIAGNOSIS — M25.551 RIGHT HIP PAIN: ICD-10-CM

## 2021-09-07 DIAGNOSIS — Q99.8: ICD-10-CM

## 2021-09-07 DIAGNOSIS — Z00.00 ANNUAL PHYSICAL EXAM: Primary | ICD-10-CM

## 2021-09-07 DIAGNOSIS — E06.3 HYPOTHYROIDISM DUE TO HASHIMOTO'S THYROIDITIS: ICD-10-CM

## 2021-09-07 DIAGNOSIS — I47.1 PAROXYSMAL SVT (SUPRAVENTRICULAR TACHYCARDIA) (HCC): ICD-10-CM

## 2021-09-07 DIAGNOSIS — E03.8 HYPOTHYROIDISM DUE TO HASHIMOTO'S THYROIDITIS: ICD-10-CM

## 2021-09-07 DIAGNOSIS — C82.31 GRADE 3A FOLLICULAR LYMPHOMA OF LYMPH NODES OF NECK (HCC): ICD-10-CM

## 2021-09-07 DIAGNOSIS — Z90.710 S/P TAH (TOTAL ABDOMINAL HYSTERECTOMY): ICD-10-CM

## 2021-09-07 DIAGNOSIS — H35.52 RETINITIS PIGMENTOSA: ICD-10-CM

## 2021-09-07 DIAGNOSIS — Z23 NEED FOR SHINGLES VACCINE: ICD-10-CM

## 2021-09-07 DIAGNOSIS — G43.009 MIGRAINE WITHOUT AURA AND WITHOUT STATUS MIGRAINOSUS, NOT INTRACTABLE: ICD-10-CM

## 2021-09-07 DIAGNOSIS — E55.9 VITAMIN D DEFICIENCY: ICD-10-CM

## 2021-09-07 PROBLEM — K21.00 GASTRO-ESOPHAGEAL REFLUX DISEASE WITH ESOPHAGITIS: Status: ACTIVE | Noted: 2020-10-29

## 2021-09-07 PROBLEM — G43.909 MIGRAINE WITHOUT STATUS MIGRAINOSUS, NOT INTRACTABLE: Status: RESOLVED | Noted: 2020-05-01 | Resolved: 2021-09-07

## 2021-09-07 PROBLEM — R07.9 CHEST PAIN: Status: RESOLVED | Noted: 2019-12-09 | Resolved: 2021-09-07

## 2021-09-07 PROCEDURE — 1036F TOBACCO NON-USER: CPT | Performed by: INTERNAL MEDICINE

## 2021-09-07 PROCEDURE — 99396 PREV VISIT EST AGE 40-64: CPT | Performed by: INTERNAL MEDICINE

## 2021-09-07 PROCEDURE — 3008F BODY MASS INDEX DOCD: CPT | Performed by: INTERNAL MEDICINE

## 2021-09-07 PROCEDURE — 3725F SCREEN DEPRESSION PERFORMED: CPT | Performed by: INTERNAL MEDICINE

## 2021-09-07 RX ORDER — UREA 10 %
1 LOTION (ML) TOPICAL
COMMUNITY
End: 2022-01-18

## 2021-09-07 RX ORDER — ZOSTER VACCINE RECOMBINANT, ADJUVANTED 50 MCG/0.5
0.5 KIT INTRAMUSCULAR ONCE
Qty: 1 EACH | Refills: 1 | Status: SHIPPED | OUTPATIENT
Start: 2021-09-07 | End: 2021-09-07

## 2021-09-07 NOTE — ASSESSMENT & PLAN NOTE
Finished radiation in July 2020- head and neck  Seeing team at [facility] last Friday-   had 3rd shot with  covid booster- felt sick for 2 days with fevers etc

## 2021-09-07 NOTE — PROGRESS NOTES
[address] [facility] PRIMARY CARE [address]    NAME: Sadaf Shaw  AGE: 54 y o  SEX: female  : 1965     DATE: 2021     Assessment and Plan:     Problem List Items Addressed This Visit        Digestive    GERD without esophagitis       Endocrine    Hypothyroidism due to Hashimoto's thyroiditis       Cardiovascular and Mediastinum    Migraine without aura and without status migrainosus, not intractable     Has headache in middle of night daily- feels better after getting up and movement- iuses zomig if having migraine- has right sided symptoms  If  Other muscle tension related uses ibupfropfen  Will refer to migraine team with  Sera Aguilar neurology            Immune and Lymphatic    Grade 3a follicular lymphoma of lymph nodes of neck ([address] )     Finished radiation in 2020- head and neck  Seeing team at [address] last Friday-   had 3rd shot with  covid booster- felt sick for 2 days with fevers etc               Other    Retinitis pigmentosa      Other Visit Diagnoses     Annual physical exam    -  Primary          Immunizations and preventive care screenings were discussed with patient today  Appropriate education was printed on patient's after visit summary  Counseling:  · Exercise: the importance of regular exercise/physical activity was discussed  Recommend exercise 3-5 times per week for at least 30 minutes  BMI Counseling: Body mass index is 32 45 kg/m²  The BMI is above normal  Nutrition recommendations include encouraging healthy choices of fruits and vegetables  Exercise recommendations include exercising 3-5 times per week  Walking  Dog 2x day          No follow-ups on file  Chief Complaint:     Chief Complaint   Patient presents with    Annual Exam      History of Present Illness:     Adult Annual Physical   Patient here for a comprehensive physical exam  The patient reports no problems     had stress echo in May- had sob with exertion on steps to go into work- had daily chest burning    Diet and Physical Activity  · Diet/Nutrition: poor diet  · Exercise: walking  Depression Screening  PHQ-9 Depression Screening    PHQ-9:   Frequency of the following problems over the past two weeks:      Little interest or pleasure in doing things: 0 - not at all  Feeling down, depressed, or hopeless: 0 - not at all  PHQ-2 Score: 0       General Health  · Sleep: gets 7-8 hours of sleep on average  · Hearing: normal - bilateral   · Vision: vision problems: 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 and retinitis pigmentoss  · Dental: regular dental visits  /GYN Health  · Patient is: postmenopausal  ·  had total hysterectomy     Review of Systems:     Review of Systems   Constitutional: Negative for chills and fever  All other systems reviewed and are negative       Past Medical History:     Past Medical History:   Diagnosis Date    Cancer ([facility] )     non-hogikins lymphoma     Convergence insufficiency     Disease of thyroid gland     Endometrial hyperplasia     Enhanced S-cone syndrome     Follicular lymphoma ([facility] ) 04/2020    GERD (gastroesophageal reflux disease)     Irregular heart beat     SVT    Non Hodgkin's lymphoma ([facility] )     Overweight     Retinitis pigmentosa       Past Surgical History:     Past Surgical History:   Procedure Laterality Date    ANAL SPHINCTEROPLASTY      ANAL/SPHINCTEROPLASTY      ANTERIOR AND POSTERIOR VAGINAL REPAIR      BLADDER SUSPENSION      DILATION AND CURETTAGE OF UTERUS      X 2    HYSTERECTOMY  2014    OOPHORECTOMY Bilateral 2014    with hysterectomy    TONSILLECTOMY AND ADENOIDECTOMY      US GUIDED LYMPH NODE BIOPSY LEFT  4/21/2020      Social History:     Social History     Socioeconomic History    Marital status: /Civil Union     Spouse name: None    Number of children: None    Years of education: None    Highest education level: None   Occupational History    Occupation: RN   Tobacco Use    Smoking status: Never Smoker    Smokeless tobacco: Never Used   Vaping Use    Vaping Use: Never used   Substance and Sexual Activity    Alcohol use: Yes     Comment: rare    Drug use: Yes     Types: Marijuana     Comment: help to sleep    Sexual activity: None   Other Topics Concern    None   Social History Narrative    Dental care, regularly    Lives with spouse    Living situation: Supportive and safe    No alcohol use - As per [employer]    Social drinker - As per [employer]      Social Determinants of Health     Financial Resource Strain:     Difficulty of Paying Living Expenses:    Food Insecurity:     Worried About Running Out of Food in the Last Year:     [address] in the Last Year:    Transportation Needs:     Lack of Transportation (Medical):      Lack of Transportation (Non-Medical):    Physical Activity:     Days of Exercise per Week:     Minutes of Exercise per Session:    Stress:     Feeling of Stress :    Social Connections:     Frequency of Communication with Friends and Family:     Frequency of Social Gatherings with Friends and Family:     Attends Muslim Services:     Active Member of Clubs or Organizations:     Attends Club or Organization Meetings:     Marital Status:    Intimate Partner Violence:     Fear of Current or Ex-Partner:     Emotionally Abused:     Physically Abused:     Sexually Abused:       Family History:     Family History   Problem Relation Age of Onset    Cancer Mother         lung    COPD Father     Diabetes type II Brother         on insulin    Substance Abuse Sister    [facility] HIV Sister     No Known Problems Daughter     Other Family         Aortic valve replacement     No Known Problems Maternal Grandmother     No Known Problems Paternal Grandmother     Throat cancer Maternal Grandfather     No Known Problems Paternal Grandfather     Mental illness Neg Hx     Colon cancer Neg Hx     Colon polyps Neg Hx Current Medications:     Current Outpatient Medications   Medication Sig Dispense Refill    calcium carbonate (OS-JONATHAN) 1250 (500 Ca) MG chewable tablet Chew 1 tablet      estradiol (ESTRACE) 0 5 MG tablet Take 0 5 mg by mouth daily       famotidine (PEPCID) 20 mg tablet TAKE 1 TABLET BY MOUTH TWICE A  tablet 1    ibuprofen (MOTRIN) 200 mg tablet Take 600 mg by mouth every 6 (six) hours as needed for mild pain      levothyroxine 125 mcg tablet Take 1 tablet (125 mcg total) by mouth daily 90 tablet 3    Lutein 20 MG CAPS Take by mouth daily      MELATONIN PO Take 10 mg by mouth daily as needed      Multiple Vitamins-Minerals (MULTIVITAMIN GUMMIES WOMENS) CHEW Chew 2 each daily      omeprazole (PriLOSEC) 20 mg delayed release capsule Take 1 capsule (20 mg total) by mouth daily 90 capsule 3    verapamil (CALAN-SR) 120 mg CR tablet Take 120 mg by mouth      VITAMIN D, ERGOCALCIFEROL, PO Take 5,000 Units by mouth       ZOLMitriptan (ZOMIG-ZMT) 2 5 MG disintegrating tablet Take 1 tablet (2 5 mg total) by mouth daily as needed for migraine 18 tablet 3    famotidine (PEPCID) 40 MG tablet Take 1 tablet (40 mg total) by mouth daily at bedtime (Patient not taking: Reported on 9/7/2021) 30 tablet 2     No current facility-administered medications for this visit  Allergies: Allergies   Allergen Reactions    Latex Rash and Hives    Iodinated Diagnostic Agents Sneezing     Sneezing, tongue tingling  Sneezing, tongue tingling      Other      Other reaction(s): hair dye causes rash  Orange coating    Lansoprazole Rash      Physical Exam:     /78   Pulse 73   Ht 5' 5" (1 651 m)   Wt 88 5 kg (195 lb)   LMP  (LMP Unknown)   SpO2 98%   BMI 32 45 kg/m²     Physical Exam  Vitals and nursing note reviewed  Constitutional:       General: She is not in acute distress  Appearance: She is not toxic-appearing  HENT:      Head: Normocephalic        Right Ear: Tympanic membrane normal  Left Ear: Tympanic membrane normal       Nose:      Comments: Right  Nares with septal erosion- no bleeding currently- bilateral turbinate swelling     Mouth/Throat:      Pharynx: No oropharyngeal exudate  Eyes:      General:         Right eye: No discharge  Extraocular Movements: Extraocular movements intact  Pupils: Pupils are equal, round, and reactive to light  Cardiovascular:      Rate and Rhythm: Regular rhythm  Heart sounds: No murmur heard  Pulmonary:      Breath sounds: No wheezing or rhonchi  Abdominal:      General: Abdomen is flat  There is no distension  Palpations: Abdomen is soft  Tenderness: There is no abdominal tenderness  Musculoskeletal:         General: Tenderness present  No swelling  Cervical back: No rigidity or tenderness  Comments: Right hip inguinal tenderness   Skin:     Coloration: Skin is not jaundiced  Findings: No erythema  Neurological:      General: No focal deficit present  Mental Status: She is alert  Psychiatric:         Mood and Affect: Mood normal          Thought Content:  Thought content normal           Marti Lema DO  [address] [place]

## 2021-09-07 NOTE — PATIENT INSTRUCTIONS

## 2021-09-07 NOTE — ASSESSMENT & PLAN NOTE
Has headache in middle of night daily- feels better after getting up and movement- iuses zomig if having migraine- has right sided symptoms   If  Other muscle tension related uses ibupfropfen  Will refer to migraine team with  Ike Parson neurology

## 2021-09-20 ENCOUNTER — TELEPHONE (OUTPATIENT)
Dept: FAMILY MEDICINE CLINIC | Facility: HOSPITAL | Age: 56
End: 2021-09-20

## 2021-09-20 DIAGNOSIS — G43.009 MIGRAINE WITHOUT AURA AND WITHOUT STATUS MIGRAINOSUS, NOT INTRACTABLE: ICD-10-CM

## 2021-09-20 RX ORDER — ZOLMITRIPTAN 2.5 MG/1
2.5 TABLET, ORALLY DISINTEGRATING ORAL DAILY PRN
Qty: 18 TABLET | Refills: 3 | Status: SHIPPED | OUTPATIENT
Start: 2021-09-20 | End: 2021-12-03 | Stop reason: SDUPTHER

## 2021-09-21 ENCOUNTER — HOSPITAL ENCOUNTER (OUTPATIENT)
Dept: RADIOLOGY | Facility: HOSPITAL | Age: 56
Discharge: HOME | End: 2021-09-21
Attending: INTERNAL MEDICINE
Payer: COMMERCIAL

## 2021-09-21 DIAGNOSIS — M25.551 RIGHT HIP PAIN: ICD-10-CM

## 2021-09-21 PROCEDURE — 73502 X-RAY EXAM HIP UNI 2-3 VIEWS: CPT

## 2021-09-22 LAB
25(OH)D3+25(OH)D2 SERPL-MCNC: 46.5 NG/ML (ref 30–100)
ALBUMIN SERPL-MCNC: 4.2 G/DL (ref 3.8–4.9)
ALBUMIN/GLOB SERPL: 1.8 {RATIO} (ref 1.2–2.2)
ALP SERPL-CCNC: 81 IU/L (ref 44–121)
ALT SERPL-CCNC: 27 IU/L (ref 0–32)
AST SERPL-CCNC: 29 IU/L (ref 0–40)
BILIRUB SERPL-MCNC: 0.7 MG/DL (ref 0–1.2)
BUN SERPL-MCNC: 12 MG/DL (ref 6–24)
BUN/CREAT SERPL: 14 (ref 9–23)
CALCIUM SERPL-MCNC: 9.3 MG/DL (ref 8.7–10.2)
CHLORIDE SERPL-SCNC: 103 MMOL/L (ref 96–106)
CHOLEST SERPL-MCNC: 208 MG/DL (ref 100–199)
CO2 SERPL-SCNC: 27 MMOL/L (ref 20–29)
CREAT SERPL-MCNC: 0.86 MG/DL (ref 0.57–1)
GLOBULIN SER-MCNC: 2.3 G/DL (ref 1.5–4.5)
GLUCOSE SERPL-MCNC: 102 MG/DL (ref 65–99)
HDLC SERPL-MCNC: 45 MG/DL
LDH SERPL-CCNC: 158 IU/L (ref 119–226)
LDLC SERPL CALC-MCNC: 137 MG/DL (ref 0–99)
LDLC/HDLC SERPL: 3 RATIO (ref 0–3.2)
POTASSIUM SERPL-SCNC: 4.1 MMOL/L (ref 3.5–5.2)
PROT SERPL-MCNC: 6.5 G/DL (ref 6–8.5)
SL AMB EGFR AFRICAN AMERICAN: 88 ML/MIN/1.73
SL AMB EGFR NON AFRICAN AMERICAN: 76 ML/MIN/1.73
SL AMB VLDL CHOLESTEROL CALC: 26 MG/DL (ref 5–40)
SODIUM SERPL-SCNC: 141 MMOL/L (ref 134–144)
TRIGL SERPL-MCNC: 147 MG/DL (ref 0–149)
TSH SERPL DL<=0.005 MIU/L-ACNC: 0.58 UIU/ML (ref 0.45–4.5)

## 2021-10-05 ENCOUNTER — IMMUNIZATIONS (OUTPATIENT)
Dept: FAMILY MEDICINE CLINIC | Facility: HOSPITAL | Age: 56
End: 2021-10-05
Payer: COMMERCIAL

## 2021-10-05 DIAGNOSIS — Z23 ENCOUNTER FOR IMMUNIZATION: Primary | ICD-10-CM

## 2021-10-05 PROCEDURE — 90471 IMMUNIZATION ADMIN: CPT

## 2021-10-05 PROCEDURE — 90682 RIV4 VACC RECOMBINANT DNA IM: CPT

## 2021-10-25 DIAGNOSIS — K21.9 GERD WITHOUT ESOPHAGITIS: ICD-10-CM

## 2021-10-26 RX ORDER — OMEPRAZOLE 20 MG/1
CAPSULE, DELAYED RELEASE ORAL
Qty: 90 CAPSULE | Refills: 3 | Status: SHIPPED | OUTPATIENT
Start: 2021-10-26 | End: 2021-11-01 | Stop reason: SDUPTHER

## 2021-11-01 DIAGNOSIS — K21.9 GERD WITHOUT ESOPHAGITIS: ICD-10-CM

## 2021-11-01 RX ORDER — OMEPRAZOLE 20 MG/1
20 CAPSULE, DELAYED RELEASE ORAL DAILY
Qty: 180 CAPSULE | Refills: 1 | Status: SHIPPED | OUTPATIENT
Start: 2021-11-01 | End: 2022-07-19

## 2021-12-03 DIAGNOSIS — G43.009 MIGRAINE WITHOUT AURA AND WITHOUT STATUS MIGRAINOSUS, NOT INTRACTABLE: ICD-10-CM

## 2021-12-03 RX ORDER — ZOLMITRIPTAN 2.5 MG/1
2.5 TABLET, ORALLY DISINTEGRATING ORAL DAILY PRN
Qty: 18 TABLET | Refills: 3 | Status: SHIPPED | OUTPATIENT
Start: 2021-12-03 | End: 2022-06-16 | Stop reason: SDUPTHER

## 2021-12-08 ENCOUNTER — TELEPHONE (OUTPATIENT)
Dept: ENDOCRINOLOGY | Facility: HOSPITAL | Age: 56
End: 2021-12-08

## 2021-12-09 DIAGNOSIS — E06.3 HYPOTHYROIDISM DUE TO HASHIMOTO'S THYROIDITIS: Primary | ICD-10-CM

## 2021-12-09 DIAGNOSIS — E03.8 HYPOTHYROIDISM DUE TO HASHIMOTO'S THYROIDITIS: Primary | ICD-10-CM

## 2022-01-06 ENCOUNTER — TELEPHONE (OUTPATIENT)
Dept: FAMILY MEDICINE CLINIC | Facility: HOSPITAL | Age: 57
End: 2022-01-06

## 2022-01-06 DIAGNOSIS — U07.1 COVID: Primary | ICD-10-CM

## 2022-01-06 PROCEDURE — U0005 INFEC AGEN DETEC AMPLI PROBE: HCPCS | Performed by: INTERNAL MEDICINE

## 2022-01-06 PROCEDURE — U0003 INFECTIOUS AGENT DETECTION BY NUCLEIC ACID (DNA OR RNA); SEVERE ACUTE RESPIRATORY SYNDROME CORONAVIRUS 2 (SARS-COV-2) (CORONAVIRUS DISEASE [COVID-19]), AMPLIFIED PROBE TECHNIQUE, MAKING USE OF HIGH THROUGHPUT TECHNOLOGIES AS DESCRIBED BY CMS-2020-01-R: HCPCS | Performed by: INTERNAL MEDICINE

## 2022-01-06 NOTE — TELEPHONE ENCOUNTER
Covid test ordered but patient is asking what she can do to bring her temp down  She has been alternating with Tylenol and Advil with no relief  Please advise

## 2022-01-10 ENCOUNTER — TELEMEDICINE (OUTPATIENT)
Dept: FAMILY MEDICINE CLINIC | Facility: HOSPITAL | Age: 57
End: 2022-01-10
Payer: COMMERCIAL

## 2022-01-10 VITALS — BODY MASS INDEX: 32.49 KG/M2 | TEMPERATURE: 100.3 F | HEIGHT: 65 IN | WEIGHT: 195 LBS

## 2022-01-10 DIAGNOSIS — C82.31 GRADE 3A FOLLICULAR LYMPHOMA OF LYMPH NODES OF NECK (HCC): ICD-10-CM

## 2022-01-10 DIAGNOSIS — U07.1 COVID-19 VIRUS INFECTION: Primary | ICD-10-CM

## 2022-01-10 PROCEDURE — 99214 OFFICE O/P EST MOD 30 MIN: CPT | Performed by: INTERNAL MEDICINE

## 2022-01-10 RX ORDER — BUDESONIDE 90 UG/1
4 AEROSOL, POWDER RESPIRATORY (INHALATION) 2 TIMES DAILY
Qty: 1 EACH | Refills: 1 | Status: SHIPPED | OUTPATIENT
Start: 2022-01-10 | End: 2022-01-18 | Stop reason: ALTCHOICE

## 2022-01-10 RX ORDER — BENZONATATE 100 MG/1
100 CAPSULE ORAL 3 TIMES DAILY
Qty: 30 CAPSULE | Refills: 0 | Status: SHIPPED | OUTPATIENT
Start: 2022-01-10 | End: 2022-05-27 | Stop reason: ALTCHOICE

## 2022-01-10 NOTE — PROGRESS NOTES
COVID-19 Outpatient Progress Note    Assessment/Plan:    Problem List Items Addressed This Visit        Immune and Lymphatic    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)     Pt is s/p lymphadenectomy and radiation therapy, she has not received chemotherapy           Other Visit Diagnoses     COVID-19 virus infection    -  Primary    Relevant Medications    budesonide (Pulmicort Flexhaler) 90 MCG/ACT inhaler    benzonatate (TESSALON PERLES) 100 mg capsule         Disposition:     I recommended self-quarantine for 10 days and to watch for symptoms until 14 days after exposure  If patient were to develop symptoms, they should self isolate and call our office for further guidance  I have spent 25 minutes directly with the patient  Greater than 50% of this time was spent in counseling/coordination of care regarding: prognosis, risks and benefits of treatment options and instructions for management  Encounter provider Jessica Cueto MD    Provider located at [address] [place] [phone]    Recent Visits  Date Type Provider Dept   01/06/22 Telephone [facility] [address]   Showing recent visits within past 7 days and meeting all other requirements  Today's Visits  Date Type Provider Dept   01/10/22 Julia Pabon MD [facility] [address]   Showing today's visits and meeting all other requirements  Future Appointments  No visits were found meeting these conditions  Showing future appointments within next 150 days and meeting all other requirements     This virtual check-in was done via [employer] and patient was informed that this is a secure, HIPAA-compliant platform  She agrees to proceed      Patient agrees to participate in a virtual check in via telephone or video visit instead of presenting to the office to address urgent/immediate medical needs  Patient is aware this is a billable service  After connecting through [facility], the patient was identified by name and date of birth  Castro Samuel was informed that this was a telemedicine visit and that the exam was being conducted confidentially over secure lines  Castro Samuel acknowledged consent and understanding of privacy and security of the telemedicine visit  I informed the patient that I have reviewed her record in Epic and presented the opportunity for her to ask any questions regarding the visit today  The patient agreed to participate  Verification of patient location:  Patient is located in the following state in which I hold an active license: [place]    Subjective: Castro Samuel is a 64 y o  female who is concerned about COVID-19  Patient's symptoms include fever, malaise, nasal congestion (with postnasal drip), rhinorrhea, cough and headache  Patient denies nausea, vomiting and diarrhea  Shortness of breath: O2 saturation remains above 94%!      - Date of symptom onset: 1/4/2022      COVID-19 vaccination status: Fully vaccinated with booster    Exposure:   Contact with a person who is under investigation (PUI) for or who is positive for COVID-19 within the last 14 days?: Yes    Lab Results   Component Value Date    SARSCOV2 Positive (A) 01/06/2022    SARSCOV2 Negative 05/01/2020     Past Medical History:   Diagnosis Date    Cancer ([address] )     non-hogikins lymphoma     Convergence insufficiency     Disease of thyroid gland     Endometrial hyperplasia     Enhanced S-cone syndrome     Follicular lymphoma ([address] ) 04/2020    GERD (gastroesophageal reflux disease)     Irregular heart beat     SVT    Non Hodgkin's lymphoma ([address] )     Overweight     Retinitis pigmentosa      Past Surgical History:   Procedure Laterality Date    ANAL SPHINCTEROPLASTY      ANAL/SPHINCTEROPLASTY      ANTERIOR AND POSTERIOR VAGINAL REPAIR      BLADDER SUSPENSION      DILATION AND CURETTAGE OF UTERUS X 2    HYSTERECTOMY  2014    OOPHORECTOMY Bilateral 2014    with hysterectomy    TONSILLECTOMY AND ADENOIDECTOMY      US GUIDED LYMPH NODE BIOPSY LEFT  4/21/2020     Current Outpatient Medications   Medication Sig Dispense Refill    estradiol (ESTRACE) 0 5 MG tablet Take 0 5 mg by mouth daily       famotidine (PEPCID) 20 mg tablet TAKE 1 TABLET BY MOUTH TWICE A DAY (Patient taking differently: 1 at bedtime ) 180 tablet 1    ibuprofen (MOTRIN) 200 mg tablet Take 600 mg by mouth every 6 (six) hours as needed for mild pain      levothyroxine 125 mcg tablet Take 1 tablet (125 mcg total) by mouth daily 90 tablet 3    Lutein 20 MG CAPS Take by mouth daily      MELATONIN PO Take 10 mg by mouth daily as needed      Multiple Vitamins-Minerals (MULTIVITAMIN GUMMIES WOMENS) CHEW Chew 2 each daily      omeprazole (PriLOSEC) 20 mg delayed release capsule Take 1 capsule (20 mg total) by mouth daily 180 capsule 1    verapamil (CALAN-SR) 120 mg CR tablet Take 120 mg by mouth      VITAMIN D, ERGOCALCIFEROL, PO Take 5,000 Units by mouth       ZOLMitriptan (ZOMIG-ZMT) 2 5 MG disintegrating tablet Take 1 tablet (2 5 mg total) by mouth daily as needed for migraine 18 tablet 3    benzonatate (TESSALON PERLES) 100 mg capsule Take 1 capsule (100 mg total) by mouth 3 (three) times a day 30 capsule 0    budesonide (Pulmicort Flexhaler) 90 MCG/ACT inhaler Inhale 4 puffs 2 (two) times a day Rinse mouth after use  1 each 1    calcium carbonate (OS-JONATHAN) 1250 (500 Ca) MG chewable tablet Chew 1 tablet (Patient not taking: Reported on 1/10/2022 )      famotidine (PEPCID) 40 MG tablet Take 1 tablet (40 mg total) by mouth daily at bedtime (Patient not taking: Reported on 1/10/2022 ) 30 tablet 2     No current facility-administered medications for this visit       Allergies   Allergen Reactions    Latex Rash and Hives    Iodinated Diagnostic Agents Sneezing     Sneezing, tongue tingling  Sneezing, tongue tingling      Other Other reaction(s): hair dye causes rash  Orange coating    Lansoprazole Rash       Review of Systems   Constitutional: Positive for fever  HENT: Positive for congestion (with postnasal drip) and rhinorrhea  Respiratory: Positive for cough  Shortness of breath: O2 saturation remains above 94%! Gastrointestinal: Negative for diarrhea, nausea and vomiting  Neurological: Positive for headaches  Objective:    Vitals:    01/10/22 0902   Temp: 100 3 °F (37 9 °C)   TempSrc: Oral   Weight: 88 5 kg (195 lb)   Height: 5' 5" (1 651 m)       Physical Exam  Constitutional:       General: She is not in acute distress  Appearance: She is ill-appearing  HENT:      Head: Normocephalic  Eyes:      Conjunctiva/sclera: Conjunctivae normal    Pulmonary:      Effort: Pulmonary effort is normal  No respiratory distress  Neurological:      Mental Status: She is alert and oriented to person, place, and time  Cranial Nerves: No cranial nerve deficit  Motor: No weakness  VIRTUAL VISIT DISCLAIMER    Tonya Box verbally agrees to participate in [employer]  Pt is aware that [employer] could be limited without vital signs or the ability to perform a full hands-on physical Lesa Smart understands she or the provider may request at any time to terminate the video visit and request the patient to seek care or treatment in person

## 2022-01-10 NOTE — LETTER
January 10, 2022     Patient: Kang Wiley   YOB: 1965   Date of Visit: 1/10/2022       To Whom it May Concern:    Leslie Caruso is under my professional care  She was seen in my office on 1/10/2022 on virtual video visit  She may return to work on 01/17/2022  If you have any questions or concerns, please don't hesitate to call  Sincerely,          Sendy Pascual MD        CC: Diana Fraire

## 2022-01-12 ENCOUNTER — TELEPHONE (OUTPATIENT)
Dept: FAMILY MEDICINE CLINIC | Facility: HOSPITAL | Age: 57
End: 2022-01-12

## 2022-01-12 DIAGNOSIS — U07.1 COVID: Primary | ICD-10-CM

## 2022-01-12 RX ORDER — PROMETHAZINE HYDROCHLORIDE AND CODEINE PHOSPHATE 6.25; 1 MG/5ML; MG/5ML
5 SYRUP ORAL EVERY 4 HOURS PRN
Qty: 120 ML | Refills: 0 | Status: SHIPPED | OUTPATIENT
Start: 2022-01-12 | End: 2022-05-27 | Stop reason: ALTCHOICE

## 2022-01-18 ENCOUNTER — OFFICE VISIT (OUTPATIENT)
Dept: ENDOCRINOLOGY | Facility: HOSPITAL | Age: 57
End: 2022-01-18
Payer: COMMERCIAL

## 2022-01-18 VITALS
HEIGHT: 65 IN | WEIGHT: 196 LBS | BODY MASS INDEX: 32.65 KG/M2 | HEART RATE: 90 BPM | OXYGEN SATURATION: 93 % | SYSTOLIC BLOOD PRESSURE: 112 MMHG | DIASTOLIC BLOOD PRESSURE: 70 MMHG

## 2022-01-18 DIAGNOSIS — E03.8 HYPOTHYROIDISM DUE TO HASHIMOTO'S THYROIDITIS: Primary | ICD-10-CM

## 2022-01-18 DIAGNOSIS — E04.9 GOITER: ICD-10-CM

## 2022-01-18 DIAGNOSIS — E83.52 HYPERCALCEMIA: ICD-10-CM

## 2022-01-18 DIAGNOSIS — Z92.3 HISTORY OF RADIATION TO HEAD AND NECK REGION: ICD-10-CM

## 2022-01-18 DIAGNOSIS — E06.3 HYPOTHYROIDISM DUE TO HASHIMOTO'S THYROIDITIS: Primary | ICD-10-CM

## 2022-01-18 PROCEDURE — 99214 OFFICE O/P EST MOD 30 MIN: CPT | Performed by: INTERNAL MEDICINE

## 2022-01-18 PROCEDURE — 3008F BODY MASS INDEX DOCD: CPT | Performed by: INTERNAL MEDICINE

## 2022-01-18 PROCEDURE — 1036F TOBACCO NON-USER: CPT | Performed by: INTERNAL MEDICINE

## 2022-01-18 RX ORDER — LEVOTHYROXINE SODIUM 0.12 MG/1
125 TABLET ORAL DAILY
Qty: 90 TABLET | Refills: 3 | Status: SHIPPED | OUTPATIENT
Start: 2022-01-18

## 2022-01-18 NOTE — PATIENT INSTRUCTIONS
The thyroid blood work is elton  Continue the same levothyroxine 125 mcg daily  we'll follow the calcium  We'll recheck the thyroid ultrasound next year since you had neck radiation therapy

## 2022-01-18 NOTE — PROGRESS NOTES
1/18/2022    Assessment/Plan      Diagnoses and all orders for this visit:    Hypothyroidism due to Hashimoto's thyroiditis  -     TSH, 3rd generation Lab Collect; Future  -     T4, free Lab Collect; Future  -     Comprehensive metabolic panel Lab Collect; Future  -     PTH, intact Lab Collect Lab Collect; Future  -     TSH, 3rd generation Lab Collect  -     T4, free Lab Collect  -     Comprehensive metabolic panel Lab Collect  -     PTH, intact Lab Collect Lab Collect  -     levothyroxine 125 mcg tablet; Take 1 tablet (125 mcg total) by mouth daily  -     US thyroid; Future    Goiter  -     TSH, 3rd generation Lab Collect; Future  -     T4, free Lab Collect; Future  -     Comprehensive metabolic panel Lab Collect; Future  -     PTH, intact Lab Collect Lab Collect; Future  -     TSH, 3rd generation Lab Collect  -     T4, free Lab Collect  -     Comprehensive metabolic panel Lab Collect  -     PTH, intact Lab Collect Lab Collect  -     US thyroid; Future    Hypercalcemia  -     TSH, 3rd generation Lab Collect; Future  -     T4, free Lab Collect; Future  -     Comprehensive metabolic panel Lab Collect; Future  -     PTH, intact Lab Collect Lab Collect; Future  -     TSH, 3rd generation Lab Collect  -     T4, free Lab Collect  -     Comprehensive metabolic panel Lab Collect  -     PTH, intact Lab Collect Lab Collect    History of radiation to head and neck region  -     US thyroid; Future        Assessment/Plan:  1  Hypothyroidism due to Hashimoto's thyroiditis  Most recent thyroid function tests are normal   She is clinically and biochemically euthyroid  She will continue the same levothyroxine 125 mcg daily  2  Thyroid goiter  She has no compressive thyroid symptoms  She is at risk for thyroid nodules and thyroid cancer now that she has had radiation treatment to her neck  3  Hypercalcemia  Most recent calcium level is just mildly elevated but does correct to normal for her albumin of 4 6    I will follow these levels over time  4  History of radiation therapy  She has now had radiation therapy to her neck for lymphoma  This does increase her risk of hyperparathyroidism and thyroid cancer  I will follow thyroid ultrasound and calcium levels over time  I have asked her to follow up in 1 year with preceding TSH, free T4, CMP, intact parathyroid hormone level, and thyroid ultrasound  CC:  Hypothyroid, thyroid goiter, hypercalcemia follow-up    History of Present Illness     HPI: Garret Currie is a 64y o  year old female with history of hypothyroidism due to Hashimoto's thyroiditis with goiter for follow-up visit  She was diagnosed with Hashimoto's thyroiditis in 2003 and TSH was over 7, she was started on thyroid hormone for replacement purposes  She is currently taking levothyroxine 125 mcg daily  Weight is 3 lb less than last year  She denies heat or cold intolerance, palpitation, or tremors  She does have fatigue  She has insomnia  She denies diarrhea except with certain foods  She denies constipation, anxiety or depression  She has dry skin but no brittle nails or hair loss  She does tend to feel as if there is something in her throat and it is chronically sore and scratchy  No food gets stuck in her throat  She denies diplopia  She has a history of a thyroid goiter  Last thyroid ultrasound was 2017 showing a very mild goiter  No repeat thyroid ultrasounds need to be performed at this point unless her symptoms change  She had XRt to the left side of the neck for lymphoma treatment  Review of Systems   Constitutional: Positive for fatigue  Negative for unexpected weight change  Weight 3 lb less than last year  HENT: Positive for trouble swallowing  Always feels like something in throat and chronically sore ans scratchy, feels food gets stuck  Feels very dry in the posterior area  Eyes: Positive for visual disturbance  Wears glasses   Has blurry vision, no peripheral vision due to eye issue  Has chronic diplopia due to convergence issue  Respiratory: Negative for chest tightness and shortness of breath  Cardiovascular: Negative for chest pain and palpitations  Gastrointestinal: Positive for diarrhea  Negative for abdominal pain, constipation and nausea  Diarrhea with certain foods  Endocrine: Negative for cold intolerance and heat intolerance  Skin: Negative for rash  Has dry skin  No brittle nails  No hair loss  Neurological: Positive for numbness and headaches  Negative for dizziness, tremors and light-headedness  Numbness and tingling on the left side of neck and into the shoulder an ear  Can not lay on left side  Has nerve damage to the left side after surgery  Has chronic headaches  Psychiatric/Behavioral: Positive for sleep disturbance  Negative for dysphoric mood  The patient is not nervous/anxious  Insomnia  Has medical marijuana  Historical Information   Past Medical History:   Diagnosis Date    Cancer ([facility] )     non-hogikins lymphoma     Convergence insufficiency     bilateral eyes      COVID-19 01/2022    Disease of thyroid gland     Endometrial hyperplasia     Enhanced S-cone syndrome     eyes    Follicular lymphoma ([facility] ) 04/2020    GERD (gastroesophageal reflux disease)     Irregular heart beat     SVT    Non [facility]'s [facility] ([facility] ) 2020    stage 1, had surgery and XRT to the neck( finished july 2020)    Overweight     Retinitis pigmentosa      Past Surgical History:   Procedure Laterality Date    ANAL SPHINCTEROPLASTY      ANAL/SPHINCTEROPLASTY      ANTERIOR AND POSTERIOR VAGINAL REPAIR      BLADDER SUSPENSION      DILATION AND CURETTAGE OF UTERUS      X 2    HYSTERECTOMY  2014    OOPHORECTOMY Bilateral 2014    with hysterectomy    TONSILLECTOMY AND ADENOIDECTOMY      US GUIDED LYMPH NODE BIOPSY LEFT  4/21/2020     Social History   Social History     Substance and Sexual Activity   Alcohol Use Yes    Comment: rare     Social History     Substance and Sexual Activity   Drug Use Yes    Types: Marijuana    Comment: help to sleep, medical marijuana card     Social History     Tobacco Use   Smoking Status Never Smoker   Smokeless Tobacco Never Used     Family History:   Family History   Problem Relation Age of Onset    Cancer Mother         lung    COPD Father     Diabetes type II Brother         on insulin    Substance Abuse Sister     HIV Sister     No Known Problems Daughter     Other Family         Aortic valve replacement     No Known Problems Maternal Grandmother     No Known Problems Paternal Grandmother     Throat cancer Maternal Grandfather     No Known Problems Paternal Grandfather     Mental illness Neg Hx     Colon cancer Neg Hx     Colon polyps Neg Hx        Meds/Allergies   Current Outpatient Medications   Medication Sig Dispense Refill    benzonatate (TESSALON PERLES) 100 mg capsule Take 1 capsule (100 mg total) by mouth 3 (three) times a day 30 capsule 0    estradiol (ESTRACE) 0 5 MG tablet Take 0 5 mg by mouth daily       famotidine (PEPCID) 20 mg tablet TAKE 1 TABLET BY MOUTH TWICE A DAY (Patient taking differently: 1 at bedtime ) 180 tablet 1    ibuprofen (MOTRIN) 200 mg tablet Take 600 mg by mouth every 6 (six) hours as needed for mild pain      levothyroxine 125 mcg tablet Take 1 tablet (125 mcg total) by mouth daily 90 tablet 3    Lutein 20 MG CAPS Take by mouth daily      Multiple Vitamins-Minerals (MULTIVITAMIN GUMMIES WOMENS) CHEW Chew 2 each daily      omeprazole (PriLOSEC) 20 mg delayed release capsule Take 1 capsule (20 mg total) by mouth daily 180 capsule 1    promethazine-codeine (PHENERGAN WITH CODEINE) 6 25-10 mg/5 mL syrup Take 5 mL by mouth every 4 (four) hours as needed for cough 120 mL 0    verapamil (CALAN-SR) 120 mg CR tablet Take 120 mg by mouth      VITAMIN D, ERGOCALCIFEROL, PO Take 5,000 Units by mouth       ZOLMitriptan (ZOMIG-ZMT) 2 5 MG disintegrating tablet Take 1 tablet (2 5 mg total) by mouth daily as needed for migraine 18 tablet 3     No current facility-administered medications for this visit  Allergies   Allergen Reactions    Latex Rash and Hives    Iodinated Diagnostic Agents Sneezing     Sneezing, tongue tingling  Sneezing, tongue tingling      Other      Other reaction(s): hair dye causes rash  Orange coating    Lansoprazole Rash       Objective   Vitals: Blood pressure 112/70, pulse 90, height 5' 5" (1 651 m), weight 88 9 kg (196 lb), SpO2 93 %  Invasive Devices  Report    None                 Physical Exam  Vitals reviewed  Constitutional:       Appearance: Normal appearance  She is well-developed  HENT:      Head: Normocephalic and atraumatic  Eyes:      Extraocular Movements: Extraocular movements intact  Conjunctiva/sclera: Conjunctivae normal       Comments: No lid lag, stare, proptosis, or periorbital edema  Neck:      Thyroid: No thyromegaly  Vascular: No carotid bruit  Comments: Thyroid not markedly enlarged  No bruits over the thyroid gland  Known palpable thyroid nodules  Cardiovascular:      Rate and Rhythm: Normal rate and regular rhythm  Heart sounds: Normal heart sounds  No murmur heard  Pulmonary:      Effort: Pulmonary effort is normal       Breath sounds: Normal breath sounds  No wheezing  Abdominal:      Palpations: Abdomen is soft  Musculoskeletal:         General: No deformity  Normal range of motion  Cervical back: Normal range of motion and neck supple  Right lower leg: No edema  Left lower leg: No edema  Comments: No tremor of the outstretched hands  No spinous process tenderness  No CVA tenderness  Lymphadenopathy:      Cervical: No cervical adenopathy  Skin:     General: Skin is warm and dry  Findings: No rash  Neurological:      Mental Status: She is alert and oriented to person, place, and time  Deep Tendon Reflexes: Reflexes are normal and symmetric  Comments: Deep tendon reflexes normal          The history was obtained from the review of the chart and from the patient  Lab Results:   Blood work done on 12/14/2021 showed a CMP with a glucose of 93 fasting, calcium 10 1 with an albumin of 4 6, ALT 35, AST 38, but was otherwise normal   Corrected calcium is 9 62      Lab Results   Component Value Date    CREATININE 0 86 09/21/2021    CREATININE 0 80 06/26/2020    CREATININE 0 82 03/10/2017    BUN 12 09/21/2021     03/10/2017    K 4 1 09/21/2021     09/21/2021    CO2 27 09/21/2021       Lab Results   Component Value Date    CHOL 198 03/10/2017    HDL 45 09/21/2021    TRIG 147 09/21/2021       Lab Results   Component Value Date    ALT 27 09/21/2021    AST 29 09/21/2021       TSH is 1 84 with a free T3 of 365 and a free T4 of 0 9 which were all normal     CBC is normal       Future Appointments   Date Time Provider Cj Grangeri   3/15/2022 10:15 AM DO FRANK Dorantes  [facility]   1/24/2023 10:20 AM Sukhjinder Tejada MD [facility]

## 2022-05-10 ENCOUNTER — HOSPITAL ENCOUNTER (OUTPATIENT)
Dept: MAMMOGRAPHY | Facility: IMAGING CENTER | Age: 57
Discharge: HOME | End: 2022-05-10
Payer: COMMERCIAL

## 2022-05-10 VITALS — HEIGHT: 65 IN | WEIGHT: 188 LBS | BODY MASS INDEX: 31.32 KG/M2

## 2022-05-10 DIAGNOSIS — Z12.31 ENCOUNTER FOR SCREENING MAMMOGRAM FOR BREAST CANCER: ICD-10-CM

## 2022-05-10 PROCEDURE — 77067 SCR MAMMO BI INCL CAD: CPT

## 2022-05-10 PROCEDURE — 77063 BREAST TOMOSYNTHESIS BI: CPT

## 2022-05-27 ENCOUNTER — OFFICE VISIT (OUTPATIENT)
Dept: FAMILY MEDICINE CLINIC | Facility: HOSPITAL | Age: 57
End: 2022-05-27
Payer: COMMERCIAL

## 2022-05-27 VITALS
HEIGHT: 65 IN | TEMPERATURE: 98.9 F | SYSTOLIC BLOOD PRESSURE: 118 MMHG | BODY MASS INDEX: 30.82 KG/M2 | WEIGHT: 185 LBS | HEART RATE: 74 BPM | DIASTOLIC BLOOD PRESSURE: 74 MMHG

## 2022-05-27 DIAGNOSIS — H69.83 DYSFUNCTION OF BOTH EUSTACHIAN TUBES: Primary | ICD-10-CM

## 2022-05-27 PROCEDURE — 99213 OFFICE O/P EST LOW 20 MIN: CPT | Performed by: INTERNAL MEDICINE

## 2022-05-27 PROCEDURE — 3725F SCREEN DEPRESSION PERFORMED: CPT | Performed by: INTERNAL MEDICINE

## 2022-05-27 PROCEDURE — 3008F BODY MASS INDEX DOCD: CPT | Performed by: INTERNAL MEDICINE

## 2022-05-27 PROCEDURE — 1036F TOBACCO NON-USER: CPT | Performed by: INTERNAL MEDICINE

## 2022-05-27 NOTE — PROGRESS NOTES
Assessment/Plan:    No problem-specific Assessment & Plan notes found for this encounter  Diagnoses and all orders for this visit:    Dysfunction of both eustachian tubes          I did not find evidence of bacterial upper respiratory infection such as otitis media, sinusitis  Patient does not have prominent symptoms of allergic rhinitis  Suspect she has eustachian tube dysfunction  Will try Flonase nasal spray and Allegra  There was no indication for antibiotics today but I asked patient to call if anything changes  Subjective:      Patient ID: Jan Holman is a 64 y o  female  Patient presents with chief complaint of bilateral ear pain, crackling noises in the ears for going on for about 10 days  She denies fevers, chills, sneezing, decreased hearing  She has some runny nose but not significant  She also had some postnasal dripping  Denies pruritus of the nose, eyes, throat  She had COVID infection in January  Patient presents for follow-up of chronic conditions as detailed in the assessment and plan  The following portions of the patient's history were reviewed and updated as appropriate: current medications, past family history, past medical history, past social history, past surgical history and problem list     Review of Systems      Objective:    /74   Pulse 74   Temp 98 9 °F (37 2 °C) (Tympanic)   Ht 5' 5" (1 651 m)   Wt 83 9 kg (185 lb)   LMP  (LMP Unknown)   BMI 30 79 kg/m²      Physical Exam  Constitutional:       General: She is not in acute distress  Appearance: She is not ill-appearing or toxic-appearing  HENT:      Head: Normocephalic  Comments: Mastoid processes were not tender  Right Ear: Ear canal and external ear normal  There is no impacted cerumen  Left Ear: Ear canal and external ear normal  There is no impacted cerumen  Ears:      Comments: TMs were retracted  I did not see any erythema, air fluid levels    Light reflex of the TMs was normal     Nose: No congestion or rhinorrhea  Mouth/Throat:      Mouth: Mucous membranes are moist       Pharynx: No oropharyngeal exudate or posterior oropharyngeal erythema  Comments: Patient had no TMJ tenderness on palpation  Parotid glands were not enlarged or tender  Eyes:      Conjunctiva/sclera: Conjunctivae normal    Musculoskeletal:      Cervical back: Neck supple  Lymphadenopathy:      Cervical: No cervical adenopathy  Skin:     General: Skin is warm and dry  Findings: No rash  Neurological:      Mental Status: She is alert and oriented to person, place, and time  Cranial Nerves: No cranial nerve deficit  Motor: No weakness               Isidro Churchill MD

## 2022-05-27 NOTE — PATIENT INSTRUCTIONS
Use fluticasone nasal spray one spray twice a day for two weeks!  Consider using afrin nasal spray twice a day for 3 days and taking allegra 180mg daily

## 2022-06-16 DIAGNOSIS — G43.009 MIGRAINE WITHOUT AURA AND WITHOUT STATUS MIGRAINOSUS, NOT INTRACTABLE: ICD-10-CM

## 2022-06-16 RX ORDER — ZOLMITRIPTAN 2.5 MG/1
2.5 TABLET, ORALLY DISINTEGRATING ORAL DAILY PRN
Qty: 18 TABLET | Refills: 3 | Status: SHIPPED | OUTPATIENT
Start: 2022-06-16 | End: 2022-09-14

## 2022-07-19 ENCOUNTER — OFFICE VISIT (OUTPATIENT)
Dept: GASTROENTEROLOGY | Facility: CLINIC | Age: 57
End: 2022-07-19
Payer: COMMERCIAL

## 2022-07-19 VITALS
WEIGHT: 187 LBS | SYSTOLIC BLOOD PRESSURE: 112 MMHG | HEART RATE: 70 BPM | HEIGHT: 65 IN | BODY MASS INDEX: 31.16 KG/M2 | DIASTOLIC BLOOD PRESSURE: 70 MMHG

## 2022-07-19 DIAGNOSIS — Z86.010 HISTORY OF COLONIC POLYPS: ICD-10-CM

## 2022-07-19 DIAGNOSIS — K21.9 GERD WITHOUT ESOPHAGITIS: Primary | ICD-10-CM

## 2022-07-19 PROCEDURE — 99213 OFFICE O/P EST LOW 20 MIN: CPT | Performed by: INTERNAL MEDICINE

## 2022-07-19 RX ORDER — OMEPRAZOLE 40 MG/1
40 CAPSULE, DELAYED RELEASE ORAL DAILY
Qty: 90 CAPSULE | Refills: 12 | Status: SHIPPED | OUTPATIENT
Start: 2022-07-19

## 2022-07-19 NOTE — PROGRESS NOTES
[id number] - Outpatient Follow-up Note  Samira Jama 64 y o  female MRN: [id number]  Encounter: [id number]    ASSESSMENT AND PLAN:      1  GERD without esophagitis  Taking omeprazole 20 milligrams daily with Pepcid at bedtime  Having increasing breakthrough symptoms  Probably will have more reflux issues with when/if starts chemotherapy  - increase omeprazole (PriLOSEC) 40 MG capsule; Take 1 capsule (40 mg total) by mouth daily  Dispense: 90 capsule; Refill: 12  - take Pepcid at bedtime as needed    2  History of colonic polyps  Last colonoscopy June 2021  Due for follow-up 2026      Followup Appointment:  6 months  ______________________________________________________________________    Chief Complaint   Patient presents with    Follow-up     Annual followup; still having ongoing heartburn/acid reflux with a hoarse throat because of the acid     HPI:  The patient is seen back in the office today  From a GERD standpoint she is having increasing issues  She continues take omeprazole 20 milligrams in the morning and occasional Pepcid at bedtime  She reports burning in the back of her throat with increasing reflux symptoms  She denies any dysphagia, odynophagia, early satiety  She denies any chest or abdominal pain  Her weight was down a few pounds but this was deliberate  She reports her stress level is up since she recently found out that her lymphoma is back  She switched to see medical oncology to discuss chemotherapy options at [facility]  He continues to try to watch the spicy and fatty foods  She tries to limit her alcohol intake  Historical Information   Past Medical History:   Diagnosis Date    Cancer ([facility] )     non-hogikins lymphoma     Convergence insufficiency     bilateral eyes      COVID-19 01/2022    Disease of thyroid gland     Endometrial hyperplasia     Enhanced S-cone syndrome     eyes    Follicular lymphoma ([facility] ) 04/2020    GERD (gastroesophageal reflux disease)     Irregular heart beat     SVT    Non Hodgkin's [facility] ([facility] ) 2020    stage 1, had surgery and XRT to the neck( finished july 2020)    Overweight     Retinitis pigmentosa      Past Surgical History:   Procedure Laterality Date    ANAL SPHINCTEROPLASTY      ANAL/SPHINCTEROPLASTY      ANTERIOR AND POSTERIOR VAGINAL REPAIR      BLADDER SUSPENSION      DILATION AND CURETTAGE OF UTERUS      X 2    HYSTERECTOMY  2014    OOPHORECTOMY Bilateral 2014    with hysterectomy    TONSILLECTOMY AND ADENOIDECTOMY      US GUIDED LYMPH NODE BIOPSY LEFT  4/21/2020     Social History     Substance and Sexual Activity   Alcohol Use Yes    Comment: rare     Social History     Substance and Sexual Activity   Drug Use Yes    Types: Marijuana    Comment: help to sleep, medical marijuana card     Social History     Tobacco Use   Smoking Status Never Smoker   Smokeless Tobacco Never Used     Family History   Problem Relation Age of Onset    Cancer Mother         lung    COPD Father     Diabetes type II Brother         on insulin    Substance Abuse Sister     HIV Sister     No Known Problems Daughter     No Known Problems Maternal Grandmother     No Known Problems Paternal Grandmother     Throat cancer Maternal Grandfather     Other Maternal Grandfather         Aortic valve replacement     No Known Problems Paternal Grandfather     Mental illness Neg Hx     Colon cancer Neg Hx     Colon polyps Neg Hx          Current Outpatient Medications:     estradiol (ESTRACE) 0 5 MG tablet    ibuprofen (MOTRIN) 200 mg tablet    levothyroxine 125 mcg tablet    Lutein 20 MG CAPS    Multiple Vitamins-Minerals (MULTIVITAMIN GUMMIES WOMENS) CHEW    omeprazole (PriLOSEC) 40 MG capsule    verapamil (CALAN-SR) 120 mg CR tablet    VITAMIN D, ERGOCALCIFEROL, PO    ZOLMitriptan (ZOMIG-ZMT) 2 5 MG disintegrating tablet  Allergies   Allergen Reactions    Latex Rash and Hives    Iodinated Diagnostic Agents Sneezing     Sneezing, tongue tingling  Sneezing, tongue tingling      Other      Other reaction(s): hair dye causes rash  Orange coating    Lansoprazole Rash     Reviewed medications and allergies and updated as indicated    PHYSICAL EXAM:    Blood pressure 112/70, pulse 70, height 5' 5" (1 651 m), weight 84 8 kg (187 lb)  Body mass index is 31 12 kg/m²  General Appearance: NAD, cooperative, alert  Eyes: Anicteric, PERRLA, EOMI  ENT:  Normocephalic, atraumatic, normal mucosa  Neck:  Supple, symmetrical, trachea midline  Resp:  Clear to auscultation bilaterally; no rales, rhonchi or wheezing; respirations unlabored   CV:  S1 S2, Regular rate and rhythm; no murmur, rub, or gallop  GI:  Soft, non-tender, non-distended; normal bowel sounds; no masses, no organomegaly   Rectal: Deferred  Musculoskeletal: No cyanosis, clubbing or edema  Normal ROM  Skin:  No jaundice, rashes, or lesions   Heme/Lymph: No palpable cervical lymphadenopathy  Psych: Normal affect, good eye contact  Neuro: No gross deficits, AAOx3    Lab Results:   Lab Results   Component Value Date    WBC 4 7 06/26/2020    HGB 13 6 06/26/2020    HCT 40 1 06/26/2020    MCV 85 06/26/2020     06/26/2020     Lab Results   Component Value Date     03/10/2017    K 4 1 09/21/2021     09/21/2021    CO2 27 09/21/2021    BUN 12 09/21/2021    CREATININE 0 86 09/21/2021    GLUCOSE 82 03/10/2017    AST 29 09/21/2021    ALT 27 09/21/2021     Radiology Results:   No results found

## 2022-07-19 NOTE — LETTER
July 19, 2022     Terra Delarosa, [address]  [address] [address]    Patient: Tonya Box   YOB: 1965   Date of Visit: 7/19/2022       Dear Dr Sergey Anaya: Thank you for referring Yadira Ugalde to me for evaluation  Below are my notes for this consultation  If you have questions, please do not hesitate to call me  I look forward to following your patient along with you  Sincerely,        Katia Burns MD        CC: No Recipients  Katia Burns MD  7/19/2022  4:01 PM  Sign when Signing Visit  [facility] - Outpatient Follow-up Note  Tonya Box 64 y o  female MRN: [id number]  Encounter: [id number]    ASSESSMENT AND PLAN:      1  GERD without esophagitis  Taking omeprazole 20 milligrams daily with Pepcid at bedtime  Having increasing breakthrough symptoms  Probably will have more reflux issues with when/if starts chemotherapy  - increase omeprazole (PriLOSEC) 40 MG capsule; Take 1 capsule (40 mg total) by mouth daily  Dispense: 90 capsule; Refill: 12  - take Pepcid at bedtime as needed    2  History of colonic polyps  Last colonoscopy June 2021  Due for follow-up 2026      Followup Appointment:  6 months  ______________________________________________________________________    Chief Complaint   Patient presents with    Follow-up     Annual followup; still having ongoing heartburn/acid reflux with a hoarse throat because of the acid     HPI:  The patient is seen back in the office today  From a GERD standpoint she is having increasing issues  She continues take omeprazole 20 milligrams in the morning and occasional Pepcid at bedtime  She reports burning in the back of her throat with increasing reflux symptoms  She denies any dysphagia, odynophagia, early satiety  She denies any chest or abdominal pain  Her weight was down a few pounds but this was deliberate    She reports her stress level is up since she recently found out that her lymphoma is back  She switched to see medical oncology to discuss chemotherapy options at [facility]  He continues to try to watch the spicy and fatty foods  She tries to limit her alcohol intake  Historical Information   Past Medical History:   Diagnosis Date    Cancer ([facility] )     non-hogikins lymphoma     Convergence insufficiency     bilateral eyes      COVID-19 01/2022    Disease of thyroid gland     Endometrial hyperplasia     Enhanced S-cone syndrome     eyes    Follicular lymphoma ([facility] ) 04/2020    GERD (gastroesophageal reflux disease)     Irregular heart beat     SVT    Non Hodgkin's lymphoma ([facility] ) 2020    stage 1, had surgery and XRT to the neck( finished july 2020)    Overweight     Retinitis pigmentosa      Past Surgical History:   Procedure Laterality Date    ANAL SPHINCTEROPLASTY      ANAL/SPHINCTEROPLASTY      ANTERIOR AND POSTERIOR VAGINAL REPAIR      BLADDER SUSPENSION      DILATION AND CURETTAGE OF UTERUS      X 2    HYSTERECTOMY  2014    OOPHORECTOMY Bilateral 2014    with hysterectomy    TONSILLECTOMY AND ADENOIDECTOMY      US GUIDED LYMPH NODE BIOPSY LEFT  4/21/2020     Social History     Substance and Sexual Activity   Alcohol Use Yes    Comment: rare     Social History     Substance and Sexual Activity   Drug Use Yes    Types: Marijuana    Comment: help to sleep, medical marijuana card     Social History     Tobacco Use   Smoking Status Never Smoker   Smokeless Tobacco Never Used     Family History   Problem Relation Age of Onset    Cancer Mother         lung    COPD Father     Diabetes type II Brother         on insulin    Substance Abuse Sister     HIV Sister     No Known Problems Daughter     No Known Problems Maternal Grandmother     No Known Problems Paternal Grandmother     Throat cancer Maternal Grandfather     Other Maternal Grandfather         Aortic valve replacement     No Known Problems Paternal Grandfather     Mental illness Neg Hx     Colon cancer Neg Hx     Colon polyps Neg Hx          Current Outpatient Medications:     estradiol (ESTRACE) 0 5 MG tablet    ibuprofen (MOTRIN) 200 mg tablet    levothyroxine 125 mcg tablet    Lutein 20 MG CAPS    Multiple Vitamins-Minerals (MULTIVITAMIN GUMMIES WOMENS) CHEW    omeprazole (PriLOSEC) 40 MG capsule    verapamil (CALAN-SR) 120 mg CR tablet    VITAMIN D, ERGOCALCIFEROL, PO    ZOLMitriptan (ZOMIG-ZMT) 2 5 MG disintegrating tablet  Allergies   Allergen Reactions    Latex Rash and Hives    Iodinated Diagnostic Agents Sneezing     Sneezing, tongue tingling  Sneezing, tongue tingling      Other      Other reaction(s): hair dye causes rash  Orange coating    Lansoprazole Rash     Reviewed medications and allergies and updated as indicated    PHYSICAL EXAM:    Blood pressure 112/70, pulse 70, height 5' 5" (1 651 m), weight 84 8 kg (187 lb)  Body mass index is 31 12 kg/m²  General Appearance: NAD, cooperative, alert  Eyes: Anicteric, PERRLA, EOMI  ENT:  Normocephalic, atraumatic, normal mucosa  Neck:  Supple, symmetrical, trachea midline  Resp:  Clear to auscultation bilaterally; no rales, rhonchi or wheezing; respirations unlabored   CV:  S1 S2, Regular rate and rhythm; no murmur, rub, or gallop  GI:  Soft, non-tender, non-distended; normal bowel sounds; no masses, no organomegaly   Rectal: Deferred  Musculoskeletal: No cyanosis, clubbing or edema  Normal ROM    Skin:  No jaundice, rashes, or lesions   Heme/Lymph: No palpable cervical lymphadenopathy  Psych: Normal affect, good eye contact  Neuro: No gross deficits, AAOx3    Lab Results:   Lab Results   Component Value Date    WBC 4 7 06/26/2020    HGB 13 6 06/26/2020    HCT 40 1 06/26/2020    MCV 85 06/26/2020     06/26/2020     Lab Results   Component Value Date     03/10/2017    K 4 1 09/21/2021     09/21/2021    CO2 27 09/21/2021    BUN 12 09/21/2021    CREATININE 0 86 09/21/2021    GLUCOSE 82 03/10/2017    AST 29 09/21/2021    ALT 27 09/21/2021     Radiology Results:   No results found

## 2022-10-10 DIAGNOSIS — Z78.0 POSTMENOPAUSAL: Primary | ICD-10-CM

## 2022-11-08 ENCOUNTER — HOSPITAL ENCOUNTER (OUTPATIENT)
Dept: BONE DENSITY | Facility: IMAGING CENTER | Age: 57
Discharge: HOME | End: 2022-11-08

## 2022-11-08 DIAGNOSIS — Z78.0 POSTMENOPAUSAL: ICD-10-CM

## 2022-12-03 DIAGNOSIS — E06.3 HYPOTHYROIDISM DUE TO HASHIMOTO'S THYROIDITIS: ICD-10-CM

## 2022-12-03 DIAGNOSIS — E03.8 HYPOTHYROIDISM DUE TO HASHIMOTO'S THYROIDITIS: ICD-10-CM

## 2022-12-05 RX ORDER — LEVOTHYROXINE SODIUM 0.12 MG/1
TABLET ORAL
Qty: 90 TABLET | Refills: 0 | Status: SHIPPED | OUTPATIENT
Start: 2022-12-05

## 2022-12-05 NOTE — TELEPHONE ENCOUNTER
Requested medication(s) are due for refill today: Yes  Patient has already received a courtesy refill: No  Other reason request has been forwarded to provider: Guidelines failed

## 2022-12-20 ENCOUNTER — HOSPITAL ENCOUNTER (OUTPATIENT)
Dept: ULTRASOUND IMAGING | Facility: HOSPITAL | Age: 57
Discharge: HOME | End: 2022-12-20

## 2022-12-20 DIAGNOSIS — E03.8 HYPOTHYROIDISM DUE TO HASHIMOTO'S THYROIDITIS: ICD-10-CM

## 2022-12-20 DIAGNOSIS — E06.3 HYPOTHYROIDISM DUE TO HASHIMOTO'S THYROIDITIS: ICD-10-CM

## 2022-12-20 DIAGNOSIS — E04.9 GOITER: ICD-10-CM

## 2022-12-20 DIAGNOSIS — Z92.3 HISTORY OF RADIATION TO HEAD AND NECK REGION: ICD-10-CM

## 2023-01-24 ENCOUNTER — OFFICE VISIT (OUTPATIENT)
Dept: ENDOCRINOLOGY | Facility: HOSPITAL | Age: 58
End: 2023-01-24

## 2023-01-24 VITALS
HEART RATE: 79 BPM | HEIGHT: 65 IN | WEIGHT: 186.4 LBS | DIASTOLIC BLOOD PRESSURE: 70 MMHG | BODY MASS INDEX: 31.06 KG/M2 | SYSTOLIC BLOOD PRESSURE: 108 MMHG

## 2023-01-24 DIAGNOSIS — E03.8 HYPOTHYROIDISM DUE TO HASHIMOTO'S THYROIDITIS: Primary | ICD-10-CM

## 2023-01-24 DIAGNOSIS — E06.3 HYPOTHYROIDISM DUE TO HASHIMOTO'S THYROIDITIS: Primary | ICD-10-CM

## 2023-01-24 DIAGNOSIS — E55.9 VITAMIN D DEFICIENCY: ICD-10-CM

## 2023-01-24 DIAGNOSIS — E04.9 GOITER: ICD-10-CM

## 2023-01-24 DIAGNOSIS — E34.9 ELEVATED PARATHYROID HORMONE: ICD-10-CM

## 2023-01-24 PROBLEM — R79.89 ELEVATED PARATHYROID HORMONE: Status: ACTIVE | Noted: 2023-01-24

## 2023-01-24 RX ORDER — LEVOTHYROXINE SODIUM 0.12 MG/1
TABLET ORAL
Qty: 90 TABLET | Refills: 3 | Status: SHIPPED | OUTPATIENT
Start: 2023-01-24

## 2023-01-24 RX ORDER — CALCIUM CARBONATE 1000 MG/1
TABLET, CHEWABLE ORAL
COMMUNITY

## 2023-01-24 NOTE — PROGRESS NOTES
1/24/2023    Assessment/Plan      Diagnoses and all orders for this visit:    Hypothyroidism due to Hashimoto's thyroiditis  -     TSH, 3rd generation Lab Collect; Future  -     T4, free Lab Collect; Future  -     Comprehensive metabolic panel Lab Collect; Future  -     Phosphorus Lab Collect; Future  -     PTH, intact Lab Collect Lab Collect; Future  -     TSH, 3rd generation Lab Collect  -     T4, free Lab Collect  -     Comprehensive metabolic panel Lab Collect  -     Phosphorus Lab Collect  -     PTH, intact Lab Collect Lab Collect  -     levothyroxine 125 mcg tablet; Take 1 tablet 6 days a week and 1/2 tablet on sunday    Goiter  -     TSH, 3rd generation Lab Collect; Future  -     T4, free Lab Collect; Future  -     Comprehensive metabolic panel Lab Collect; Future  -     Phosphorus Lab Collect; Future  -     PTH, intact Lab Collect Lab Collect; Future  -     TSH, 3rd generation Lab Collect  -     T4, free Lab Collect  -     Comprehensive metabolic panel Lab Collect  -     Phosphorus Lab Collect  -     PTH, intact Lab Collect Lab Collect    Vitamin D deficiency  -     Vitamin D 25 hydroxy Lab Collect; Future  -     Comprehensive metabolic panel Lab Collect; Future  -     Phosphorus Lab Collect; Future  -     TSH, 3rd generation Lab Collect; Future  -     T4, free Lab Collect; Future  -     Vitamin D 25 hydroxy Lab Collect  -     Comprehensive metabolic panel Lab Collect  -     Phosphorus Lab Collect  -     TSH, 3rd generation Lab Collect  -     T4, free Lab Collect  -     TSH, 3rd generation Lab Collect; Future  -     T4, free Lab Collect; Future  -     Comprehensive metabolic panel Lab Collect; Future  -     Phosphorus Lab Collect; Future  -     PTH, intact Lab Collect Lab Collect;  Future  -     TSH, 3rd generation Lab Collect  -     T4, free Lab Collect  -     Comprehensive metabolic panel Lab Collect  -     Phosphorus Lab Collect  -     PTH, intact Lab Collect Lab Collect  -     PTH, intact Lab Collect Lab Collect; Future  -     PTH, intact Lab Collect Lab Collect    Elevated parathyroid hormone  -     Vitamin D 25 hydroxy Lab Collect; Future  -     Comprehensive metabolic panel Lab Collect; Future  -     Phosphorus Lab Collect; Future  -     TSH, 3rd generation Lab Collect; Future  -     T4, free Lab Collect; Future  -     Vitamin D 25 hydroxy Lab Collect  -     Comprehensive metabolic panel Lab Collect  -     Phosphorus Lab Collect  -     TSH, 3rd generation Lab Collect  -     T4, free Lab Collect  -     TSH, 3rd generation Lab Collect; Future  -     T4, free Lab Collect; Future  -     Comprehensive metabolic panel Lab Collect; Future  -     Phosphorus Lab Collect; Future  -     PTH, intact Lab Collect Lab Collect; Future  -     TSH, 3rd generation Lab Collect  -     T4, free Lab Collect  -     Comprehensive metabolic panel Lab Collect  -     Phosphorus Lab Collect  -     PTH, intact Lab Collect Lab Collect  -     PTH, intact Lab Collect Lab Collect; Future  -     PTH, intact Lab Collect Lab Collect    Other orders  -     calcium carbonate (Tums Ultra 1000) 1000 MG chewable tablet; Chew 1,000 mg 3 (three) times a day prn        Assessment/Plan:  1  Hypothyroidism due to Hashimoto's thyroiditis  Most recent thyroid function studies do show a slightly low TSH signifying biochemical hyperthyroidism  I have asked her to decrease her levothyroxine to 125 mcg 1 tablet 6 days a week and half tablet on Sunday  2   Thyroid goiter  Thyroid ultrasound in December 2022 showed no thyroid nodules and a small thyroid  3   Vitamin D deficiency  She will continue the same vitamin D 5000 units daily  4   Elevated parathyroid hormone level  Blood work in November 2022 does show a normal calcium and vitamin D level with an elevated parathyroid hormone level  We will repeat this blood work in several months      She will get work done in 3 months consisting of a CMP, phosphorus, parathyroid hormone level, SH, free T4, and 25-hydroxy vitamin D level  DEXA scan was stable  I have asked her to follow-up in 1 year with preceding TSH, free T4, CMP, phosphorus, and intact parathyroid hormone level      CC: Hypothyroid, goiter, vitamin D deficiency, elevated parathyroid hormone level follow-up    History of Present Illness     HPI: Leandro Munguia is a 62y o  year old female with history of hypothyroidism due to Hashimoto's thyroiditis with goiter for follow-up visit  She was diagnosed with Hashimoto's thyroiditis in 2003 and TSH was over 7, she was started on thyroid hormone for replacement purposes  She is currently taking levothyroxine 125 mcg daily  She has a history of a thyroid goiter  Last thyroid ultrasound was 2017 showing a very mild goiter  No repeat thyroid ultrasounds need to be performed at this point unless her symptoms change  She had XRT to the left side of the neck for lymphoma treatment  Weight is 10 pounds less than last year  She does get occasional palpitations  She has alternating diarrhea and constipation  She can be hot and sweaty at night  She has dry skin and brittle nails along with some hair loss unchanged  She denies fatigue, tremors, or cold intolerance  She denies anxiety or depression  She has some sleeping that is interrupted with pain in her neck or shoulder or urination  She has no compressive thyroid symptoms or difficulties with swallowing  She denies diplopia  Review of Systems   Constitutional: Negative for fatigue and unexpected weight change  Weight 10 pounds less than last year  HENT: Positive for ear pain and sinus pressure  Negative for trouble swallowing  Feels sinus pressure and headache and right ear pain with lymphoma recurrence in naso pharynx  Eyes: Negative for visual disturbance  Wears glasses  Respiratory: Negative for chest tightness and shortness of breath  Cardiovascular: Positive for palpitations  Negative for chest pain  Occasional palpitations  Gastrointestinal: Positive for constipation and diarrhea  Negative for abdominal pain and nausea  Alternating diarrhea or constipation  Endocrine: Positive for heat intolerance  Negative for cold intolerance  Hot and sweaty at night  Skin: Negative for rash  Has dry skin  Has brittle nails  Has hair loss unchanged  Neurological: Negative for dizziness, tremors, light-headedness and headaches  Psychiatric/Behavioral: Positive for sleep disturbance  Negative for dysphoric mood  The patient is not nervous/anxious  Sleeping interrupted by neck and shoulder pain and urination and then awake and mind racing  Historical Information   Past Medical History:   Diagnosis Date   • Cancer ([place] [address] )     non-hogikins lymphoma    • Convergence insufficiency     bilateral eyes     • COVID-19 01/2022   • Disease of thyroid gland    • Endometrial hyperplasia    • Enhanced S-cone syndrome     eyes   • Follicular lymphoma ([place] [address] ) 04/2020   • GERD (gastroesophageal reflux disease)    • Irregular heart beat     SVT   • Non Hodgkin's lymphoma ([place] [address] ) 2020    stage 1, had surgery and XRT to the neck( finished july 2020), rituximab in falll 2022 for recurrence in sinus   • Overweight    • Retinitis pigmentosa      Past Surgical History:   Procedure Laterality Date   • ANAL SPHINCTEROPLASTY     • ANAL/SPHINCTEROPLASTY     • ANTERIOR AND POSTERIOR VAGINAL REPAIR     • BLADDER SUSPENSION     • DILATION AND CURETTAGE OF UTERUS      X 2   • HYSTERECTOMY  2014   • OOPHORECTOMY Bilateral 2014    with hysterectomy   • TONSILLECTOMY AND ADENOIDECTOMY     • [address] LEFT  4/21/2020     Social History   Social History     Substance and Sexual Activity   Alcohol Use Yes    Comment: rare     Social History     Substance and Sexual Activity   Drug Use Yes   • Types: Marijuana    Comment: help to sleep, medical marijuana card     Social History     Tobacco Use Smoking Status Never   Smokeless Tobacco Never     Family History:   Family History   Problem Relation Age of Onset   • Cancer Mother         lung   • COPD Father    • Diabetes type II Brother         on insulin   • Substance Abuse Sister    • HIV Sister    • No Known Problems Daughter    • No Known Problems Maternal Grandmother    • No Known Problems Paternal Grandmother    • Throat cancer Maternal Grandfather    • Other Maternal Grandfather         Aortic valve replacement    • No Known Problems Paternal Grandfather    • Mental illness Neg Hx    • Colon cancer Neg Hx    • Colon polyps Neg Hx        Meds/Allergies   Current Outpatient Medications   Medication Sig Dispense Refill   • calcium carbonate (Tums Ultra 1000) 1000 MG chewable tablet Chew 1,000 mg 3 (three) times a day prn     • estradiol (ESTRACE) 0 5 MG tablet Take 0 5 mg by mouth daily      • ibuprofen (MOTRIN) 200 mg tablet Take 600 mg by mouth every 6 (six) hours as needed for mild pain     • levothyroxine 125 mcg tablet Take 1 tablet 6 days a week and 1/2 tablet on sunday 90 tablet 3   • Lutein 20 MG CAPS Take by mouth daily     • Multiple Vitamins-Minerals (MULTIVITAMIN GUMMIES WOMENS) CHEW Chew 2 each daily     • omeprazole (PriLOSEC) 40 MG capsule Take 1 capsule (40 mg total) by mouth daily 90 capsule 12   • verapamil (CALAN-SR) 120 mg CR tablet Take 120 mg by mouth     • VITAMIN D, ERGOCALCIFEROL, PO Take 5,000 Units by mouth      • ZOLMitriptan (ZOMIG-ZMT) 2 5 MG disintegrating tablet Take 1 tablet (2 5 mg total) by mouth daily as needed for migraine 18 tablet 3     No current facility-administered medications for this visit       Allergies   Allergen Reactions   • Latex Rash and Hives   • Iodinated Contrast Media Sneezing     Sneezing, tongue tingling  Sneezing, tongue tingling     • Other      Other reaction(s): hair dye causes rash  Orange coating   • Lansoprazole Rash       Objective   Vitals: Blood pressure 108/70, pulse 79, height 5' 5" (1 651 m), weight 84 6 kg (186 lb 6 4 oz)  Invasive Devices     None                 Physical Exam  Vitals reviewed  Constitutional:       Appearance: Normal appearance  She is well-developed  HENT:      Head: Normocephalic and atraumatic  Eyes:      Extraocular Movements: Extraocular movements intact  Conjunctiva/sclera: Conjunctivae normal       Comments: No lid lag, stare, proptosis, or periorbital edema  Neck:      Thyroid: No thyromegaly  Vascular: No carotid bruit  Comments: Thyroid normal in size  No palpable thyroid nodules  No masses of the neck  Cardiovascular:      Rate and Rhythm: Normal rate and regular rhythm  Heart sounds: Normal heart sounds  No murmur heard  Pulmonary:      Effort: Pulmonary effort is normal       Breath sounds: Normal breath sounds  No wheezing  Abdominal:      Palpations: Abdomen is soft  Musculoskeletal:         General: No deformity  Normal range of motion  Cervical back: Normal range of motion and neck supple  Right lower leg: No edema  Left lower leg: No edema  Comments: No tremor of the outstretched hands  No CVA tenderness  No spinous process tenderness  Lymphadenopathy:      Cervical: No cervical adenopathy  Skin:     General: Skin is warm and dry  Findings: No rash  Neurological:      Mental Status: She is alert and oriented to person, place, and time  Deep Tendon Reflexes: Reflexes are normal and symmetric  Comments: Patellar deep tendon reflexes normal          The history was obtained from the review of the chart and from the patient  Lab Results:      Blood work done on 11/29/2022 at [facility] showed a CMP with a glucose of 99, 9 2 with an albumin of 3 6 correcting to her calcium high normal but was otherwise normal     Parathyroid hormone level is 103  TSH is 0 24 with a free T4 of 1 47      Lab Results   Component Value Date    CREATININE 0 86 09/21/2021 CREATININE 0 80 06/26/2020    CREATININE 0 82 03/10/2017    BUN 12 09/21/2021     03/10/2017    K 4 1 09/21/2021     09/21/2021    CO2 27 09/21/2021         Lab Results   Component Value Date    CHOL 198 03/10/2017    HDL 45 09/21/2021    TRIG 147 09/21/2021       Lab Results   Component Value Date    ALT 27 09/21/2021    AST 29 09/21/2021       Lab Results   Component Value Date    TSH 0 578 09/21/2021       Thyroid ultrasound:    THYROID ULTRASOUND performed on 12/20/2022     INDICATION:    E03 8: Other specified hypothyroidism  E06 3: Autoimmune thyroiditis  E04 9: Nontoxic goiter, unspecified  Z92 3: Personal history of irradiation      COMPARISON:  4/3/2017     TECHNIQUE:   Ultrasound of the thyroid was performed with a high frequency linear transducer in transverse and sagittal planes including volumetric imaging sweeps as well as traditional still imaging technique      FINDINGS:  Thyroid parenchyma is small and diffusely heterogeneous in echotexture  No hypervascularity      Right lobe: 3 9 x 0 9 x 1 2 cm  Volume 2 0 mL  Left lobe:  2 9 x 0 8 x 1 4 cm  Volume 1 5 mL  Isthmus: 0 3  cm      No thyroid nodules are demonstrated      IMPRESSION:  Small heterogeneous thyroid without discrete nodule  DEXA scan:    DXA SCAN performed on 11/8/2022     CLINICAL HISTORY:  70-year-old postmenopausal female  OTHER RISK FACTORS:  History of fracture resulting from minor injury  PPI therapy      PHARMACOLOGIC THERAPY FOR OSTEOPOROSIS:  None      TECHNIQUE: Bone densitometry was performed using a Hologic Horizon A  bone densitometer    Regions of interest appear properly placed          COMPARISON: 1/13/2011      RESULTS:      LUMBAR SPINE  Level: L1, L4   (L2, L3 vertebrae excluded from analysis due to local structural abnormalities or artifact) :   BMD:  1 083  gm/cm2   T-score: 0 4         LEFT  TOTAL HIP:   BMD:  1 035  gm/cm2    T-score:  0 8     LEFT  FEMORAL NECK:   BMD:  0 708  gm/cm2   T score: -1 3      LEFT  FOREARM:    33% RADIUS BMD:  0 702  gm/cm2  T-score:  0 3         IMPRESSION:     1  Low bone mass (osteopenia)  [Based on the left femoral neck]     2   Since a DXA study from 1/13/2011, there has been:  A  STATISTICALLY SIGNIFICANT INCREASE in bone mineral density of  0 025 g/cm2 (2 4)% in the lumbar spine  A  STATISTICALLY SIGNIFICANT INCREASE in bone mineral density of  0 027 g/cm2 (2 6)% in the left total hip  A  STATISTICALLY SIGNIFICANT DECREASE in bone mineral density of  0 039 g/cm2 (5 3)% in the left radius      3  The 10 year risk of hip fracture is 0 8% with the 10 year risk of major osteoporotic fracture being 11% as calculated by the [facility] fracture risk assessment tool (FRAX, which is based on data generated by the [facility]   for Metabolic Bone Diseases)      4    The current NOF guidelines recommend treating patients with a T-score of -2 5 or less in the lumbar spine or hips, or in post-menopausal women and men over the age of 48 with low bone mass (osteopenia) and a FRAX 10 year risk score of >3% for hip   fracture and/or >20% for major osteoporotic fracture          Future Appointments   Date Time Provider Cj Hernandez   1/31/2023  4:30 PM Farzaneh Appiah MD [facility]   1/30/2024 10:20 AM Alexandra Lucas MD [facility]

## 2023-01-24 NOTE — PATIENT INSTRUCTIONS
The thyroid is overactive slightly  Let's decrease the levothyroxine 125 mcg 1 tablet 6 days a week and 1/2 tablet on Sunday  We'll recheck the thyroid blood work with the calcium, parathyroid, and vitamin D in 3 months with blood work  Follow up in 1 year with blood work

## 2023-01-31 ENCOUNTER — OFFICE VISIT (OUTPATIENT)
Dept: GASTROENTEROLOGY | Facility: CLINIC | Age: 58
End: 2023-01-31

## 2023-01-31 VITALS
DIASTOLIC BLOOD PRESSURE: 82 MMHG | HEIGHT: 65 IN | BODY MASS INDEX: 30.99 KG/M2 | WEIGHT: 186 LBS | SYSTOLIC BLOOD PRESSURE: 122 MMHG

## 2023-01-31 DIAGNOSIS — K58.2 IRRITABLE BOWEL SYNDROME WITH BOTH CONSTIPATION AND DIARRHEA: ICD-10-CM

## 2023-01-31 DIAGNOSIS — K21.9 GERD WITHOUT ESOPHAGITIS: ICD-10-CM

## 2023-01-31 DIAGNOSIS — K21.00 GASTROESOPHAGEAL REFLUX DISEASE WITH ESOPHAGITIS, UNSPECIFIED WHETHER HEMORRHAGE: Primary | ICD-10-CM

## 2023-01-31 DIAGNOSIS — Z86.010 HISTORY OF COLONIC POLYPS: ICD-10-CM

## 2023-01-31 RX ORDER — OMEPRAZOLE 40 MG/1
40 CAPSULE, DELAYED RELEASE ORAL DAILY
Qty: 90 CAPSULE | Refills: 12 | Status: SHIPPED | OUTPATIENT
Start: 2023-01-31

## 2023-01-31 NOTE — PATIENT INSTRUCTIONS
Continue omeprazole 40 mg in the morning  Okay to use Tums as needed  Elevate the head of the bed on a brick or 2 x 4  Continue to watch your diet    Follow-up office visit 1 year

## 2023-01-31 NOTE — LETTER
January 31, 2023     Leslie Tyler, [address] [facility] [address]  [address] [address]    Patient: Micehlle Gimenez   YOB: 1965   Date of Visit: 1/31/2023       Dear Dr Cyrus Evans: Thank you for referring Geovani Gloria to me for evaluation  Below are my notes for this consultation  If you have questions, please do not hesitate to call me  I look forward to following your patient along with you  Sincerely,        Maryam Sadler MD        CC: No Recipients  Maryam Sadler MD  1/31/2023 12:35 PM  Sign when Signing Visit  [address] [facility] - Outpatient Follow-up Note  Michelle Gimenez 62 y o  female MRN: [id number]  Encounter: [id number]    ASSESSMENT AND PLAN:      1  GERD without esophagitis  Doing well on omeprazole 40 mg daily  Does have some reflux in the middle of the night when she gets up to use the bathroom  Takes Tums which is also helping with her calcium and osteoporosis  - omeprazole (PriLOSEC) 40 MG capsule; Take 1 capsule (40 mg total) by mouth daily  Dispense: 90 capsule; Refill: 12  -Elevate the head of the bed  -Okay to continue to use Tums as needed    2  History of colonic polyps  Last colonoscopy June 2021  Due for follow-up 2026    3  Irritable bowel syndrome with both constipation and diarrhea  Alternating diarrhea and constipation  Continues to have to watch diet        Followup Appointment: 1 year  ______________________________________________________________________    Chief Complaint   Patient presents with   • yearly follow up, GERD     HPI: Patient is seen back in the office today  She has completed her course of Rituxan for the lymphoma and feels relatively well  For reflux standpoint she continues to take omeprazole 40 mg twice a day  She tried to wean off this and had recurrent symptoms  Long as she stays on it she feels well  She denies any dysphagia, odynophagia, or early satiety    She does report that her bowels alternate between diarrhea and constipation  She lives is food related although she is continuing to try to figure out which foods affect her  Historical Information   Past Medical History:   Diagnosis Date   • Cancer ([place] [address] )     non-hogikins lymphoma    • Colon polyp    • Convergence insufficiency     bilateral eyes     • COVID-19 01/2022   • Disease of thyroid gland    • Endometrial hyperplasia    • Enhanced S-cone syndrome     eyes   • Follicular lymphoma ([place] [address] ) 04/2020   • GERD (gastroesophageal reflux disease)    • Irregular heart beat     SVT   • Non Hodgkin's lymphoma ([place] [address] ) 2020    stage 1, had surgery and XRT to the neck( finished july 2020), rituximab in falll 2022 for recurrence in sinus   • Overweight    • Retinitis pigmentosa      Past Surgical History:   Procedure Laterality Date   • ANAL SPHINCTEROPLASTY     • ANAL/SPHINCTEROPLASTY     • ANTERIOR AND POSTERIOR VAGINAL REPAIR     • BLADDER SUSPENSION     • COLONOSCOPY     • DILATION AND CURETTAGE OF UTERUS      X 2   • HYSTERECTOMY  2014   • OOPHORECTOMY Bilateral 2014    with hysterectomy   • TONSILLECTOMY AND ADENOIDECTOMY     • UPPER GASTROINTESTINAL ENDOSCOPY     • US GUIDED LYMPH NODE BIOPSY LEFT  04/21/2020     Social History     Substance and Sexual Activity   Alcohol Use Yes    Comment: rare     Social History     Substance and Sexual Activity   Drug Use Yes   • Types: Marijuana    Comment: help to sleep, medical marijuana card     Social History     Tobacco Use   Smoking Status Never   Smokeless Tobacco Never     Family History   Problem Relation Age of Onset   • Cancer Mother         lung   • COPD Father    • Diabetes type II Brother         on insulin   • Substance Abuse Sister    • HIV Sister    • No Known Problems Daughter    • No Known Problems Maternal Grandmother    • No Known Problems Paternal Grandmother    • Throat cancer Maternal Grandfather    • Other Maternal Grandfather         Aortic valve replacement    • No Known Problems Paternal Grandfather    • Mental illness Neg Hx    • Colon cancer Neg Hx    • Colon polyps Neg Hx          Current Outpatient Medications:   •  calcium carbonate (Tums Ultra 1000) 1000 MG chewable tablet  •  estradiol (ESTRACE) 0 5 MG tablet  •  ibuprofen (MOTRIN) 200 mg tablet  •  levothyroxine 125 mcg tablet  •  Lutein 20 MG CAPS  •  Multiple Vitamins-Minerals (MULTIVITAMIN GUMMIES WOMENS) CHEW  •  omeprazole (PriLOSEC) 40 MG capsule  •  verapamil (CALAN-SR) 120 mg CR tablet  •  VITAMIN D, ERGOCALCIFEROL, PO  •  ZOLMitriptan (ZOMIG-ZMT) 2 5 MG disintegrating tablet  Allergies   Allergen Reactions   • Latex Rash and Hives   • Iodinated Contrast Media Sneezing     Sneezing, tongue tingling  Sneezing, tongue tingling     • Other      Other reaction(s): hair dye causes rash  Orange coating   • Lansoprazole Rash     Reviewed medications and allergies and updated as indicated    PHYSICAL EXAM:    Blood pressure 122/82, height 5' 5" (1 651 m), weight 84 4 kg (186 lb)  Body mass index is 30 95 kg/m²  General Appearance: NAD, cooperative, alert  Eyes: Anicteric, PERRLA, EOMI  ENT:  Normocephalic, atraumatic, normal mucosa  Neck:  Supple, symmetrical, trachea midline  Resp:  Clear to auscultation bilaterally; no rales, rhonchi or wheezing; respirations unlabored   CV:  S1 S2, Regular rate and rhythm; no murmur, rub, or gallop  GI:  Soft, non-tender, non-distended; normal bowel sounds; no masses, no organomegaly   Rectal: Deferred  Musculoskeletal: No cyanosis, clubbing or edema  Normal ROM    Skin:  No jaundice, rashes, or lesions   Heme/Lymph: No palpable cervical lymphadenopathy  Psych: Normal affect, good eye contact  Neuro: No gross deficits, AAOx3    Lab Results:   Lab Results   Component Value Date    WBC 4 7 06/26/2020    HGB 13 6 06/26/2020    HCT 40 1 06/26/2020    MCV 85 06/26/2020     06/26/2020     Lab Results   Component Value Date     03/10/2017    K 4 1 09/21/2021     09/21/2021    CO2 27 09/21/2021    BUN 12 09/21/2021    CREATININE 0 86 09/21/2021    GLUCOSE 82 03/10/2017    AST 29 09/21/2021    ALT 27 09/21/2021       Radiology Results:   No results found

## 2023-01-31 NOTE — PROGRESS NOTES
[facility] - Outpatient Follow-up Note  Parish Aguilar 62 y o  female MRN: [id number]  Encounter: [id number]    ASSESSMENT AND PLAN:      1  GERD without esophagitis  Doing well on omeprazole 40 mg daily  Does have some reflux in the middle of the night when she gets up to use the bathroom  Takes Tums which is also helping with her calcium and osteoporosis  - omeprazole (PriLOSEC) 40 MG capsule; Take 1 capsule (40 mg total) by mouth daily  Dispense: 90 capsule; Refill: 12  -Elevate the head of the bed  -Okay to continue to use Tums as needed    2  History of colonic polyps  Last colonoscopy June 2021  Due for follow-up 2026    3  Irritable bowel syndrome with both constipation and diarrhea  Alternating diarrhea and constipation  Continues to have to watch diet        Followup Appointment: 1 year  ______________________________________________________________________    Chief Complaint   Patient presents with   • yearly follow up, GERD     HPI: Patient is seen back in the office today  She has completed her course of Rituxan for the lymphoma and feels relatively well  For reflux standpoint she continues to take omeprazole 40 mg twice a day  She tried to wean off this and had recurrent symptoms  Long as she stays on it she feels well  She denies any dysphagia, odynophagia, or early satiety  She does report that her bowels alternate between diarrhea and constipation  She lives is food related although she is continuing to try to figure out which foods affect her  Historical Information   Past Medical History:   Diagnosis Date   • Cancer ([facility] )     non-hogikins lymphoma    • Colon polyp    • Convergence insufficiency     bilateral eyes     • COVID-19 01/2022   • Disease of thyroid gland    • Endometrial hyperplasia    • Enhanced S-cone syndrome     eyes   • Follicular [facility] ) 04/2020   • GERD (gastroesophageal reflux disease)    • Irregular heart beat     SVT   • Non Hodgkin's lymphoma ([facility] [address] ) 2020    stage 1, had surgery and XRT to the neck( finished july 2020), rituximab in falll 2022 for recurrence in sinus   • Overweight    • Retinitis pigmentosa      Past Surgical History:   Procedure Laterality Date   • ANAL SPHINCTEROPLASTY     • ANAL/SPHINCTEROPLASTY     • ANTERIOR AND POSTERIOR VAGINAL REPAIR     • BLADDER SUSPENSION     • COLONOSCOPY     • DILATION AND CURETTAGE OF UTERUS      X 2   • HYSTERECTOMY  2014   • OOPHORECTOMY Bilateral 2014    with hysterectomy   • TONSILLECTOMY AND ADENOIDECTOMY     • UPPER GASTROINTESTINAL ENDOSCOPY     • US GUIDED LYMPH NODE BIOPSY LEFT  04/21/2020     Social History     Substance and Sexual Activity   Alcohol Use Yes    Comment: rare     Social History     Substance and Sexual Activity   Drug Use Yes   • Types: Marijuana    Comment: help to sleep, medical marijuana card     Social History     Tobacco Use   Smoking Status Never   Smokeless Tobacco Never     Family History   Problem Relation Age of Onset   • Cancer Mother         lung   • COPD Father    • Diabetes type II Brother         on insulin   • Substance Abuse Sister    • HIV Sister    • No Known Problems Daughter    • No Known Problems Maternal Grandmother    • No Known Problems Paternal Grandmother    • Throat cancer Maternal Grandfather    • Other Maternal Grandfather         Aortic valve replacement    • No Known Problems Paternal Grandfather    • Mental illness Neg Hx    • Colon cancer Neg Hx    • Colon polyps Neg Hx          Current Outpatient Medications:   •  calcium carbonate (Tums Ultra 1000) 1000 MG chewable tablet  •  estradiol (ESTRACE) 0 5 MG tablet  •  ibuprofen (MOTRIN) 200 mg tablet  •  levothyroxine 125 mcg tablet  •  Lutein 20 MG CAPS  •  Multiple Vitamins-Minerals (MULTIVITAMIN GUMMIES WOMENS) CHEW  •  omeprazole (PriLOSEC) 40 MG capsule  •  verapamil (CALAN-SR) 120 mg CR tablet  •  VITAMIN D, ERGOCALCIFEROL, PO  •  ZOLMitriptan (ZOMIG-ZMT) 2 5 MG disintegrating tablet  Allergies   Allergen Reactions   • Latex Rash and Hives   • Iodinated Contrast Media Sneezing     Sneezing, tongue tingling  Sneezing, tongue tingling     • Other      Other reaction(s): hair dye causes rash  Orange coating   • Lansoprazole Rash     Reviewed medications and allergies and updated as indicated    PHYSICAL EXAM:    Blood pressure 122/82, height 5' 5" (1 651 m), weight 84 4 kg (186 lb)  Body mass index is 30 95 kg/m²  General Appearance: NAD, cooperative, alert  Eyes: Anicteric, PERRLA, EOMI  ENT:  Normocephalic, atraumatic, normal mucosa  Neck:  Supple, symmetrical, trachea midline  Resp:  Clear to auscultation bilaterally; no rales, rhonchi or wheezing; respirations unlabored   CV:  S1 S2, Regular rate and rhythm; no murmur, rub, or gallop  GI:  Soft, non-tender, non-distended; normal bowel sounds; no masses, no organomegaly   Rectal: Deferred  Musculoskeletal: No cyanosis, clubbing or edema  Normal ROM  Skin:  No jaundice, rashes, or lesions   Heme/Lymph: No palpable cervical lymphadenopathy  Psych: Normal affect, good eye contact  Neuro: No gross deficits, AAOx3    Lab Results:   Lab Results   Component Value Date    WBC 4 7 06/26/2020    HGB 13 6 06/26/2020    HCT 40 1 06/26/2020    MCV 85 06/26/2020     06/26/2020     Lab Results   Component Value Date     03/10/2017    K 4 1 09/21/2021     09/21/2021    CO2 27 09/21/2021    BUN 12 09/21/2021    CREATININE 0 86 09/21/2021    GLUCOSE 82 03/10/2017    AST 29 09/21/2021    ALT 27 09/21/2021       Radiology Results:   No results found

## 2023-02-08 ENCOUNTER — OFFICE VISIT (OUTPATIENT)
Dept: FAMILY MEDICINE CLINIC | Facility: HOSPITAL | Age: 58
End: 2023-02-08

## 2023-02-08 VITALS
SYSTOLIC BLOOD PRESSURE: 122 MMHG | BODY MASS INDEX: 30.82 KG/M2 | HEART RATE: 79 BPM | HEIGHT: 65 IN | WEIGHT: 185 LBS | RESPIRATION RATE: 16 BRPM | DIASTOLIC BLOOD PRESSURE: 74 MMHG | TEMPERATURE: 98.5 F | OXYGEN SATURATION: 98 %

## 2023-02-08 DIAGNOSIS — M54.41 ACUTE RIGHT-SIDED LOW BACK PAIN WITH RIGHT-SIDED SCIATICA: Primary | ICD-10-CM

## 2023-02-08 RX ORDER — METHOCARBAMOL 500 MG/1
500 TABLET, FILM COATED ORAL 3 TIMES DAILY
Qty: 15 TABLET | Refills: 1 | Status: SHIPPED | OUTPATIENT
Start: 2023-02-08

## 2023-02-08 RX ORDER — METHYLPREDNISOLONE 4 MG/1
TABLET ORAL
Qty: 21 EACH | Refills: 0 | Status: SHIPPED | OUTPATIENT
Start: 2023-02-08

## 2023-02-08 RX ORDER — FAMOTIDINE 20 MG/1
TABLET, FILM COATED ORAL
COMMUNITY

## 2023-02-08 NOTE — LETTER
February 8, 2023     Patient: Susie Velasquez  YOB: 1965  Date of Visit: 2/8/2023      To Whom it May Concern:    Nell Phelps is under my professional care  Merrill Camargo was seen in my office on 2/8/2023  Felicityabdirizak Camargo may return to work on 2/15/2023  If you have any questions or concerns, please don't hesitate to call           Sincerely,          Jessika Garcia MD        CC: No Recipients

## 2023-02-08 NOTE — PROGRESS NOTES
Assessment/Plan:    Acute right-sided low back pain with right-sided sciatica  Patient developed acute low back pain on the right side with radiation to the right groin and to the right posterolateral thigh after bending forward yesterday in the morning to pick something up on the floor  She has continuous low back pain which is worse when she sits or lays down  Somewhat better when she stands  She tried ibuprofen 800 mg twice a day to the edge off of the discomfort  She denies bowel or bladder incontinence, denies numbness between her legs  On physical examination she has tenderness of the right buttocks area on palpation, she has positive straight leg raising test on the right side  Her right foot dorsiflexion is mildly decreased compared to left 1 but she feels that is due to the pain in the low back  Her muscle tone and muscle bulk are normal   Her knee and ankle jerks are 2/4 equal   She walks with an antalgic gait    She most likely has acute low back pain with radiculopathy from spondylosis or degenerative disc disease  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  Diagnoses and all orders for this visit:    Acute right-sided low back pain with right-sided sciatica  -     Ambulatory Referral to [facility]; Future  -     methylPREDNISolone 4 MG tablet therapy pack; Use as directed on package  -     methocarbamol (ROBAXIN) 500 mg tablet; Take 1 tablet (500 mg total) by mouth 3 (three) times a day    Other orders  -     NON FORMULARY; Cspsules--take 1 at bedtime  -     famotidine (PEPCID) 20 mg tablet; 1 as needed for break through          Subjective:      Patient ID: Miah Miranda is a 62 y o  female        HPI    Patient presents for follow-up of chronic conditions as detailed in the assessment and plan  The following portions of the patient's history were reviewed and updated as appropriate: current medications, past family history, past medical history, past social history, past surgical history and problem list     Review of Systems      Objective:    /74   Pulse 79   Temp 98 5 °F (36 9 °C) (Tympanic)   Resp 16   Ht 5' 5" (1 651 m)   Wt 83 9 kg (185 lb)   LMP  (LMP Unknown)   SpO2 98%   BMI 30 79 kg/m²      Physical Exam  Constitutional:       General: She is in acute distress (And discomfort from the low back pain)  HENT:      Head: Normocephalic  Musculoskeletal:         General: Tenderness present  Neurological:      Mental Status: She is alert and oriented to person, place, and time  Sensory: No sensory deficit  Motor: Weakness present        Gait: Gait abnormal       Deep Tendon Reflexes: Reflexes normal    Psychiatric:         Mood and Affect: Mood normal              Jean Snaford MD

## 2023-02-08 NOTE — ASSESSMENT & PLAN NOTE
Patient developed acute low back pain on the right side with radiation to the right groin and to the right posterolateral thigh after bending forward yesterday in the morning to pick something up on the floor  She has continuous low back pain which is worse when she sits or lays down  Somewhat better when she stands  She tried ibuprofen 800 mg twice a day to the edge off of the discomfort  She denies bowel or bladder incontinence, denies numbness between her legs  On physical examination she has tenderness of the right buttocks area on palpation, she has positive straight leg raising test on the right side  Her right foot dorsiflexion is mildly decreased compared to left 1 but she feels that is due to the pain in the low back  Her muscle tone and muscle bulk are normal   Her knee and ankle jerks are 2/4 equal   She walks with an antalgic gait    She most likely has acute low back pain with radiculopathy from spondylosis or degenerative disc disease  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

## 2023-02-09 ENCOUNTER — EVALUATION (OUTPATIENT)
Dept: PHYSICAL THERAPY | Facility: CLINIC | Age: 58
End: 2023-02-09

## 2023-02-09 DIAGNOSIS — M54.41 ACUTE RIGHT-SIDED LOW BACK PAIN WITH RIGHT-SIDED SCIATICA: ICD-10-CM

## 2023-02-09 NOTE — PROGRESS NOTES
PT Evaluation     Today's date: 2023  Patient name: Sona Hopkins  : 1965  MRN: [id number]  Referring provider: Pool Starks MD  Dx:   Encounter Diagnosis     ICD-10-CM    1  Acute right-sided low back pain with right-sided sciatica  [id number] Ambulatory Referral to [facility]                     Assessment  Assessment details: Sona Hopkins is a 62 y o  female presenting to outpatient physical therapy at [place] [address] with complaints of R LBP with posterior R LE pain and R groin pain   She is having the most difficulty when walking and sitting due to pain   Patient was referred to PT through [place] [address]   She would benefit from skilled PT services with focus on manual treatment to address her hypomobility issues, stretching and core/postural strengthening in order to return to all normal activities and reach maximum level of function       No further referral appears necessary at this time based upon examination results  Primary movement impairment diagnosis of hypomobility resulting in pathoanatomical symptoms of lumbar dysfunction due to lumbar DDD  Impairments include:    1   Pain in endrange lumbar extension - addressing with mobs and flexibility  2   Decreased postural and core strength - addressing with HEP strengthening  3   Limited flexibility - addressing with HEP  4   Poor postural awareness and body mechanics - addressing with education    Impairments: abnormal gait, abnormal or restricted ROM, activity intolerance, impaired balance, impaired physical strength, lacks appropriate home exercise program, pain with function and poor posture   Barriers to therapy: None  Understanding of Dx/Px/POC: excellent   Prognosis: good    Goals  ST  Independent with HEP in 2 weeks  2  Increase lumbar AROM to WNL all motions in 3 weeks     LT  Achieve FOTO score of 64/100 in 4 weeks   2  Able to RTW, perform all housework without LB or R LE pain in 4 weeks  3  Strength R LE = 5/5 all motions, abdominals = 4/5 in 4 weeks  4  Normal R LE nerve glides in 4 weeks    Plan  Patient would benefit from: skilled PT and PT eval  Planned modality interventions: cryotherapy, TENS and thermotherapy: hydrocollator packs  Planned therapy interventions: abdominal trunk stabilization, ADL retraining, balance/weight bearing training, body mechanics training, flexibility, functional ROM exercises, home exercise program, joint mobilization, manual therapy, neuromuscular re-education, postural training, strengthening, stretching, therapeutic activities and therapeutic exercise  Frequency: 2x week  Duration in weeks: 4  Plan of Care beginning date: 2023  Plan of Care expiration date: 3/11/2023  Treatment plan discussed with: patient        Subjective Evaluation    History of Present Illness  Mechanism of injury: Pt reports having acute R LBP with radiation to the right groin and to the right posterolateral thigh after bending forward on 23 to pick something up from the floor  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, 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  Working in nursing x 34 years  Has hx of lumbar spondylosis, DDD  Started a Medrol Dosepak today  Robaxin has not changed her pain  She also has hx of L UE weakness from a cervical surgery  Not a recurrent problem   Quality of life: good    Pain  Current pain ratin  At best pain ratin  At worst pain rating: 10  Quality: tight, sharp and radiating  Alleviating factors: supine or L S/L    Aggravating factors: walking, lifting and sitting  Progression: no change    Social Support  Lives with: spouse and adult children    Employment status: not working    Diagnostic Tests  No diagnostic tests performed  Treatments  Previous treatment: medication  Current treatment: medication  Patient Goals  Patient goals for therapy: increased strength, return to sport/leisure activities, independence with ADLs/IADLs, return to work, increased motion, improved balance and decreased pain          Objective     Concurrent Complaints  Positive for disturbed sleep  Negative for bladder dysfunction and bowel dysfunction    Postural Observations  Seated posture: fair  Standing posture: fair  Correction of posture: makes symptoms better        Palpation   Left   No palpable tenderness to the erector spinae  Right   Hypertonic in the erector spinae  Tenderness of the erector spinae  Trigger point to erector spinae  Cervical Spine Comments  Right erector spinae: severe tightness R upper glut  Tenderness     Lumbar Spine  Tenderness in the facet joint (R L4-5, L5-S1)  No tenderness in the spinous process       Neurological Testing     Sensation     Lumbar   Left   Intact: light touch    Right   Paresthesia: light touch    Comments   Right light touch: posterior R thigh    Active Range of Motion     Lumbar   Flexion:  Restriction level: moderate  Extension:  with pain Restriction level: maximal  Left lateral flexion:  Restriction level: moderate  Right lateral flexion:  Restriction level: moderate  Left rotation:  with pain Restriction level: moderate  Right rotation:  Restriction level: moderate  Mechanical Assessment    Cervical      Thoracic      Lumbar    Standing flexion:   Pain location:no change  Lying flexion:   Pain location: no change  Pain intensity: better  Standing extension:   Pain location: peripheralized  Pain intensity: worse  Lying extension:   Pain location: peripheralized  Pain intensity: worse    Strength/Myotome Testing     Left Hip   Planes of Motion   Flexion: 5  Abduction: 5  Adduction: 5    Right Hip   Planes of Motion   Flexion: 4+  Abduction: 4+  Adduction: 5    Left Knee   Flexion: 5  Extension: 5    Right Knee   Flexion: 4-  Extension: 4-    Left Ankle/Foot   Dorsiflexion: 5  Plantar flexion: 5  Great toe extension: 5    Right Ankle/Foot   Dorsiflexion: 4-  Plantar flexion: 4  Great toe extension: 5    Additional Strength Details  Abdominals = 2/5    Tests   Cervical   Positive vertical compression  Lumbar   Positive vertical compression   Negative prone instability   Left   Negative passive SLR and slump test      Right   Positive passive SLR and slump test      Ambulation     Ambulation: Level Surfaces   Ambulation without assistive device: independent    Ambulation: Stairs   Ascend stairs: independent  Pattern: non-reciprocal  Railings: one rail  Descend stairs: independent  Pattern: non-reciprocal  Railings: one rail  Curbs: unable    Observational Gait   Gait: antalgic   Decreased walking speed and right stance time           POC EXPIRES On:  3/11/23  PRECAUTIONS:  None  CO-MORBIDITES:  L cervical radic  PERSONAL FACTORS:  Nurse      Manuals HEP 2/9           STM R upper glut in L S/L   5'           Lower lumbar rotational mobs in L S/L   2'           L S/L lower lumbar gapping  3'                        Neuro Re-Ed     Seated R LE nerve sliders 2/9 10           Supine PPT 2/9 5" 10                                                                            Ther Ex    Supine strap R SKTC 2/9 10" 5           Supine R strap piriformis stretch 2/9 10" 5                                                                                         Ther Activity                              Gait Training                              Modalities

## 2023-02-14 ENCOUNTER — OFFICE VISIT (OUTPATIENT)
Dept: PHYSICAL THERAPY | Facility: CLINIC | Age: 58
End: 2023-02-14

## 2023-02-14 DIAGNOSIS — M54.41 ACUTE RIGHT-SIDED LOW BACK PAIN WITH RIGHT-SIDED SCIATICA: Primary | ICD-10-CM

## 2023-04-04 ENCOUNTER — APPOINTMENT (OUTPATIENT)
Dept: LAB | Facility: HOSPITAL | Age: 58
End: 2023-04-04

## 2023-04-04 DIAGNOSIS — E55.9 AVITAMINOSIS D: ICD-10-CM

## 2023-04-04 DIAGNOSIS — E34.9 ENDOCRINE DISORDER RELATED TO PUBERTY: ICD-10-CM

## 2023-04-04 LAB
25(OH)D3 SERPL-MCNC: 26 NG/ML (ref 30–100)
ALBUMIN SERPL BCP-MCNC: 3.4 G/DL (ref 3.5–5)
ALP SERPL-CCNC: 72 U/L (ref 46–116)
ALT SERPL W P-5'-P-CCNC: 25 U/L (ref 12–78)
ANION GAP SERPL CALCULATED.3IONS-SCNC: 2 MMOL/L (ref 4–13)
AST SERPL W P-5'-P-CCNC: 23 U/L (ref 5–45)
BILIRUB SERPL-MCNC: 0.45 MG/DL (ref 0.2–1)
BUN SERPL-MCNC: 15 MG/DL (ref 5–25)
CALCIUM ALBUM COR SERPL-MCNC: 9.5 MG/DL (ref 8.3–10.1)
CALCIUM SERPL-MCNC: 9 MG/DL (ref 8.3–10.1)
CHLORIDE SERPL-SCNC: 109 MMOL/L (ref 96–108)
CO2 SERPL-SCNC: 28 MMOL/L (ref 21–32)
CREAT SERPL-MCNC: 0.8 MG/DL (ref 0.6–1.3)
GFR SERPL CREATININE-BSD FRML MDRD: 82 ML/MIN/1.73SQ M
GLUCOSE SERPL-MCNC: 84 MG/DL (ref 65–140)
PHOSPHATE SERPL-MCNC: 3.1 MG/DL (ref 2.7–4.5)
POTASSIUM SERPL-SCNC: 4 MMOL/L (ref 3.5–5.3)
PROT SERPL-MCNC: 6.3 G/DL (ref 6.4–8.4)
PTH-INTACT SERPL-MCNC: 60.5 PG/ML (ref 18.4–80.1)
SODIUM SERPL-SCNC: 139 MMOL/L (ref 135–147)
T4 FREE SERPL-MCNC: 1.2 NG/DL (ref 0.76–1.46)
TSH SERPL DL<=0.05 MIU/L-ACNC: 0.36 UIU/ML (ref 0.45–4.5)

## 2023-05-02 ENCOUNTER — OFFICE VISIT (OUTPATIENT)
Dept: FAMILY MEDICINE CLINIC | Facility: HOSPITAL | Age: 58
End: 2023-05-02

## 2023-05-02 VITALS
HEART RATE: 68 BPM | BODY MASS INDEX: 31.75 KG/M2 | WEIGHT: 190.6 LBS | DIASTOLIC BLOOD PRESSURE: 60 MMHG | HEIGHT: 65 IN | OXYGEN SATURATION: 98 % | SYSTOLIC BLOOD PRESSURE: 120 MMHG

## 2023-05-02 DIAGNOSIS — E66.09 CLASS 1 OBESITY DUE TO EXCESS CALORIES WITHOUT SERIOUS COMORBIDITY WITH BODY MASS INDEX (BMI) OF 31.0 TO 31.9 IN ADULT: ICD-10-CM

## 2023-05-02 DIAGNOSIS — G89.4 CHRONIC PAIN SYNDROME: ICD-10-CM

## 2023-05-02 DIAGNOSIS — E03.8 HYPOTHYROIDISM DUE TO HASHIMOTO'S THYROIDITIS: ICD-10-CM

## 2023-05-02 DIAGNOSIS — G43.009 MIGRAINE WITHOUT AURA AND WITHOUT STATUS MIGRAINOSUS, NOT INTRACTABLE: ICD-10-CM

## 2023-05-02 DIAGNOSIS — Z12.31 ENCOUNTER FOR SCREENING MAMMOGRAM FOR MALIGNANT NEOPLASM OF BREAST: ICD-10-CM

## 2023-05-02 DIAGNOSIS — I47.1 SUPRAVENTRICULAR TACHYCARDIA (HCC): ICD-10-CM

## 2023-05-02 DIAGNOSIS — H35.52 RETINITIS PIGMENTOSA: ICD-10-CM

## 2023-05-02 DIAGNOSIS — E06.3 HYPOTHYROIDISM DUE TO HASHIMOTO'S THYROIDITIS: ICD-10-CM

## 2023-05-02 DIAGNOSIS — C82.31 GRADE 3A FOLLICULAR LYMPHOMA OF LYMPH NODES OF NECK (HCC): Primary | ICD-10-CM

## 2023-05-02 DIAGNOSIS — Q99.8: ICD-10-CM

## 2023-05-02 DIAGNOSIS — M47.22 OSTEOARTHRITIS OF SPINE WITH RADICULOPATHY, CERVICAL REGION: ICD-10-CM

## 2023-05-02 DIAGNOSIS — F32.9 REACTIVE DEPRESSION: ICD-10-CM

## 2023-05-02 DIAGNOSIS — C82.39 GRADE 3A FOLLICULAR LYMPHOMA OF EXTRANODAL SITE EXCLUDING SPLEEN AND OTHER SOLID ORGANS (HCC): ICD-10-CM

## 2023-05-02 PROBLEM — E83.52 HYPERCALCEMIA: Status: RESOLVED | Noted: 2022-01-18 | Resolved: 2023-05-02

## 2023-05-02 PROBLEM — E66.811 CLASS 1 OBESITY WITH BODY MASS INDEX (BMI) OF 31.0 TO 31.9 IN ADULT: Status: ACTIVE | Noted: 2022-07-06

## 2023-05-02 PROBLEM — E04.9 GOITER: Status: RESOLVED | Noted: 2019-01-15 | Resolved: 2023-05-02

## 2023-05-02 PROBLEM — E03.9 HYPOTHYROIDISM: Status: RESOLVED | Noted: 2020-05-01 | Resolved: 2023-05-02

## 2023-05-02 PROBLEM — C82.99: Status: ACTIVE | Noted: 2022-08-02

## 2023-05-02 PROBLEM — E66.9 CLASS 1 OBESITY WITH BODY MASS INDEX (BMI) OF 31.0 TO 31.9 IN ADULT: Status: ACTIVE | Noted: 2022-07-06

## 2023-05-02 RX ORDER — DULOXETIN HYDROCHLORIDE 20 MG/1
20 CAPSULE, DELAYED RELEASE ORAL DAILY
Qty: 30 CAPSULE | Refills: 3 | Status: SHIPPED | OUTPATIENT
Start: 2023-05-02

## 2023-05-02 NOTE — ASSESSMENT & PLAN NOTE
Ongoing issue- sees cardiology- Dr Shelbi Hou with [facility] - had stress test last yeargets brief palpitiation about 2 x week- a few  seconds - will do a valsalva

## 2023-05-02 NOTE — ASSESSMENT & PLAN NOTE
Having worsening headaches with eye issues is being addressed at [facility] Dr Ashkan Goldstein- has  Had extensive testing- has retinal hereditary cone issues

## 2023-05-02 NOTE — PROGRESS NOTES
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)  C82 31       2  Migraine without aura and without status migrainosus, not intractable  G43 009 MRI cervical spine wo contrast     Ambulatory Referral to Neurology     DULoxetine (CYMBALTA) 20 mg capsule      3  Osteoarthritis of spine with radiculopathy, cervical region  M47 22 MRI cervical spine wo contrast      4  Class 1 obesity due to excess calories without serious comorbidity with body mass index (BMI) of 31 0 to 31 9 in adult  E66 09     Z68 31       5  Grade 3a follicular lymphoma of extranodal site excluding spleen and other solid organs (HCC)  C82 39       6  Hypothyroidism due to Hashimoto's thyroiditis  E03 8     E06 3       7  Retinitis pigmentosa  H35 52       8  Chronic pain syndrome  G89 4 DULoxetine (CYMBALTA) 20 mg capsule      9  Reactive depression  F32 9 DULoxetine (CYMBALTA) 20 mg capsule      10  Encounter for screening mammogram for malignant neoplasm of breast  Z12 31 Mammo screening bilateral w 3d & cad      11  Supraventricular tachycardia (HCC)  I47 1       12   Enhanced S-cone syndrome  Q99 8           Problem List Items Addressed This Visit        Endocrine    Hypothyroidism due to Hashimoto's thyroiditis     Had adjustment - now taking no pills on Sunday- Dr Carlene Antonio following            Cardiovascular and Mediastinum    Migraine without aura and without status migrainosus, not intractable     Having worsening headaches with eye issues is being addressed at [facility] Dr Segro Chinchilla- has  Had extensive testing- has retinal hereditary cone issues         Relevant Medications    DULoxetine (CYMBALTA) 20 mg capsule    Other Relevant Orders    MRI cervical spine wo contrast    Ambulatory Referral to Neurology    Supraventricular tachycardia [facility])      Ongoing issue- sees cardiology- Dr Wright Mediate with [facility] - had stress test last yeargets brief palpitiation about 2 x week- a few  seconds - will do a valsalva            Other    Retinitis pigmentosa    Enhanced S-cone syndrome    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) - Primary     Seen last month with right sided pain in face- had scans- will be seeing ent for evaluation         Class 1 obesity with body mass index (BMI) of 31 0 to 87 5 in adult    Follicular lymphoma of extranodal site excluding spleen and other solid organs [facility])   Other Visit Diagnoses     Osteoarthritis of spine with radiculopathy, cervical region        Relevant Orders    MRI cervical spine wo contrast    Chronic pain syndrome        Relevant Medications    DULoxetine (CYMBALTA) 20 mg capsule    Reactive depression        Relevant Medications    DULoxetine (CYMBALTA) 20 mg capsule    Encounter for screening mammogram for malignant neoplasm of breast        Relevant Orders    Mammo screening bilateral w 3d & cad            Return in about 6 months (around 11/2/2023)  Subjective:    Patient ID: Ellen Aguilar is a 62 y o  female    1  Neck pain- at computer 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- has done PT  After her radiation to neck for lymphoma- is doing those exercises- is having right arm numbness and severe pain on right side of neck which triggers migraine- will order mri- had recent ct scan for cancer evaluation  2  Migraines - uses zomig- will decreases but not improved and will trigger worsening- has marijuan pill- ongoing pins and needles on  Left side after surgery and radiation  Had right nasal pain and ongoing pain which is being evlauted by shikha to her oncology team  Having daily migraines on work days triggerred by neck pain  3   Depression-worsening with pain issues and headaches have worsened symptoms- wakens in middle of night with migraine   had been on lyrica and gabapenitn   will fill out for fmla      The following portions of the patient's history were reviewed and updated as appropriate: allergies, current medications and problem list      Review of Systems   Constitutional: Negative for fatigue  HENT: Negative for congestion and postnasal drip  Respiratory: Negative for cough  Musculoskeletal: Positive for myalgias, neck pain and neck stiffness  Skin: Negative for wound  Neurological: Positive for headaches  Psychiatric/Behavioral: Positive for dysphoric mood  Negative for agitation, confusion and decreased concentration  All other systems reviewed and are negative          Objective:      Current Outpatient Medications:     calcium carbonate (Tums Ultra 1000) 1000 MG chewable tablet, Takes 1 as needed, Disp: , Rfl:     DULoxetine (CYMBALTA) 20 mg capsule, Take 1 capsule (20 mg total) by mouth daily, Disp: 30 capsule, Rfl: 3    estradiol (ESTRACE) 0 5 MG tablet, Take 0 5 mg by mouth daily , Disp: , Rfl:     famotidine (PEPCID) 20 mg tablet, 1 as needed for break through, Disp: , Rfl:     ibuprofen (MOTRIN) 200 mg tablet, Take 600 mg by mouth every 6 (six) hours as needed for mild pain, Disp: , Rfl:     levothyroxine 125 mcg tablet, Take 1 tablet 6 days a week and 1/2 tablet on sunday (Patient taking differently: Take 1 tablet 6 days a week), Disp: 90 tablet, Rfl: 3    Lutein 20 MG CAPS, Take by mouth daily, Disp: , Rfl:     Multiple Vitamins-Minerals (MULTIVITAMIN GUMMIES WOMENS) CHEW, Chew 2 each daily, Disp: , Rfl:     NON FORMULARY, Cspsules--take 1 at bedtime, Disp: , Rfl:     omeprazole (PriLOSEC) 40 MG capsule, Take 1 capsule (40 mg total) by mouth daily, Disp: 90 capsule, Rfl: 12    verapamil (CALAN-SR) 120 mg CR tablet, Take 120 mg by mouth, Disp: , Rfl:     VITAMIN D, ERGOCALCIFEROL, PO, Take 5,000 Units by mouth daily, Disp: , Rfl:     ZOLMitriptan (ZOMIG-ZMT) 2 5 MG disintegrating tablet, Take 1 tablet (2 5 mg total) by mouth daily as needed for migraine, Disp: 18 tablet, Rfl: 3    methocarbamol (ROBAXIN) 500 mg tablet, Take 1 tablet (500 mg total) by mouth 3 (three) times a day, Disp: 15 tablet, Rfl: "1    Blood pressure 120/60, pulse 68, height 5' 5\" (1 651 m), weight 86 5 kg (190 lb 9 6 oz), SpO2 98 %  Physical Exam  Vitals and nursing note reviewed  Constitutional:       General: She is not in acute distress  Appearance: She is not ill-appearing  HENT:      Head: Normocephalic  Right Ear: Tympanic membrane normal  There is no impacted cerumen  Left Ear: Tympanic membrane normal  There is no impacted cerumen  Nose: No congestion or rhinorrhea  Mouth/Throat:      Mouth: Mucous membranes are dry  Pharynx: No oropharyngeal exudate or posterior oropharyngeal erythema  Eyes:      General:         Right eye: No discharge  Left eye: No discharge  Pupils: Pupils are equal, round, and reactive to light  Neck:      Vascular: No carotid bruit  Comments: Tender at base of occiput on rigth  Cardiovascular:      Rate and Rhythm: Normal rate and regular rhythm  Heart sounds: No murmur heard  Pulmonary:      Effort: No respiratory distress  Breath sounds: No rhonchi  Abdominal:      Palpations: Abdomen is soft  Tenderness: There is no abdominal tenderness  Musculoskeletal:         General: No tenderness  Cervical back: Rigidity present  Skin:     Findings: No erythema or rash  Neurological:      General: No focal deficit present  Mental Status: She is alert  Motor: No weakness  Psychiatric:         Thought Content:  Thought content normal          Judgment: Judgment normal         "

## 2023-05-08 ENCOUNTER — HOSPITAL ENCOUNTER (OUTPATIENT)
Dept: MRI IMAGING | Facility: HOSPITAL | Age: 58
Discharge: HOME | End: 2023-05-08
Attending: INTERNAL MEDICINE

## 2023-05-08 DIAGNOSIS — G43.009 MIGRAINE WITHOUT AURA AND WITHOUT STATUS MIGRAINOSUS, NOT INTRACTABLE: ICD-10-CM

## 2023-05-08 DIAGNOSIS — M47.22 OSTEOARTHRITIS OF SPINE WITH RADICULOPATHY, CERVICAL REGION: ICD-10-CM

## 2023-05-12 DIAGNOSIS — M47.22 OSTEOARTHRITIS OF SPINE WITH RADICULOPATHY, CERVICAL REGION: Primary | ICD-10-CM

## 2023-06-06 ENCOUNTER — OFFICE VISIT (OUTPATIENT)
Dept: NEUROSURGERY | Facility: CLINIC | Age: 58
End: 2023-06-06
Payer: COMMERCIAL

## 2023-06-06 VITALS
HEART RATE: 72 BPM | HEIGHT: 65 IN | TEMPERATURE: 98.3 F | DIASTOLIC BLOOD PRESSURE: 68 MMHG | WEIGHT: 188 LBS | OXYGEN SATURATION: 94 % | SYSTOLIC BLOOD PRESSURE: 120 MMHG | BODY MASS INDEX: 31.32 KG/M2

## 2023-06-06 DIAGNOSIS — M54.2 MYOFASCIAL NECK PAIN: Primary | ICD-10-CM

## 2023-06-06 DIAGNOSIS — M47.22 OSTEOARTHRITIS OF SPINE WITH RADICULOPATHY, CERVICAL REGION: ICD-10-CM

## 2023-06-06 DIAGNOSIS — M54.12 RADICULOPATHY, CERVICAL: ICD-10-CM

## 2023-06-06 PROCEDURE — 99204 OFFICE O/P NEW MOD 45 MIN: CPT | Performed by: NURSE PRACTITIONER

## 2023-06-06 NOTE — PROGRESS NOTES
Assessment/Plan:    Radiculopathy, cervical  · As addressed in HPI  · mJOA -16, need hand rail on stairs ,   · No bowel (retention ) or bladder (incontience) dysfunction, no gait instability  · CTS assessment Tinel's negative , Phalen positive , right hand describes symptome of CTS ---  · Horowitz -negative    Imagining  5/8/23 MRI cervical Multilevel cervical spondylosis, as described above  Multifactorial disease results in severe left foraminal narrowing at C5-C6 and C6-C7  Minimal flattening of the left ventral aspect of the cord is present at C5-C6  Plan   · Reviewed MRI cervical  · Ordered EMG RUE   · RTO or virtual visit after EMG completion   · Referral to pain management -Dr Beto Leger  Please initiate multimodal treatment    · Referral to physical therapy Evaluate and Treat Cervical radiculopathy , cervical paraspinal pain soreness  , myofascial pain upper cervical trapezius area,   Message   Heat, ergonomics assessment of work setting ---core strengthening cervical         Radiculopathy, cervical    Osteoarthritis of spine with radiculopathy, cervical region  -     Ambulatory Referral to Neurosurgery        Subjective: Referral from Dr Lisa Felix ,PCP, cervical radiculopathy , cervicalgia , headaches  Patient ID: Janine Suresh is a 62 y o  female     HPI        [address] [facility]  LOCATION/SYMPTOMS: --posterior cervical pain with distribution up into occipital area and into parietal  And frontal area  , posterior neck pain distribution into the right shoulder  And up[per deltoid area    She also reports  right lower forearm pain in the right thumb, thenar eminence up to mid right forearm to near elbow  Reports left sided spinal assesotry nerve damage after lymph node dissection for lymphoma  She reports  has right sided migraine headaches tat start in the right sinus   Has history of chronic visual issues that also cause headaches, Enhanced S Cone syndrome (ESCS)  and retinitis pigmentosa  ONSET: DURATION: ---neck pain into head worsened  Chronic symptoms that worsened in 2020, HO years of cervical pain form martinez into shoulders and into right upper deltoid  In 2021 started with symptoms in the right wrist up into mid forearm  INCITING EVENT: 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 [facility]  CHARACTER/QUALITY: pins and needles in arm when holding anything with substantial weight  10 lbs or greater  Numbness in the arms, cramping at the base of neck , burring right side of cervical area across traps  Shooting pain up into base of skull with distribution of tightness and cramp into right shoulder to deltoid area, right lower arm and hand has burning, shooting pain when using of mouse and keyboard  Holding any heavy object 10 lbs or greater start with pain and numbness in the entire arm  SEVERITY OF SYMPTOMS: 7-8/10     AGGRAVATING FACTORS :  ---cervical flexion and extension  Cause pain in the posteriuor cervical down below between scapula  Cervical rotation side side pulling in the neck ,uing the mouse , tension of the shoulders while typing , --- lifting up and extension of the arms     RELIEVING FACTORS:    nothing     PREVIOUS SPINAL SURGERY: never , cervical lymph node dissection --- lymphoma May 2020  Radiation and  Biological Retuxin     MULTIMODAL TREATMENTS:  · PT --none   · PM --- none   · PM --specific for deficits that resulted in left arm status post lymphoma surgery cervical lympg node dissection, surgery at [facility]  Has resulted stiffness left cervical area,has nerve damage distribution inbto left face , neck and left arm  · Medicinal-  medical Abby Parnell, has licence , membrane stabilizer cannot tolerate had adverse effects of edema  Ibuprofem 600-800 mg prn    · PCP --- prescribes headache medication, Dr Angelika Dillard ordered duloxetine   In the remote past treated with neurologist, ordered Zomig, now PCP continue to order  · Home ergonomic desk ,  Special reading prescription glasses     SOCIAL:FT nurse ----Gabriel Roberts , 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 of shift ,   Children age 39, 29 ,  to a nurse critical care nurse at [facility]       Right hand dominate ------    REVIEW OF SYSTEMS  Review of Systems   Eyes: Positive for visual disturbance (bad vision )  Gastrointestinal: Negative  Surgery on rectum after child birth 2010  Endocrine:        Lymp node removal left side of neck   Suffered from nerve damage in her left arm from the surgery    Genitourinary: Negative  Musculoskeletal: Positive for gait problem (tripped down stairs ), myalgias (muscle pain/ spams /tightness/burning in her neck and right shoulder ), neck pain (radiates into the back of her head and down into her right shoulder) and neck stiffness  Pain and burning sensation in her neck and down into her shoulder       Neck pain for several years, worse within oct 2020  Completed PT at [facility] 2020 for her left arm   No injections  No chiropractor   No pervious spinal surgery    Neurological: Positive for weakness (bilateral arms weakness from the numbness ), numbness (bilateral arm numbness ) and headaches (constant , hx of migraines )  Negative for dizziness, tremors and seizures  Psychiatric/Behavioral: Positive for sleep disturbance (sleeps with a special pillow )           Meds/Allergies     Current Outpatient Medications   Medication Sig Dispense Refill   • calcium carbonate (Tums Ultra 1000) 1000 MG chewable tablet Takes 1 as needed     • DULoxetine (CYMBALTA) 20 mg capsule TAKE 1 CAPSULE BY MOUTH EVERY DAY 90 capsule 0   • estradiol (ESTRACE) 0 5 MG tablet Take 0 5 mg by mouth daily      • famotidine (PEPCID) 20 mg tablet 1 as needed for break through     • ibuprofen (MOTRIN) 200 mg tablet Take 600 mg by mouth every 6 (six) hours as needed for mild pain     • levothyroxine 125 mcg tablet Take 1 tablet 6 days a week and 1/2 tablet on sunday (Patient taking differently: Take 1 tablet 6 days a week) 90 tablet 3   • Lutein 20 MG CAPS Take by mouth daily     • Multiple Vitamins-Minerals (MULTIVITAMIN GUMMIES WOMENS) CHEW Chew 2 each daily     • NON FORMULARY Cspsules--take 1 at bedtime     • omeprazole (PriLOSEC) 40 MG capsule Take 1 capsule (40 mg total) by mouth daily 90 capsule 12   • verapamil (CALAN-SR) 120 mg CR tablet Take 120 mg by mouth     • VITAMIN D, ERGOCALCIFEROL, PO Take 5,000 Units by mouth daily     • ZOLMitriptan (ZOMIG-ZMT) 2 5 MG disintegrating tablet Take 1 tablet (2 5 mg total) by mouth daily as needed for migraine 18 tablet 3     No current facility-administered medications for this visit  Allergies   Allergen Reactions   • Latex Rash and Hives   • Iodinated Contrast Media Sneezing     Sneezing, tongue tingling  Sneezing, tongue tingling     • Gabapentin Edema   • Lyrica [Pregabalin] Edema   • Other      Other reaction(s): hair dye causes rash  Orange coating   • Rituximab Itching and Other (See Comments)     Felt hot all over and restless   • Lansoprazole Rash       PAST HISTORY    Past Medical History:   Diagnosis Date   • [facility] )     non-hogikins lymphoma    • Colon polyp    • Convergence insufficiency     bilateral eyes     • COVID-19 01/2022   • Disease of thyroid gland    • Endometrial hyperplasia    • Enhanced S-cone syndrome     eyes   • Follicular lymphoma ([facility] ) 04/2020   • GERD (gastroesophageal reflux disease)    • Irregular heart beat     SVT   • Non Hodgkin's lymphoma [facility] ) 2020    stage 1, had surgery and XRT to the neck( finished july 2020), rituximab in falll 2022 for recurrence in sinus   • Overweight    • Retinitis pigmentosa        Past Surgical History:   Procedure Laterality Date   • ANAL SPHINCTEROPLASTY     • ANAL/SPHINCTEROPLASTY     • ANTERIOR AND POSTERIOR VAGINAL REPAIR     • BLADDER SUSPENSION     • COLONOSCOPY     • DILATION AND "CURETTAGE OF UTERUS      X 2   • HYSTERECTOMY  2014   • OOPHORECTOMY Bilateral 2014    with hysterectomy   • TONSILLECTOMY AND ADENOIDECTOMY     • UPPER GASTROINTESTINAL ENDOSCOPY     • US GUIDED LYMPH NODE BIOPSY LEFT  04/21/2020       Social History     Tobacco Use   • Smoking status: Never   • Smokeless tobacco: Never   Vaping Use   • Vaping Use: Never used   Substance Use Topics   • Alcohol use: Yes     Comment: rare   • Drug use: Yes     Types: Marijuana     Comment: help to sleep, medical marijuana card       Family History   Problem Relation Age of Onset   • Cancer Mother         lung   • COPD Father    • Diabetes type II Brother         on insulin   • Substance Abuse Sister    • HIV Sister    • No Known Problems Daughter    • No Known Problems Maternal Grandmother    • No Known Problems Paternal Grandmother    • Throat cancer Maternal Grandfather    • Other Maternal Grandfather         Aortic valve replacement    • No Known Problems Paternal Grandfather    • Mental illness Neg Hx    • Colon cancer Neg Hx    • Colon polyps Neg Hx        The following portions of the patient's history were reviewed and updated as appropriate: allergies, current medications, past family history, past medical history, past social history, past surgical history and problem list       EXAM    Vitals:Blood pressure 120/68, pulse 72, temperature 98 3 °F (36 8 °C), temperature source Temporal, height 5' 5\" (1 651 m), weight 85 3 kg (188 lb), SpO2 94 %  ,Body mass index is 31 28 kg/m²  Physical Exam  Vitals and nursing note reviewed  Exam conducted with a chaperone present ()  Constitutional:       Appearance: Normal appearance  Pulmonary:      Effort: Pulmonary effort is normal  No respiratory distress  Breath sounds: Normal breath sounds  Musculoskeletal:      Right lower leg: No edema  Left lower leg: No edema  Neurological:      Mental Status: She is alert and oriented to person, place, and time        " Sensory: No sensory deficit  Motor: No weakness  Coordination: Coordination normal       Gait: Gait is intact  Gait normal       Deep Tendon Reflexes: Reflexes normal       Reflex Scores:       Tricep reflexes are 2+ on the right side  Bicep reflexes are 2+ on the right side  Brachioradialis reflexes are 2+ on the right side  Patellar reflexes are 2+ on the right side and 2+ on the left side  Achilles reflexes are 2+ on the right side and 2+ on the left side  Psychiatric:         Mood and Affect: Mood normal          Behavior: Behavior normal          Neurologic Exam     Mental Status   Oriented to person, place, and time  Level of consciousness: alert    Motor Exam   Right arm tone: normal  Left arm tone: decreased    Strength   Right deltoid: 5/5  Left deltoid: 5/5  Right biceps: 5/5  Left biceps: 5/5  Right triceps: 5/5  Left triceps: 5/5  Right wrist flexion: 5/5  Left wrist flexion: 5/5  Right wrist extension: 5/5  Left wrist extension: 5/5  Right interossei: 5/5  Left interossei: 5/5    Sensory Exam   Light touch normal      Gait, Coordination, and Reflexes     Gait  Gait: normal    Reflexes   Right brachioradialis: 2+  Right biceps: 2+  Right triceps: 2+  Right patellar: 2+  Left patellar: 2+  Right achilles: 2+  Left achilles: 2+  Right Horowitz: absent  Left Horowitz: absent      Imaging Studies  MRI cervical spine wo contrast    Result Date: 5/11/2023  Narrative: MRI CERVICAL SPINE WITHOUT CONTRAST INDICATION: G43 009: Migraine without aura, not intractable, without status migrainosus M47 22: Other spondylosis with radiculopathy, cervical region  COMPARISON:  None  TECHNIQUE:  Multiplanar, multisequence imaging of the cervical spine was performed    IMAGE QUALITY:  Diagnostic FINDINGS: ALIGNMENT: Retrolisthesis of C4-C5  Atlantodental distance is preserved  Craniocervical junction is unremarkable   MARROW SIGNAL:  Normal marrow signal is identified within the visualized bony structures  No discrete marrow lesion  CERVICAL AND VISUALIZED THORACIC CORD:  Normal signal within the visualized cord  PREVERTEBRAL AND PARASPINAL SOFT TISSUES:  Normal  VISUALIZED POSTERIOR FOSSA:  The visualized posterior fossa demonstrates no abnormal signal  CERVICAL DISC SPACES: C2-C3:  Normal  C3-C4:  Normal  C4-C5: Disc osteophyte complex with uncovertebral spurring  Facet arthrosis  Mild canal stenosis  Moderate bilateral foraminal narrowing  C5-C6: Disc osteophyte complex with uncovertebral spurring, asymmetric to the left  Severe left foraminal narrowing  Mild right foraminal narrowing  Mild canal stenosis with minimal flattening of the left aspect of the cord  C6-C7: Disc osteophyte complex with uncovertebral spurring, asymmetric to the left  No significant canal stenosis  Severe left foraminal narrowing  C7-T1:  Normal  UPPER THORACIC DISC SPACES:  Normal  OTHER FINDINGS:  None  Impression: Multilevel cervical spondylosis, as described above  Multifactorial disease results in severe left foraminal narrowing at C5-C6 and C6-C7  Minimal flattening of the left ventral aspect of the cord is present at C5-C6  Workstation performed: [id number]       I have personally reviewed pertinent reports  and I have personally reviewed pertinent films in PACS    I have spent a total time of 60 minutes on 06/07/23 in caring for this patient including Diagnostic results, Risks and benefits of tx options, Instructions for management, Patient and family education, Importance of tx compliance, Risk factor reductions, Impressions, Counseling / Coordination of care, Documenting in the medical record, Reviewing / ordering tests, medicine, procedures  , Obtaining or reviewing history   and Communicating with other healthcare professionals

## 2023-06-06 NOTE — ASSESSMENT & PLAN NOTE
· As addressed in HPI  · mJOA -16, need hand rail on stairs ,   · No bowel (retention ) or bladder (incontience) dysfunction, no gait instability  · CTS assessment Tinel's negative , Phalen positive , right hand describes symptome of CTS ---  · Horowitz -negative    Imagining  5/8/23 MRI cervical Multilevel cervical spondylosis, as described above  Multifactorial disease results in severe left foraminal narrowing at C5-C6 and C6-C7  Minimal flattening of the left ventral aspect of the cord is present at C5-C6  Plan   · Reviewed MRI cervical  · Ordered EMG RUE   · RTO or virtual visit after EMG completion   · Referral to pain management -Dr Aileen Mabry   Please initiate multimodal treatment    · Referral to physical therapy Evaluate and Treat Cervical radiculopathy , cervical paraspinal pain soreness  , myofascial pain upper cervical trapezius area,   Message   Heat, ergonomics assessment of work setting ---core strengthening cervical

## 2023-06-13 ENCOUNTER — EVALUATION (OUTPATIENT)
Dept: PHYSICAL THERAPY | Facility: CLINIC | Age: 58
End: 2023-06-13
Payer: COMMERCIAL

## 2023-06-13 DIAGNOSIS — M54.2 MYOFASCIAL NECK PAIN: ICD-10-CM

## 2023-06-13 DIAGNOSIS — M47.22 OSTEOARTHRITIS OF SPINE WITH RADICULOPATHY, CERVICAL REGION: ICD-10-CM

## 2023-06-13 DIAGNOSIS — M54.12 RADICULOPATHY, CERVICAL: ICD-10-CM

## 2023-06-13 PROCEDURE — 97162 PT EVAL MOD COMPLEX 30 MIN: CPT | Performed by: PHYSICAL THERAPIST

## 2023-06-13 NOTE — PROGRESS NOTES
PT Evaluation     Today's date: 2023  Patient name: Lois Uriostegui  : 1965  MRN: [id number]  Referring provider: MANDY Dang  Dx:   Encounter Diagnosis     ICD-10-CM    1  Radiculopathy, cervical  M54 12 Ambulatory Referral to Physical Therapy      2  Osteoarthritis of spine with radiculopathy, cervical region  M47 22 Ambulatory Referral to Physical Therapy      3  Myofascial neck pain  M54 2 Ambulatory Referral to Physical Therapy          Start Time: 1400  Stop Time: 1473  Total time in clinic (min): 45 minutes    Assessment/Plan    This patient presents w/ chronic neck pain and HA which has progressively worsened  She exhibits limited and painful and shoulder and cervical ROM, HA's which appear to be cervicogenic, adverse neural tension in R median, ulnar and radial nerves  This patient is a good candidate for skilled physical therapy to reduce pain and improve function  Planned Interventions:  manual therapy, ROM, stretching, strengthening, therapeutic activities, neuromuscular re-ed and instruction in HEP  Frequency and duration: 2 x/week for 6-8 weeks    STG’s: 4 weeks  1  Increase cervical ROM in rotattion and B lateral flexion  2  Decrease pain 2-3 levels                    LTG’s: 6-8 weeks  1  Independent HEP  2  Increase strength BUE to 5/5  3  Able to perform usual activities including sitting, lifting and reaching overhead without pain  > 0-3/10          Subjective  This is a 62 y o  female who reports many year hx of neck pain which has 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  Has constant HA's, neck pain, unable to do anything for any amount of time due to neck pain    Current symptoms:  Pain back of the neck, worse on the R side; HA's come up the back of the neck toward the top of the head; burning pain and cramping over the shoulder; burning pain in the ulnar side of the R forearm; gets numbness and tingling in the B arms w/ carrying objects; sometimes awakens in the night w/ neck pain  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, migraines  Aggravating factors:  Any prolonged position, lifting, anything overhead, sitting, working on computer  Relieving factors: lying down in the right position with contoured memory foam pillow  Previous treatment:  none  Dx tests: MRI cervical; has EMG scheduled  Occupation: nurse, now working from home, on computer and phone, 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; has headset and standing desk but they don't help  Leisure:  Considering starting yoga    Patient’s goal:  To not have pain    Objective  Pain scale:  0-7/10    Cervical ROM:                                           Flexion AROM   WNL           Extension  AROM     43  pain       R lateral flexion AROM   25        L lateral flexion AROM    33         R rotation AROM    63        L rotation AROM    55        Strength   Shoulder              R   AROM   L     R  PROM  L     R  Strength  L  Flexion   4+/5      4+/5   Extension      Abd   4+/5      4+/5   ER   5/5         5/5   IR   4+/5      4+/5     Elbow   AROM  PROM  Strength  Flexion   5/5         4-/5   Extension   5/5         3+/5     Wrist   AROM  PROM  Strength  flexion   4+/5       4+/5   extension   5/5         4+/5     B shoulder ROM is WFL w/ slight shoulder pain at end ranges    Special tests:    Cervical compression: (-)    Cervical Distraction:  Relieving     Neural tension tests    Median:  Increased tension R    Ulnar: increased tension R    Radial: increased tension R    Palpation    Tender/ tight B cervical paraspinals, tightness R UT          Precautions: none  PMH: lymphoma, thyroid dysfunction    DAILY TREATMENT RECORD    Manuals 6/13            STM/stretch/ Trp rls B cervical mm             Cervical traction                                       Neuro Re-Ed             Cervical retraction             scap retraction             DNF training Ther Ex             C-AROM             Supine shld flex, abd, horiz abd 1/2 roll             Radial n   Ball pass             Median nerve glides             butterflies                                                    Ther Activity                                       Gait Training                                       Modalities

## 2023-06-15 ENCOUNTER — OFFICE VISIT (OUTPATIENT)
Dept: PHYSICAL THERAPY | Facility: CLINIC | Age: 58
End: 2023-06-15
Payer: COMMERCIAL

## 2023-06-15 DIAGNOSIS — M47.22 OSTEOARTHRITIS OF SPINE WITH RADICULOPATHY, CERVICAL REGION: ICD-10-CM

## 2023-06-15 DIAGNOSIS — M54.2 MYOFASCIAL NECK PAIN: ICD-10-CM

## 2023-06-15 DIAGNOSIS — M54.12 RADICULOPATHY, CERVICAL: ICD-10-CM

## 2023-06-15 PROCEDURE — 97140 MANUAL THERAPY 1/> REGIONS: CPT | Performed by: PHYSICAL THERAPIST

## 2023-06-15 PROCEDURE — 97110 THERAPEUTIC EXERCISES: CPT | Performed by: PHYSICAL THERAPIST

## 2023-06-15 NOTE — PROGRESS NOTES
"Daily Note     Today's date: 6/15/2023  Patient name: Rosales West  : 1965  MRN: [id number]  Referring provider: MANDY Gill  Dx:   Encounter Diagnosis     ICD-10-CM    1  Radiculopathy, cervical  M54 12 EMG 1 Limb      2  Osteoarthritis of spine with radiculopathy, cervical region  M47 22 EMG 1 Limb      3  Myofascial neck pain  M54 2 EMG 1 Limb          Start Time: 1402  Stop Time: 1446  Total time in clinic (min): 44 minutes    Subjective: c/o neck pain, migraine yesterday      Objective: See treatment diary below      Assessment: initiated treatment this visit as noted below; tolerated treatment well  Good response to nerve glides with increased neural mobility afterward  Patient would benefit from continued PT      Plan: Continue per plan of care  Precautions: none  PMH: lymphoma, thyroid dysfunction    DAILY TREATMENT RECORD    Manuals 6/13 6/15           STM/stretch/ Trp rls B cervical mm  JR           Cervical traction  JR           RUE nerve glides  JR                        Neuro Re-Ed             Cervical retraction             scap retraction             DNF training                                                                 Ther Ex             C-AROM             Supine shld flex, abd, horiz abd 1/2 roll  No roll 10ea           Radial n   Ball pass  10x           Ulnar n  glides  10x           Median nerve glides  10x           butterflies  10x5\"                                                  Ther Activity                                       Gait Training                                       Modalities                                            "

## 2023-06-20 ENCOUNTER — CONSULT (OUTPATIENT)
Dept: PAIN MEDICINE | Facility: CLINIC | Age: 58
End: 2023-06-20
Payer: COMMERCIAL

## 2023-06-20 ENCOUNTER — OFFICE VISIT (OUTPATIENT)
Dept: PHYSICAL THERAPY | Facility: CLINIC | Age: 58
End: 2023-06-20
Payer: COMMERCIAL

## 2023-06-20 VITALS
HEART RATE: 74 BPM | BODY MASS INDEX: 31.32 KG/M2 | WEIGHT: 188 LBS | HEIGHT: 65 IN | TEMPERATURE: 98.4 F | SYSTOLIC BLOOD PRESSURE: 118 MMHG | DIASTOLIC BLOOD PRESSURE: 72 MMHG

## 2023-06-20 DIAGNOSIS — M47.812 SPONDYLOSIS OF CERVICAL SPINE: ICD-10-CM

## 2023-06-20 DIAGNOSIS — M79.18 CERVICAL MYOFASCIAL PAIN SYNDROME: Primary | ICD-10-CM

## 2023-06-20 DIAGNOSIS — G43.009 MIGRAINE WITHOUT AURA AND WITHOUT STATUS MIGRAINOSUS, NOT INTRACTABLE: ICD-10-CM

## 2023-06-20 DIAGNOSIS — M54.12 RADICULOPATHY, CERVICAL: Primary | ICD-10-CM

## 2023-06-20 DIAGNOSIS — M54.2 NECK PAIN: ICD-10-CM

## 2023-06-20 DIAGNOSIS — M47.22 OSTEOARTHRITIS OF SPINE WITH RADICULOPATHY, CERVICAL REGION: ICD-10-CM

## 2023-06-20 DIAGNOSIS — M54.2 MYOFASCIAL NECK PAIN: ICD-10-CM

## 2023-06-20 DIAGNOSIS — Z92.3 HISTORY OF RADIATION TO HEAD AND NECK REGION: ICD-10-CM

## 2023-06-20 PROBLEM — J39.2 NASOPHARYNGEAL MASS: Status: ACTIVE | Noted: 2022-06-14

## 2023-06-20 PROBLEM — Z98.890: Status: ACTIVE | Noted: 2020-05-03

## 2023-06-20 PROBLEM — H26.8: Status: ACTIVE | Noted: 2020-10-29

## 2023-06-20 PROCEDURE — 99204 OFFICE O/P NEW MOD 45 MIN: CPT | Performed by: ANESTHESIOLOGY

## 2023-06-20 PROCEDURE — 97110 THERAPEUTIC EXERCISES: CPT | Performed by: PHYSICAL THERAPIST

## 2023-06-20 PROCEDURE — 97112 NEUROMUSCULAR REEDUCATION: CPT | Performed by: PHYSICAL THERAPIST

## 2023-06-20 NOTE — PROGRESS NOTES
Assessment  1  Cervical myofascial pain syndrome    2  Spondylosis of cervical spine    3  Neck pain    4  History of radiation to head and neck region    5  Migraine without aura and without status migrainosus, not intractable        Plan    Extremely pleasant 59-year-old female with 3-year history of neck pain  Longstanding history of migraines  Her symptoms may be multifactorial   In regards to her right-sided neck pain believe she has myofascial pain syndrome and have her continue to follow through with tender point physical therapy  We will follow-up in approximately 3 to 4 weeks time if she just started physical therapy  If her pain persist then one of consider trigger point injections to the affected region  I did explain trigger point therapy and the concept behind trigger points she expressed an understanding and information was provided for our review  If after physical therapy and trigger point therapy her pain persist then 1 may consider pain being generated by the cervical facet joints but we will reevaluate if needed  I advised her to follow-up with neurology as scheduled regarding her migraines  My impressions and treatment recommendations were discussed in detail with the patient who verbalized understanding and had no further questions  Discharge instructions were provided  I personally saw and examined the patient and I agree with the above discussed plan of care  This note is created using dictation transcription  It may contain typographical errors, grammatical errors, improperly dictated words, background noise and other errors  Referred By: MANDY Cespedes  History of Present Illness    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 3-year history of right-sided neck pain a longstanding history of migraines starting in the occiput radiating into the ear region she also has right-sided nasal pain    Has a history of lymphoma with lymph node dissection and nerve injury secondary to the dissection  This has resulted in numbness along her left shoulder but her main complaints are her neck and headaches  She has seen neurology in the past nothing recent  She takes medical marijuana for her chronic pain  Her symptoms are moderate to severe she rates it tween 3-8 out of 10 on the visual analog scale significant impairing with her daily living activities  Her pain is nearly constant worse in the afternoon and evening described as burning in her neck shooting on the right side of her face with pins-and-needles in her upper extremities and throbbing in her head  Lying down decreases her symptoms while bending sitting aggravate her pain  I have personally reviewed and/or updated the patient's past medical history, past surgical history, family history, social history, current medications, allergies, and vital signs today  Review of Systems   Constitutional: Negative for fever and unexpected weight change  HENT: Negative for trouble swallowing  Eyes: Positive for visual disturbance  Respiratory: Negative for shortness of breath and wheezing  Cardiovascular: Positive for palpitations  Negative for chest pain  Gastrointestinal: Negative for constipation, diarrhea, nausea and vomiting  Endocrine: Positive for polyuria  Negative for cold intolerance, heat intolerance and polydipsia  Genitourinary: Negative for difficulty urinating and frequency  Musculoskeletal: Negative for arthralgias, gait problem, joint swelling and myalgias  Skin: Negative for rash  Neurological: Positive for numbness and headaches  Negative for dizziness, seizures, syncope and weakness  Hematological: Does not bruise/bleed easily  Psychiatric/Behavioral: Positive for decreased concentration  Negative for dysphoric mood  All other systems reviewed and are negative        Patient Active Problem List   Diagnosis   • IBS (irritable bowel syndrome)   • Iron deficiency   • Migraine without aura and without status migrainosus, not intractable   • Supraventricular tachycardia (HCC)   • Retinitis pigmentosa   • Hypothyroidism due to Hashimoto's thyroiditis   • Enhanced S-cone syndrome   • History of colonic polyps   • Peripheral visual field defect of both eyes   • 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)   • Gastro-esophageal reflux disease with esophagitis   • S/P MISHA (total abdominal hysterectomy)   • Vitamin D deficiency   • History of radiation to head and neck region   • Postmenopausal   • Elevated parathyroid hormone   • Acute right-sided low back pain with right-sided sciatica   • Class 1 obesity with body mass index (BMI) of 31 0 to 31 9 in adult   • Follicular lymphoma of extranodal site excluding spleen and other solid organs (HCC)   • Radiculopathy, cervical   • Nasopharyngeal mass   • S/P dissection of cervical lymph nodes   • Subcapsular cataract       Past Medical History:   Diagnosis Date   • Cancer (HCC)     non-hogikins lymphoma    • Colon polyp    • Convergence insufficiency     bilateral eyes     • COVID-19 01/2022   • Disease of thyroid gland    • Endometrial hyperplasia    • Enhanced S-cone syndrome     eyes   • Follicular lymphoma ([facility] [address] ) 04/2020   • GERD (gastroesophageal reflux disease)    • Irregular heart beat     SVT   • Non Hodgkin's lymphoma ([address] ) 2020    stage 1, had surgery and XRT to the neck( finished july 2020), rituximab in falll 2022 for recurrence in sinus   • Overweight    • Retinitis pigmentosa        Past Surgical History:   Procedure Laterality Date   • ANAL SPHINCTEROPLASTY     • ANAL/SPHINCTEROPLASTY     • ANTERIOR AND POSTERIOR VAGINAL REPAIR     • BLADDER SUSPENSION     • COLONOSCOPY     • DILATION AND CURETTAGE OF UTERUS      X 2   • HYSTERECTOMY  2014   • OOPHORECTOMY Bilateral 2014    with hysterectomy   • TONSILLECTOMY AND ADENOIDECTOMY     • UPPER GASTROINTESTINAL ENDOSCOPY     • [address] BIOPSY LEFT  04/21/2020       Family History Problem Relation Age of Onset   • Cancer Mother         lung   • COPD Father    • Diabetes type II Brother         on insulin   • Substance Abuse Sister    • HIV Sister    • No Known Problems Daughter    • No Known Problems Maternal Grandmother    • No Known Problems Paternal Grandmother    • Throat cancer Maternal Grandfather    • Other Maternal Grandfather         Aortic valve replacement    • No Known Problems Paternal Grandfather    • Mental illness Neg Hx    • Colon cancer Neg Hx    • Colon polyps Neg Hx        Social History     Occupational History   • Occupation: RN   Tobacco Use   • Smoking status: Never   • Smokeless tobacco: Never   Vaping Use   • Vaping Use: Never used   Substance and Sexual Activity   • Alcohol use: Yes     Comment: rare   • Drug use: Yes     Types: Marijuana     Comment: help to sleep, medical marijuana card   • Sexual activity: Not on file       Current Outpatient Medications on File Prior to Visit   Medication Sig   • calcium carbonate (Tums Ultra 1000) 1000 MG chewable tablet Takes 1 as needed   • DULoxetine (CYMBALTA) 20 mg capsule TAKE 1 CAPSULE BY MOUTH EVERY DAY   • estradiol (ESTRACE) 0 5 MG tablet Take 0 5 mg by mouth daily    • famotidine (PEPCID) 20 mg tablet 1 as needed for break through   • ibuprofen (MOTRIN) 200 mg tablet Take 600 mg by mouth every 6 (six) hours as needed for mild pain   • Lutein 20 MG CAPS Take by mouth daily   • Multiple Vitamins-Minerals (MULTIVITAMIN GUMMIES WOMENS) CHEW Chew 2 each daily   • omeprazole (PriLOSEC) 40 MG capsule Take 1 capsule (40 mg total) by mouth daily   • verapamil (CALAN-SR) 120 mg CR tablet Take 120 mg by mouth   • VITAMIN D, ERGOCALCIFEROL, PO Take 5,000 Units by mouth daily   • levothyroxine 125 mcg tablet Take 1 tablet 6 days a week and 1/2 tablet on sunday (Patient taking differently: Take 1 tablet 6 days a week)   • NON FORMULARY Cspsules--take 1 at bedtime   • ZOLMitriptan (ZOMIG-ZMT) 2 5 MG disintegrating tablet Take 1 "tablet (2 5 mg total) by mouth daily as needed for migraine     No current facility-administered medications on file prior to visit  Allergies   Allergen Reactions   • Latex Rash and Hives   • Iodinated Contrast Media Sneezing     Sneezing, tongue tingling  Sneezing, tongue tingling     • Gabapentin Edema   • Lyrica [Pregabalin] Edema   • Other      Other reaction(s): hair dye causes rash  Orange coating   • Rituximab Itching and Other (See Comments)     Felt hot all over and restless   • Lansoprazole Rash       Physical Exam    /72 (BP Location: Left arm, Patient Position: Sitting, Cuff Size: Standard)   Pulse 74   Temp 98 4 °F (36 9 °C)   Ht 5' 5\" (1 651 m)   Wt 85 3 kg (188 lb)   LMP  (LMP Unknown)   BMI 31 28 kg/m²     Constitutional: normal, well developed, well nourished, alert, in no distress and non-toxic and no overt pain behavior  and overweight  Eyes: anicteric  HEENT: grossly intact  Neck: supple, symmetric, trachea midline and no masses   Pulmonary:even and unlabored  Cardiovascular:No edema or pitting edema present  Skin:Normal without rashes or lesions and well hydrated  Psychiatric:Mood and affect appropriate  Neurologic:Cranial Nerves II-XII grossly intact  Musculoskeletal:normal,  Inspection: Normal station and gait  Normal cervical curves and head posture  Skin intact without erythema  Positive surgical scar left anterior neck without erythema  No sensory loss  There is no atrophy  Palpation: There is tenderness to palpation overlying the left cervical paraspinals, cervical facet joints  There is no tenderness over the upper trapezius muscles bilateral  No shoulder tenderness  Motor/Strength: Equal strength in the bilateral upper extremities  Reflexes: equal and symmetric in the upper limbs  Sensation: Sensation intact to light touch and pinprick in the upper limbs  Maneuvers: Negative Spurling's maneuver  Negative Lhermitte's sign    Decreased range of motion particularly " in right sidebending and rotation bilateral     Imaging  MRI CERVICAL SPINE WITHOUT CONTRAST @  5-8-23     INDICATION: [id number] [id number]: Migraine without aura, not intractable, without status migrainosus  M47 22: Other spondylosis with radiculopathy, cervical region      COMPARISON:  None      TECHNIQUE:  Multiplanar, multisequence imaging of the cervical spine was performed           IMAGE QUALITY:  Diagnostic     FINDINGS:     ALIGNMENT: Retrolisthesis of C4-C5  Atlantodental distance is preserved  Craniocervical junction is unremarkable      MARROW SIGNAL:  Normal marrow signal is identified within the visualized bony structures  No discrete marrow lesion      CERVICAL AND VISUALIZED THORACIC CORD:  Normal signal within the visualized cord      PREVERTEBRAL AND PARASPINAL SOFT TISSUES:  Normal      VISUALIZED POSTERIOR FOSSA:  The visualized posterior fossa demonstrates no abnormal signal      CERVICAL DISC SPACES:     C2-C3:  Normal      C3-C4:  Normal      C4-C5: Disc osteophyte complex with uncovertebral spurring  Facet arthrosis  Mild canal stenosis  Moderate bilateral foraminal narrowing      C5-C6: Disc osteophyte complex with uncovertebral spurring, asymmetric to the left  Severe left foraminal narrowing  Mild right foraminal narrowing  Mild canal stenosis with minimal flattening of the left aspect of the cord      C6-C7: Disc osteophyte complex with uncovertebral spurring, asymmetric to the left  No significant canal stenosis   Severe left foraminal narrowing      C7-T1:  Normal      UPPER THORACIC DISC SPACES:  Normal      OTHER FINDINGS:  None      IMPRESSION:     Multilevel cervical spondylosis, as described above      Multifactorial disease results in severe left foraminal narrowing at C5-C6 and C6-C7      Minimal flattening of the left ventral aspect of the cord is present at C5-C6      I have personally reviewed pertinent films in PACS and my interpretation is Multilevel cervical spondylosis with foraminal stenosis

## 2023-06-20 NOTE — PROGRESS NOTES
"Daily Note     Today's date: 2023  Patient name: Chuck Munguia  : 1965  MRN: [id number]  Referring provider: MANDY Batista  Dx:   Encounter Diagnosis     ICD-10-CM    1  Radiculopathy, cervical  M54 12       2  Osteoarthritis of spine with radiculopathy, cervical region  M47 22       3  Myofascial neck pain  M54 2           Start Time: 0702  Stop Time: 9950  Total time in clinic (min): 48 minutes    Subjective: feels tight like a rubber band; neck feels stiff      Objective: See treatment diary below      Assessment: tolerated treatment well  Patient would benefit from continued PT      Plan: Continue per plan of care  Precautions: none  PMH: lymphoma, thyroid dysfunction  Access Code: [email or web address]     DAILY TREATMENT RECORD    Manuals 6/13 6/15 6/20          STM/stretch/ Trp rls B cervical mm  JR           Cervical traction  JR           RUE nerve glides  JR JR          Stretch B UT, scalene   JR          Neuro Re-Ed             Cervical retraction   10x5\"          scap retraction   10x10\"          DNF training seated   10x10\"          DNF supine w/ cuff   nv                                                 Ther Ex             C-AROM             Supine shld flex, abd, horiz abd 1/2 roll  No roll 10ea 10x ea          Radial n   Ball pass  10x 10x          Ulnar n  glides  10x 10x          Median nerve glides  10x 10x           butterflies  10x5\"           Seated T-spine ext 1/2 roll                                       Ther Activity                                       Gait Training                                       Modalities                                            "

## 2023-06-22 ENCOUNTER — OFFICE VISIT (OUTPATIENT)
Dept: PHYSICAL THERAPY | Facility: CLINIC | Age: 58
End: 2023-06-22
Payer: COMMERCIAL

## 2023-06-22 DIAGNOSIS — M47.22 OSTEOARTHRITIS OF SPINE WITH RADICULOPATHY, CERVICAL REGION: ICD-10-CM

## 2023-06-22 DIAGNOSIS — M54.2 MYOFASCIAL NECK PAIN: ICD-10-CM

## 2023-06-22 DIAGNOSIS — M54.12 RADICULOPATHY, CERVICAL: Primary | ICD-10-CM

## 2023-06-22 PROCEDURE — 97110 THERAPEUTIC EXERCISES: CPT | Performed by: PHYSICAL THERAPIST

## 2023-06-22 PROCEDURE — 97140 MANUAL THERAPY 1/> REGIONS: CPT | Performed by: PHYSICAL THERAPIST

## 2023-06-22 NOTE — PROGRESS NOTES
"Daily Note     Today's date: 2023  Patient name: Cate Rowland  : 1965  MRN: [id number]  Referring provider: MANDY Zeng  Dx:   Encounter Diagnosis     ICD-10-CM    1  Radiculopathy, cervical  M54 12       2  Osteoarthritis of spine with radiculopathy, cervical region  M47 22       3  Myofascial neck pain  M54 2           Start Time: 1015  Stop Time: 1110  Total time in clinic (min): 55 minutes    Subjective: states she has been trying to do deep neck flexor exercise when sitting at work and it has helped a little      Objective: See treatment diary below      Assessment: performed exercises w/ good technique and tolerated treatment well  Patient would benefit from continued PT      Plan: Continue per plan of care  Precautions: none  PMH: lymphoma, thyroid dysfunction  Access Code: [email or web address]     DAILY TREATMENT RECORD    Manuals 6/13 6/15 6/20 6/22         STM/stretch/ Trp rls B cervical mm  JR           Cervical traction  JR           RUE nerve glides  JR JR          Stretch B UT, scalene   JR          Neuro Re-Ed             Cervical retraction   10x5\"          scap retraction   10x10\"          DNF training seated   10x10\" 10x10\"         DNF supine w/ cuff   nv 15x10\"                                                Ther Ex             C-AROM             Supine shld flex, abd, horiz abd 1/2 roll  No roll 10ea 10x ea 10ea         Radial n   Ball pass  10x 10x 10x         Ulnar n  glides  10x 10x 10x         Median nerve glides  10x 10x  10x         butterflies  10x5\"  10x5\"         Seated T-spine ext 1/2 roll             SL T-spine w/reach rotation    10x10\"                      Ther Activity                                       Gait Training                                       Modalities                                            "

## 2023-06-27 ENCOUNTER — OFFICE VISIT (OUTPATIENT)
Dept: PHYSICAL THERAPY | Facility: CLINIC | Age: 58
End: 2023-06-27
Payer: COMMERCIAL

## 2023-06-27 DIAGNOSIS — M54.2 MYOFASCIAL NECK PAIN: ICD-10-CM

## 2023-06-27 DIAGNOSIS — M54.12 RADICULOPATHY, CERVICAL: Primary | ICD-10-CM

## 2023-06-27 DIAGNOSIS — M47.22 OSTEOARTHRITIS OF SPINE WITH RADICULOPATHY, CERVICAL REGION: ICD-10-CM

## 2023-06-27 PROCEDURE — 97110 THERAPEUTIC EXERCISES: CPT

## 2023-06-27 PROCEDURE — 97140 MANUAL THERAPY 1/> REGIONS: CPT

## 2023-06-27 NOTE — PROGRESS NOTES
"Daily Note     Today's date: 2023  Patient name: Kike Murphy  : 1965  MRN: [id number]  Referring provider: MANDY Ferguson  Dx:   Encounter Diagnosis     ICD-10-CM    1  Radiculopathy, cervical  M54 12       2  Myofascial neck pain  M54 2       3  Osteoarthritis of spine with radiculopathy, cervical region  M47 22           Start Time: 1745  Stop Time: 1830  Total time in clinic (min): 45 minutes    Subjective: Pt reports sx are minimal since she was off today from work  Objective: See treatment diary below      Assessment:  Diana tolerated treatment well with consistent cuing throughout  TE's were performed with the same resistance and reps  No new TE's were performed today  Following treatment, the patient exhibited good technique with therapeutic exercises and would benefit from continued PT  Plan: Continue per plan of care  Precautions: none  PMH: lymphoma, thyroid dysfunction  Access Code: [email or web address]     DAILY TREATMENT RECORD    Manuals 6/13 6/15 6/20 6/22 6/27        STM/stretch/ Trp rls B cervical mm  JR           Cervical traction  JR           RUE nerve glides  JR JR          Stretch B UT, scalene   JR          Neuro Re-Ed             Cervical retraction   10x5\"  10x5''        scap retraction   10x10\"  10x10''        DNF training seated   10x10\" 10x10\" 10x10''        DNF supine w/ cuff   nv 15x10\" 15x10''                                               Ther Ex             C-AROM             Supine shld flex, abd, horiz abd 1/2 roll  No roll 10ea 10x ea 10ea 10x ea        Radial n   Ball pass  10x 10x 10x 10x        Ulnar n  glides  10x 10x 10x 10x        Median nerve glides  10x 10x  10x 10x        butterflies  10x5\"  10x5\" 10x5''        Seated T-spine ext 1/2 roll             SL T-spine w/reach rotation    10x10\" 10x10''                     Ther Activity                                       Gait Training                                       Modalities                    "

## 2023-06-29 ENCOUNTER — APPOINTMENT (OUTPATIENT)
Dept: PHYSICAL THERAPY | Facility: CLINIC | Age: 58
End: 2023-06-29
Payer: COMMERCIAL

## 2023-07-06 ENCOUNTER — OFFICE VISIT (OUTPATIENT)
Dept: PHYSICAL THERAPY | Facility: CLINIC | Age: 58
End: 2023-07-06
Payer: COMMERCIAL

## 2023-07-06 DIAGNOSIS — M54.12 RADICULOPATHY, CERVICAL: Primary | ICD-10-CM

## 2023-07-06 DIAGNOSIS — M47.22 OSTEOARTHRITIS OF SPINE WITH RADICULOPATHY, CERVICAL REGION: ICD-10-CM

## 2023-07-06 DIAGNOSIS — M54.2 MYOFASCIAL NECK PAIN: ICD-10-CM

## 2023-07-06 PROCEDURE — 97110 THERAPEUTIC EXERCISES: CPT

## 2023-07-06 PROCEDURE — 97140 MANUAL THERAPY 1/> REGIONS: CPT

## 2023-07-06 NOTE — PROGRESS NOTES
Daily Note     Today's date: 2023  Patient name: Heydi Benjamin  : 1965  MRN: [id number]  Referring provider: MANDY Betancourt  Dx:   Encounter Diagnosis     ICD-10-CM    1. Radiculopathy, cervical  M54.12       2. Myofascial neck pain  M54.2       3. Osteoarthritis of spine with radiculopathy, cervical region  M47.22           Start Time: 1748  Stop Time:   Total time in clinic (min): 46 minutes    Subjective: Patient rates pain in b/l neck at 8/10 prior to start of session. She states she worked all day and tried to release tension in neck, but was unsuccessful. Objective: See treatment diary below      Assessment: Patient demonstrated good response to manuals with reduced sxs post. DNF in supine held this visit to prevent increasing sxs, resume NV. Tolerated treatment well. Patient demonstrated fatigue post treatment and would benefit from continued PT to strengthen c-s. Plan: Continue per plan of care. Precautions: none  PMH: lymphoma, thyroid dysfunction  Access Code: [email or web address]     DAILY TREATMENT RECORD    Manuals 6/13 6/15 6/20 6/22 6/27 7/6       STM/stretch/ Trp rls B cervical mm  JR   FH LQ       Cervical traction  JR           RUE nerve glides  JR JR          Stretch B UT, scalene   JR  FH LQ       Neuro Re-Ed             Cervical retraction   10x5"  10x5'' 10x5''+ ROT       scap retraction   10x10"  10x10'' 10x10''       DNF training seated   10x10" 10x10" 10x10'' 10x10''       DNF supine w/ cuff   nv 15x10" 15x10'' HELD                                              Ther Ex             C-AROM             Supine shld flex, abd, horiz abd 1/2 roll  No roll 10ea 10x ea 10ea 10x ea 10x ea       Radial n.  Ball pass  10x 10x 10x 10x 10xea       Ulnar n. glides  10x 10x 10x 10x 10xea       Median nerve glides  10x 10x  10x 10x 10ea       butterflies  10x5"  10x5" 10x5'' 10x5''       Seated T-spine ext 1/2 roll             SL T-spine w/reach rotation    10x10" 10x10'' 10x10'' Ther Activity                                       Gait Training                                       Modalities

## 2023-07-11 ENCOUNTER — OFFICE VISIT (OUTPATIENT)
Dept: PHYSICAL THERAPY | Facility: CLINIC | Age: 58
End: 2023-07-11
Payer: COMMERCIAL

## 2023-07-11 ENCOUNTER — HOSPITAL ENCOUNTER (OUTPATIENT)
Dept: MAMMOGRAPHY | Facility: IMAGING CENTER | Age: 58
Discharge: HOME | End: 2023-07-11
Payer: COMMERCIAL

## 2023-07-11 VITALS — BODY MASS INDEX: 30.49 KG/M2 | HEIGHT: 65 IN | WEIGHT: 183 LBS

## 2023-07-11 DIAGNOSIS — M54.12 RADICULOPATHY, CERVICAL: Primary | ICD-10-CM

## 2023-07-11 DIAGNOSIS — M47.22 OSTEOARTHRITIS OF SPINE WITH RADICULOPATHY, CERVICAL REGION: ICD-10-CM

## 2023-07-11 DIAGNOSIS — Z12.31 ENCOUNTER FOR SCREENING MAMMOGRAM FOR MALIGNANT NEOPLASM OF BREAST: ICD-10-CM

## 2023-07-11 DIAGNOSIS — M54.2 MYOFASCIAL NECK PAIN: ICD-10-CM

## 2023-07-11 PROCEDURE — 97110 THERAPEUTIC EXERCISES: CPT | Performed by: PHYSICAL THERAPIST

## 2023-07-11 PROCEDURE — 77067 SCR MAMMO BI INCL CAD: CPT

## 2023-07-11 PROCEDURE — 97140 MANUAL THERAPY 1/> REGIONS: CPT | Performed by: PHYSICAL THERAPIST

## 2023-07-11 PROCEDURE — 77063 BREAST TOMOSYNTHESIS BI: CPT

## 2023-07-11 NOTE — PROGRESS NOTES
PT Evaluation     Today's date: 2023  Patient name: Tisha Tidwell  : 1965  MRN: [id number]  Referring provider: MANDY Navarro  Dx:   Encounter Diagnosis     ICD-10-CM    1. Radiculopathy, cervical  M54.12       2. Myofascial neck pain  M54.2       3. Osteoarthritis of spine with radiculopathy, cervical region  M47.22           Start Time: 174  Stop Time: 1833  Total time in clinic (min): 48 minutes    Assessment/Plan    This patient demonstrates improvement since beginning therapy and is progressing toward LTG's. She will benefit from continued PT to reduce pain and improve function. Planned Interventions:  manual therapy, ROM, stretching, strengthening, therapeutic activities, neuromuscular re-ed and instruction in HEP. Frequency and duration: 2 x/week for 4-6 weeks    STG’s: 4 weeks  1. Increase cervical ROM in rotation and B lateral flexion - MET  2. Decrease pain 2-3 levels - not yet met                    LTG’s: 6-8 weeks  1. Independent HEP - progress  2. Increase strength BUE to 5/5 - mostly met  3. Able to perform usual activities including sitting, lifting and reaching overhead without pain  > 0-3/10 - not yet met          Subjective  This is a 62 y.o. female who reports many year hx of neck pain which has 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. Has constant HA's, neck pain, unable to do anything for any amount of time due to neck pain.   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, migraines  Aggravating factors:  Any prolonged position, lifting, anything overhead, sitting, working on computer  Relieving factors: lying down in the right position with contoured memory foam pillow  Previous treatment:  none  Dx tests: MRI cervical; has EMG scheduled next week  Occupation: nurse, now working from home, on computer and phone, 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; has headset and standing desk but they don't help  Leisure:  Considering starting yoga    Patient’s goal:  To not have pain    Objective  Pain scale:  0-7/10 on work days; up to 2-3/10 other days    Cervical ROM:                                           Flexion AROM   WNL           Extension  AROM     WNL  pain       R lateral flexion AROM   30        L lateral flexion AROM    35         R rotation AROM    60        L rotation AROM    63        Strength   Shoulder              R   AROM   L     R  PROM  L     R  Strength  L  Flexion   5/5           5/5   Extension      Abd   5 /5          5/5   ER   5/5           5/5   IR        Elbow   AROM  PROM  Strength  Flexion   5/5          4/5   Extension   5/5          5/5     Wrist   AROM  PROM  Strength  flexion   5/5           5/5   extension   5/5           5/5     B shoulder ROM is WFL w/ slight shoulder pain at end ranges    Special tests:    Cervical compression: (-)    Cervical Distraction:  Relieving     Neural tension tests    Median:  Increased tension R    Ulnar: increased tension R    Radial: increased tension R    Palpation    Tender/ tight R cervical paraspinals R UT     Precautions: none  PMH: lymphoma, thyroid dysfunction  Access Code: [email or web address]     DAILY TREATMENT RECORD    Manuals 6/13 6/15 6/20 6/22 6/27 7/6 7/11      STM/stretch/ Trp rls B cervical mm  JR    LQ        IASTM r cervical       JR      Cervical traction  JR           RUE nerve glidaysi  JR JR          Stretch B UT, scalene   JR   LQ       Neuro Re-Ed             Cervical retraction   10x5"  10x5'' 10x5''+ ROT       scap retraction   10x10"  10x10'' 10x10'' hep      DNF training seated   10x10" 10x10" 10x10'' 10x10'' hep      DNF supine w/ cuff   nv 15x10" 15x10'' HELD Ther Ex             C-AROM             Supine shld flex, abd, horiz abd 1/2 roll  No roll 10ea 10x ea 10ea 10x ea 10x ea 10 ea      Radial n.  Ball pass  10x 10x 10x 10x 10xea 10xea      Ulnar n. glides  10x 10x 10x 10x 10xea       Median nerve glides  10x 10x  10x 10x 10ea 10ea      butterflies  10x5"  10x5" 10x5'' 10x5'' 10x5"      Seated T-spine ext 1/2 roll             SL T-spine w/reach rotation    10x10" 10x10'' 10x10'' 10x 10"                   Ther Activity                                       Gait Training                                       Modalities

## 2023-07-13 ENCOUNTER — APPOINTMENT (OUTPATIENT)
Dept: PHYSICAL THERAPY | Facility: CLINIC | Age: 58
End: 2023-07-13
Payer: COMMERCIAL

## 2023-07-18 ENCOUNTER — APPOINTMENT (OUTPATIENT)
Dept: PHYSICAL THERAPY | Facility: CLINIC | Age: 58
End: 2023-07-18
Payer: COMMERCIAL

## 2023-07-20 ENCOUNTER — OFFICE VISIT (OUTPATIENT)
Dept: PHYSICAL THERAPY | Facility: CLINIC | Age: 58
End: 2023-07-20
Payer: COMMERCIAL

## 2023-07-20 DIAGNOSIS — M54.12 RADICULOPATHY, CERVICAL: Primary | ICD-10-CM

## 2023-07-20 DIAGNOSIS — M47.22 OSTEOARTHRITIS OF SPINE WITH RADICULOPATHY, CERVICAL REGION: ICD-10-CM

## 2023-07-20 DIAGNOSIS — M54.2 MYOFASCIAL NECK PAIN: ICD-10-CM

## 2023-07-20 PROCEDURE — 97110 THERAPEUTIC EXERCISES: CPT | Performed by: PHYSICAL THERAPIST

## 2023-07-20 PROCEDURE — 97140 MANUAL THERAPY 1/> REGIONS: CPT | Performed by: PHYSICAL THERAPIST

## 2023-07-20 NOTE — PROGRESS NOTES
Daily Note     Today's date: 2023  Patient name: Narendra Khan  : 1965  MRN: [id number]  Referring provider: MANDY Ruff  Dx:   Encounter Diagnosis     ICD-10-CM    1. Radiculopathy, cervical  M54.12       2. Myofascial neck pain  M54.2       3. Osteoarthritis of spine with radiculopathy, cervical region  M47.22           Start Time:   Stop Time:   Total time in clinic (min): 39 minutes    Subjective:  States that her pain is worse p working all day      Objective: See treatment diary below      Assessment: Tolerated treatment well. Patient would benefit from continued PT      Plan: Continue per plan of care. Precautions: none  PMH: lymphoma, thyroid dysfunction  Access Code: [email or web address]     DAILY TREATMENT RECORD    Manuals 6/13 6/15 6/20 6/22 6/27 7/6 7/11 7/20     STM/stretch/ Trp rls B cervical mm  JR   FH LQ        IASTM r cervical       JR JR     Cervical traction  JR           RUE nerve glides  JR JR          Stretch B UT, scalene   JR  FH LQ       Neuro Re-Ed             Cervical retraction   10x5"  10x5'' 10x5''+ ROT  10x5"     scap retraction   10x10"  10x10'' 10x10'' hep hep     DNF training seated   10x10" 10x10" 10x10'' 10x10'' hep hep     DNF supine w/ cuff   nv 15x10" 15x10'' HELD                                              Ther Ex             C-AROM             Supine shld flex, abd, horiz abd 1/2 roll  No roll 10ea 10x ea 10ea 10x ea 10x ea 10 ea 10ea     Radial n.  Ball pass  10x 10x 10x 10x 10xea 10xea 10ea     Ulnar n. glides  10x 10x 10x 10x 10xea  10ea     Median nerve glides  10x 10x  10x 10x 10ea 10ea 10ea     butterflies  10x5"  10x5" 10x5'' 10x5'' 10x5" 10x5"     Seated T-spine ext 1/2 roll             SL T-spine w/reach rotation    10x10" 10x10'' 10x10'' 10x 10" 10x 10"                  Ther Activity                                       Gait Training                                       Modalities

## 2023-07-25 ENCOUNTER — PROCEDURE VISIT (OUTPATIENT)
Dept: PAIN MEDICINE | Facility: CLINIC | Age: 58
End: 2023-07-25
Payer: COMMERCIAL

## 2023-07-25 ENCOUNTER — APPOINTMENT (OUTPATIENT)
Dept: PHYSICAL THERAPY | Facility: CLINIC | Age: 58
End: 2023-07-25
Payer: COMMERCIAL

## 2023-07-25 VITALS
BODY MASS INDEX: 31.32 KG/M2 | HEART RATE: 72 BPM | WEIGHT: 188 LBS | HEIGHT: 65 IN | TEMPERATURE: 97.9 F | SYSTOLIC BLOOD PRESSURE: 120 MMHG | DIASTOLIC BLOOD PRESSURE: 74 MMHG

## 2023-07-25 DIAGNOSIS — M79.18 MYOFASCIAL PAIN SYNDROME: Primary | ICD-10-CM

## 2023-07-25 DIAGNOSIS — M47.812 CERVICAL SPONDYLOSIS: ICD-10-CM

## 2023-07-25 DIAGNOSIS — M54.2 NECK PAIN: ICD-10-CM

## 2023-07-25 PROCEDURE — 20553 NJX 1/MLT TRIGGER POINTS 3/>: CPT | Performed by: PHYSICIAN ASSISTANT

## 2023-07-25 PROCEDURE — 99213 OFFICE O/P EST LOW 20 MIN: CPT | Performed by: PHYSICIAN ASSISTANT

## 2023-07-25 NOTE — PROGRESS NOTES
Assessment:  1. Myofascial pain syndrome    2. Neck pain    3. Cervical spondylosis        Plan:  While the patient was in the office today, I did have a thorough conversation regarding their chronic pain syndrome, medication management, and treatment plan options. After discussing options the patient has elected to proceed with the previously recommended trigger point injection to address the myofascial component of her pain pattern. Procedure Note:  After fully informed written consent was obtained, the above procedure was performed. Using aseptic technique a 25-gauge needle was placed in the region of the right paraspinal cervical muscle, right trapezius, right scalene. Approximately 5 cc of 1% Lidocaine and 40 mg of Depo-Medrol was injected in a fan-like fashion after negative aspiration with each cc. After     Complications:  None. Disposition: Motor function was intact. Vital signs stable. The patient was discharged home. The discharge instruction sheet was given to the patient. The patient was discharged with instructions to call immediately if there are any complications. I did review the trigger point injection discharge instructions with the patient. I explained that the best results from the injections typically occur within the next 3-5 days. I did explain that it is normal to feel an increase in soreness and/or pain, and even bruising. I did encourage heat/cold regiments, which ever decreases pain, as well as continuing a home stretching program.    Also recommended that the patient proceed with a right greater occipital nerve block to address the occipital neuralgia component of her pain pattern. The patient was advised to contact the office should their symptoms worsen in the interim. The patient was agreeable and verbalized an understanding. History of Present Illness:     The patient is a 62 y.o. female last seen on 6/20/2023 who presents for a follow up office visit in regards to chronic right-sided neck pain and headaches. The patient currently reports neck pain that she presently rates a 3 out of 10 on the pain scale describes it as a constant burning, throbbing, cramping and shooting pain that radiates laterally into the shoulder. The patient admits to frequent and severe right-sided greater occipital neuralgia type headache patterns. Patient presents today for trigger point injections as recommended by Dr. Vasiliy Treviño on her initial consultation last month. Patient recently had an EMG of the upper extremities and has a follow-up scheduled with neurosurgery. I have personally reviewed and/or updated the patient's past medical history, past surgical history, family history, social history, current medications, allergies, and vital signs today. Review of Systems:    Review of Systems   Respiratory: Negative for shortness of breath. Cardiovascular: Negative for chest pain. Gastrointestinal: Negative for constipation, diarrhea, nausea and vomiting. Musculoskeletal: Negative for arthralgias, gait problem, joint swelling and myalgias. Skin: Negative for rash. Neurological: Negative for dizziness, seizures and weakness. All other systems reviewed and are negative. Past Medical History:   Diagnosis Date   • Cancer ([address])     non-hogikins lymphoma    • Colon polyp    • Convergence insufficiency     bilateral eyes.    • COVID-19 01/2022   • Disease of thyroid gland    • Endometrial hyperplasia    • Enhanced S-cone syndrome     eyes   • Follicular lymphoma ([address]) 04/2020   • GERD (gastroesophageal reflux disease)    • Irregular heart beat     SVT   • Non Hodgkin's lymphoma ([address]) 2020    stage 1, had surgery and XRT to the neck( finished july 2020), rituximab in falll 2022 for recurrence in sinus   • Overweight    • Retinitis pigmentosa        Past Surgical History:   Procedure Laterality Date   • ANAL SPHINCTEROPLASTY     • ANAL/SPHINCTEROPLASTY     • ANTERIOR AND POSTERIOR VAGINAL REPAIR     • BLADDER SUSPENSION     • COLONOSCOPY     • DILATION AND CURETTAGE OF UTERUS      X 2   • HYSTERECTOMY  2014   • OOPHORECTOMY Bilateral 2014    with hysterectomy   • TONSILLECTOMY AND ADENOIDECTOMY     • UPPER GASTROINTESTINAL ENDOSCOPY     • US GUIDED LYMPH NODE BIOPSY LEFT  04/21/2020       Family History   Problem Relation Age of Onset   • Cancer Mother         lung   • Lung cancer Mother 61   • COPD Father    • Substance Abuse Sister    • HIV Sister    • No Known Problems Daughter    • No Known Problems Maternal Grandmother    • Throat cancer Maternal Grandfather 68   • Other Maternal Grandfather         Aortic valve replacement    • No Known Problems Paternal Grandmother    • No Known Problems Paternal Grandfather    • Diabetes type II Brother         on insulin   • No Known Problems Paternal Aunt    • Mental illness Neg Hx    • Colon cancer Neg Hx    • Colon polyps Neg Hx        Social History     Occupational History   • Occupation: RN   Tobacco Use   • Smoking status: Never   • Smokeless tobacco: Never   Vaping Use   • Vaping Use: Never used   Substance and Sexual Activity   • Alcohol use: Yes     Comment: rare   • Drug use: Yes     Types: Marijuana     Comment: help to sleep, medical marijuana card   • Sexual activity: Not on file         Current Outpatient Medications:   •  calcium carbonate (Tums Ultra 1000) 1000 MG chewable tablet, Takes 1 as needed, Disp: , Rfl:   •  DULoxetine (CYMBALTA) 20 mg capsule, TAKE 1 CAPSULE BY MOUTH EVERY DAY, Disp: 90 capsule, Rfl: 0  •  estradiol (ESTRACE) 0.5 MG tablet, Take 0.5 mg by mouth daily , Disp: , Rfl:   •  famotidine (PEPCID) 20 mg tablet, 1 as needed for break through, Disp: , Rfl:   •  ibuprofen (MOTRIN) 200 mg tablet, Take 600 mg by mouth every 6 (six) hours as needed for mild pain, Disp: , Rfl:   •  levothyroxine 125 mcg tablet, Take 1 tablet 6 days a week and 1/2 tablet on sunday (Patient taking differently: Take 1 tablet 6 days a week), Disp: 90 tablet, Rfl: 3  •  Lutein 20 MG CAPS, Take by mouth daily, Disp: , Rfl:   •  Multiple Vitamins-Minerals (MULTIVITAMIN GUMMIES WOMENS) CHEW, Chew 2 each daily, Disp: , Rfl:   •  NON FORMULARY, Cspsules--take 1 at bedtime, Disp: , Rfl:   •  omeprazole (PriLOSEC) 40 MG capsule, Take 1 capsule (40 mg total) by mouth daily, Disp: 90 capsule, Rfl: 12  •  verapamil (CALAN-SR) 120 mg CR tablet, Take 120 mg by mouth, Disp: , Rfl:   •  VITAMIN D, ERGOCALCIFEROL, PO, Take 5,000 Units by mouth daily, Disp: , Rfl:   •  ZOLMitriptan (ZOMIG-ZMT) 2.5 MG disintegrating tablet, Take 1 tablet (2.5 mg total) by mouth daily as needed for migraine, Disp: 18 tablet, Rfl: 3  No current facility-administered medications for this visit. Allergies   Allergen Reactions   • Latex Rash and Hives   • Iodinated Contrast Media Sneezing     Sneezing, tongue tingling  Sneezing, tongue tingling     • Gabapentin Edema   • Lyrica [Pregabalin] Edema   • Other      Other reaction(s): hair dye causes rash  Orange coating   • Rituximab Itching and Other (See Comments)     Felt hot all over and restless   • Lansoprazole Rash       Physical Exam:    /74 (BP Location: Left arm, Patient Position: Sitting, Cuff Size: Standard)   Pulse 72   Temp 97.9 °F (36.6 °C)   Ht 5' 5" (1.651 m)   Wt 85.3 kg (188 lb)   LMP  (LMP Unknown)   BMI 31.28 kg/m²     Constitutional:normal, well developed, well nourished, alert, in no distress and non-toxic and no overt pain behavior.   Eyes:anicteric  HEENT:grossly intact  Neck:supple, symmetric, trachea midline and no masses   Pulmonary:even and unlabored  Cardiovascular:No edema or pitting edema present  Skin:Normal without rashes or lesions and well hydrated  Psychiatric:Mood and affect appropriate  Neurologic:Cranial Nerves II-XII grossly intact  Musculoskeletal: Tender to palpation over the right suboccipital region and the right paraspinal muscles of the cervical spine, right trapezius muscle      Imaging  No orders to display         No orders of the defined types were placed in this encounter.

## 2023-07-25 NOTE — PATIENT INSTRUCTIONS
Trigger Point Injection   AMBULATORY CARE:   A trigger point injection  is used to relax a muscle knot. This helps relieve pain. You may be able to have more than one trigger point treated during a session. How to prepare for a trigger point injection:   Your healthcare provider will tell you how to prepare. Arrange to have someone drive you home after the injection. Tell your provider about all medicines you take, including pain medicine, blood thinners, and muscle relaxers. He or she will tell you if you need to stop any medicine for the injection, and when to stop. He or she will tell you which medicines to take or not take on the day of the injection. Tell your provider about all your allergies, including to any pain medicine. What will happen during a trigger point injection:   You may be sitting or lying, depending on where the trigger point is located. Your healthcare provider will feel for a knot in the muscle. He or she may benita your skin over the knot. Your provider will put a needle through your skin and into the trigger point. Saline (salt solution), pain relievers, or other medicines may be pushed through the needle into the trigger point. Your provider may use only a dry needle (no medicine). He or she will pull the needle almost all the way out and then push it in again. He or she will repeat this several times until the muscle stops twitching or feels relaxed. Your provider will remove the needle and stretch the muscle area. He or she may apply pressure to the area for 2 minutes. A bandage will be put over the injection site to prevent bleeding or an infection. What to expect after a trigger point injection:   You may feel pain relief right away. It is normal for some pain to start again 2 hours later. An ice pack or over-the-counter pain medicine can help lower the pain. You may feel sore in the injection site for a few days.  If you need another injection in the same area, wait until the area is not sore. Your healthcare provider may give you specific activity instructions to follow at home or recommend physical therapy. In general, you should try to stay active. Avoid strenuous activity for the first 3 or 4 days after the injection. Do not have more injections if you still have trigger point pain after 2 or 3 injections. Risks of a trigger point injection:  You may have a severe allergic reaction to pain medicine injected. The injection may be painful, or you may be sore where you got the injection. You may bleed, bruise, or develop an infection in the injection area. The injection may cause you to feel faint. Rarely, the needle may cause muscle or blood vessel damage or your lung may collapse if you get the injection near your chest.  Call your local emergency number (912 in the [address]), or have someone call if:   Your mouth and throat are swollen. You are wheezing or have trouble breathing. You have chest pain or your heart is beating faster than usual.    You feel like you are going to faint. When should I seek immediate care? Your face is red or swollen. You have hives that spread over your body. You feel weak or dizzy. Call your doctor or pain specialist if:   You have a fever within 1 week of the injection. You have redness or swelling within 1 week of the injection. You have new or worsening pain near the injection site. You have questions or concerns about your condition or care. Self-care:   Stay active after you have trigger point injections. Gently move your joints through their full range of motion during the first week. Avoid strenuous activity for 3 or 4 days. Do regular stretches of the trigger point muscle. Place gentle pressure on the trigger point, and then stretch the muscle. Ask your healthcare provider for more information about how to stretch and apply pressure. Apply ice to the injection site.   Use an ice pack, or put ice in a plastic bag. Cover the bag with a towel before you apply it. Apply ice for 15 to 20 minutes every hour, or as directed. Apply heat to trigger point sites. Heat can help relax muscles and relieve trigger point pain. Use a heat pack or a heating pad set on low. Apply heat for 15 minutes every hour, or as directed. Follow up with your doctor or pain specialist as directed:  Write down your questions so you remember to ask them during your visits. © Copyright [facility] 2022 Information is for End User's use only and may not be sold, redistributed or otherwise used for commercial purposes. The above information is an  only. It is not intended as medical advice for individual conditions or treatments. Talk to your doctor, nurse or pharmacist before following any medical regimen to see if it is safe and effective for you.

## 2023-07-27 ENCOUNTER — APPOINTMENT (OUTPATIENT)
Dept: PHYSICAL THERAPY | Facility: CLINIC | Age: 58
End: 2023-07-27
Payer: COMMERCIAL

## 2023-07-27 RX ORDER — LIDOCAINE HYDROCHLORIDE 10 MG/ML
4 INJECTION, SOLUTION INFILTRATION; PERINEURAL ONCE
Status: COMPLETED | OUTPATIENT
Start: 2023-07-27 | End: 2023-07-27

## 2023-07-27 RX ORDER — METHYLPREDNISOLONE ACETATE 80 MG/ML
40 INJECTION, SUSPENSION INTRA-ARTICULAR; INTRALESIONAL; INTRAMUSCULAR; SOFT TISSUE ONCE
Status: COMPLETED | OUTPATIENT
Start: 2023-07-27 | End: 2023-07-27

## 2023-07-27 RX ADMIN — METHYLPREDNISOLONE ACETATE 40 MG: 80 INJECTION, SUSPENSION INTRA-ARTICULAR; INTRALESIONAL; INTRAMUSCULAR; SOFT TISSUE at 08:22

## 2023-07-27 RX ADMIN — LIDOCAINE HYDROCHLORIDE 4 ML: 10 INJECTION, SOLUTION INFILTRATION; PERINEURAL at 08:24

## 2023-08-01 ENCOUNTER — OFFICE VISIT (OUTPATIENT)
Dept: PHYSICAL THERAPY | Facility: CLINIC | Age: 58
End: 2023-08-01
Payer: COMMERCIAL

## 2023-08-01 DIAGNOSIS — M54.2 MYOFASCIAL NECK PAIN: ICD-10-CM

## 2023-08-01 DIAGNOSIS — M54.12 RADICULOPATHY, CERVICAL: Primary | ICD-10-CM

## 2023-08-01 DIAGNOSIS — M47.22 OSTEOARTHRITIS OF SPINE WITH RADICULOPATHY, CERVICAL REGION: ICD-10-CM

## 2023-08-01 PROCEDURE — 97140 MANUAL THERAPY 1/> REGIONS: CPT | Performed by: PHYSICAL THERAPIST

## 2023-08-01 PROCEDURE — 97110 THERAPEUTIC EXERCISES: CPT | Performed by: PHYSICAL THERAPIST

## 2023-08-01 NOTE — PROGRESS NOTES
Daily Note     Today's date: 2023  Patient name: Queta Vaughn  : 1965  MRN: [id number]  Referring provider: MANDY Ace  Dx:   Encounter Diagnosis     ICD-10-CM    1. Radiculopathy, cervical  M54.12       2. Myofascial neck pain  M54.2       3. Osteoarthritis of spine with radiculopathy, cervical region  M47.22           Start Time: 1744  Stop Time:   Total time in clinic (min): 41 minutes    Subjective:  States that the injections really helped; is now scheduled for occipital injections; was not in therapy last week because she re-injured her back bending forward      Objective: See treatment diary below      Assessment: Tolerated treatment well. Decreased tightness R cervical mm. Patient would benefit from continued PT      Plan: Continue per plan of care. Precautions: none  PMH: lymphoma, thyroid dysfunction  Access Code: [email or web address]     DAILY TREATMENT RECORD    Manuals 6/13 6/15 6/20 6/22 6/27 7/6 7/11 7/20 8/1    STM/stretch/ Trp rls B cervical mm  JR   FH LQ        IASTM r cervical       JR JR JR    Cervical traction  JR           RUE nerve glides  JR JR          Stretch B UT, scalene   JR  FH LQ       Neuro Re-Ed             Cervical retraction   10x5"  10x5'' 10x5''+ ROT  10x5" 10x 5"    scap retraction   10x10"  10x10'' 10x10'' hep hep     DNF training seated   10x10" 10x10" 10x10'' 10x10'' hep hep     DNF supine w/ cuff   nv 15x10" 15x10'' HELD                                              Ther Ex             C-AROM         5x ea    suboccip stretch         5xea    Supine shld flex, abd, horiz abd 1/2 roll  No roll 10ea 10x ea 10ea 10x ea 10x ea 10 ea 10ea 10ea    Radial n.  Ball pass  10x 10x 10x 10x 10xea 10xea 10ea 10ea    Ulnar n. glides  10x 10x 10x 10x 10xea  10ea 10ea    Median nerve glides  10x 10x  10x 10x 10ea 10ea 10ea 10ea    butterflies  10x5"  10x5" 10x5'' 10x5'' 10x5" 10x5" 10x5"    Seated T-spine ext 1/2 roll             SL T-spine w/reach rotation    10x10" 10x10'' 10x10'' 10x 10" 10x 10" 10x 10"                 Ther Activity                                       Gait Training                                       Modalities

## 2023-08-03 ENCOUNTER — APPOINTMENT (OUTPATIENT)
Dept: PHYSICAL THERAPY | Facility: CLINIC | Age: 58
End: 2023-08-03
Payer: COMMERCIAL

## 2023-08-08 ENCOUNTER — APPOINTMENT (OUTPATIENT)
Dept: PHYSICAL THERAPY | Facility: CLINIC | Age: 58
End: 2023-08-08
Payer: COMMERCIAL

## 2023-08-10 ENCOUNTER — OFFICE VISIT (OUTPATIENT)
Dept: PHYSICAL THERAPY | Facility: CLINIC | Age: 58
End: 2023-08-10
Payer: COMMERCIAL

## 2023-08-10 DIAGNOSIS — M54.12 RADICULOPATHY, CERVICAL: Primary | ICD-10-CM

## 2023-08-10 PROCEDURE — 97110 THERAPEUTIC EXERCISES: CPT | Performed by: PHYSICAL THERAPIST

## 2023-08-10 PROCEDURE — 97140 MANUAL THERAPY 1/> REGIONS: CPT | Performed by: PHYSICAL THERAPIST

## 2023-08-10 NOTE — PROGRESS NOTES
PT Re-Evaluation  and PT Discharge    Today's date: 8/10/2023  Patient name: Tisha Tidwell  : 1965  MRN: [id number]  Referring provider: MANDY Navarro  Dx:   Encounter Diagnosis     ICD-10-CM    1. Radiculopathy, cervical  M54.12           Start Time:   Stop Time: 555  Total time in clinic (min): 45 minutes    Assessment/Plan    This patient demonstrates improvement since beginning therapy though she still has moderate pain at work and pain which interferes w/ daily activities. She has follow ups scheduled with PCP and specialist and is considering injections. She would like to stop therapy at this time while she pursues other treatment options. She is independent with her HEP and will continue exercises at home. She will benefit from continued PT to reduce pain and improve function. Planned Interventions:  manual therapy, ROM, stretching, strengthening, therapeutic activities, neuromuscular re-ed and instruction in HEP. Frequency and duration: discharge    STG’s: 4 weeks  1. Increase cervical ROM in rotation and B lateral flexion - MET  2. Decrease pain 2-3 levels - MET                    LTG’s: 6-8 weeks  1. Independent HEP - MET  2. Increase strength BUE to 5/5 - MET  3. Able to perform usual activities including sitting, lifting and reaching overhead without pain  > 0-3/10 - progress          Subjective  This is a 62 y.o. female who reports many year hx of neck pain which has 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. Has constant HA's, neck pain, unable to do anything for any amount of time due to neck pain. Current symptoms: R posterior neck, burning pain, HA base of the neck and up over top of head; has not had any arm symptoms; does not awakens due to neck pain. Feels weak when lifting overhead.   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; has headset and standing desk but they don't help  Leisure:  Considering starting yoga    Patient’s goal:  To not have pain    Objective  Pain scale:  0-5/10 on work days; states that she doesn't usually get neck pain on weekends    Cervical ROM:                                           Flexion AROM   WNL           Extension  AROM     WNL  pain       R lateral flexion AROM   35        L lateral flexion AROM   27         R rotation AROM    60        L rotation AROM    65        Strength   Shoulder              R   AROM   L     R  PROM  L     R  Strength  L  Flexion   5/5           5/5   Extension      Abd   5/5           5/5   ER   5/5           5/5   IR        Elbow   AROM  PROM  Strength  Flexion   5/5          5/5   Extension   5/5          5/5     Wrist   AROM  PROM  Strength  flexion   5/5           5/5   extension   5/5           5/5     B shoulder ROM is [facility]      Palpation    Tender/ tight R cervical paraspinals R UT     Precautions: none  PMH: lymphoma, thyroid dysfunction  Access Code: [email or web address]     DAILY TREATMENT RECORD    Manuals 6/13 6/15 6/20 6/22 6/27 7/6 7/11 7/20 8/1 8/10   STM/stretch/ Trp rls B cervical mm  JR   FH LQ        IASTM r cervical       JR JR JR JR   Cervical traction  JR           RUE nerve glides  JR JR          Stretch B UT, scalene   JR  FH LQ       Neuro Re-Ed             Cervical retraction   10x5"  10x5'' 10x5''+ ROT  10x5" 10x 5" 10x 5"   scap retraction   10x10"  10x10'' 10x10'' hep hep     DNF training seated   10x10" 10x10" 10x10'' 10x10'' hep hep     DNF supine w/ cuff   nv 15x10" 15x10'' HELD                                              Ther Ex             C-AROM         5x ea 5xea   suboccip stretch 5xea    Supine shld flex, abd, horiz abd 1/2 roll  No roll 10ea 10x ea 10ea 10x ea 10x ea 10 ea 10ea 10ea 10ea   Radial n.  Ball pass  10x 10x 10x 10x 10xea 10xea 10ea 10ea    Ulnar n. glides  10x 10x 10x 10x 10xea  10ea 10ea    Median nerve glides  10x 10x  10x 10x 10ea 10ea 10ea 10ea    butterflies  10x5"  10x5" 10x5'' 10x5'' 10x5" 10x5" 10x5"    Seated T-spine ext 1/2 roll             SL T-spine w/reach rotation    10x10" 10x10'' 10x10'' 10x 10" 10x 10" 10x 10" 10x                Ther Activity                                       Gait Training                                       Modalities

## 2023-08-24 DIAGNOSIS — G43.009 MIGRAINE WITHOUT AURA AND WITHOUT STATUS MIGRAINOSUS, NOT INTRACTABLE: ICD-10-CM

## 2023-08-24 DIAGNOSIS — F32.9 REACTIVE DEPRESSION: ICD-10-CM

## 2023-08-24 DIAGNOSIS — G89.4 CHRONIC PAIN SYNDROME: ICD-10-CM

## 2023-08-24 RX ORDER — DULOXETIN HYDROCHLORIDE 20 MG/1
CAPSULE, DELAYED RELEASE ORAL
Qty: 90 CAPSULE | Refills: 0 | Status: SHIPPED | OUTPATIENT
Start: 2023-08-24

## 2023-08-28 ENCOUNTER — TELEPHONE (OUTPATIENT)
Dept: NEUROLOGY | Facility: CLINIC | Age: 58
End: 2023-08-28

## 2023-08-28 NOTE — TELEPHONE ENCOUNTER
Hello,     I have called the patient to re-schedule appt request no answer, LMOM.     Thank you,     Tomas Sung

## 2023-08-28 NOTE — TELEPHONE ENCOUNTER
Hello,     I have called the patient to re-schedule appt request no answer, LMOM.     Thank you,     Catherine Sanz

## 2023-08-31 DIAGNOSIS — G43.009 MIGRAINE WITHOUT AURA AND WITHOUT STATUS MIGRAINOSUS, NOT INTRACTABLE: ICD-10-CM

## 2023-08-31 RX ORDER — ZOLMITRIPTAN 2.5 MG/1
2.5 TABLET, ORALLY DISINTEGRATING ORAL DAILY PRN
Qty: 18 TABLET | Refills: 3 | Status: SHIPPED | OUTPATIENT
Start: 2023-08-31 | End: 2023-11-29

## 2023-09-01 DIAGNOSIS — G43.009 MIGRAINE WITHOUT AURA AND WITHOUT STATUS MIGRAINOSUS, NOT INTRACTABLE: ICD-10-CM

## 2023-09-01 RX ORDER — ZOLMITRIPTAN 2.5 MG/1
2.5 TABLET, ORALLY DISINTEGRATING ORAL DAILY PRN
Qty: 18 TABLET | Refills: 3 | OUTPATIENT
Start: 2023-09-01 | End: 2023-11-30

## 2023-09-01 RX ORDER — ZOLMITRIPTAN 2.5 MG/1
TABLET, ORALLY DISINTEGRATING ORAL
Qty: 18 TABLET | Refills: 3 | OUTPATIENT
Start: 2023-09-01

## 2023-09-05 ENCOUNTER — OFFICE VISIT (OUTPATIENT)
Dept: NEUROSURGERY | Facility: CLINIC | Age: 58
End: 2023-09-05
Payer: COMMERCIAL

## 2023-09-05 VITALS
HEIGHT: 65 IN | BODY MASS INDEX: 30.66 KG/M2 | OXYGEN SATURATION: 98 % | SYSTOLIC BLOOD PRESSURE: 96 MMHG | TEMPERATURE: 97.9 F | DIASTOLIC BLOOD PRESSURE: 64 MMHG | HEART RATE: 78 BPM | WEIGHT: 184 LBS

## 2023-09-05 DIAGNOSIS — M54.2 NECK PAIN: Primary | ICD-10-CM

## 2023-09-05 DIAGNOSIS — M54.12 RADICULOPATHY, CERVICAL: ICD-10-CM

## 2023-09-05 PROCEDURE — 99214 OFFICE O/P EST MOD 30 MIN: CPT | Performed by: PHYSICIAN ASSISTANT

## 2023-09-05 NOTE — ASSESSMENT & PLAN NOTE
Pt presents to the  nsgy office again today for continued follow-up in regard to her chronic neck pain with intermittent RUE radiculopathy and to review her recent EMG studies. 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, at a desk all day    - states that when working her sx return and worsen throughout the week, on the weekend/days off her sx resolve   - reports improvement with ergonomic chair and arm support at her desk, as well as a standing desk but not resolution of her sx   - completed 6 weeks of PT with some improvement --> continuing exercises at home   - has been following with pain management    - underwent trigger point injections with temporary relief x about 1 week    - sx returned with continued work  - pt has been speaking with her manager about transitioning to a part time role or trying to break her scheduled up with 2 days on, 1 day off to recover, and 2 days on again. As she states with one day off, her sx significantly improve. 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 She states her sx are slightly better with PT and PM but overall unchanged.  Denies any weakness, persistent numbness/tingling, recent injuries, BBI, urinary retention, inability/difficulty walking, etc.   - current exam: neuro intact     Imaging:   · 7/18/2023 RUE EMG: abnormal study, indicative of an old, mild R C7 radiculopathy with completed reinnervation by collateral spotting of the surviving axons.  The minimal abnormalities in the right middle and anterior deltoid with weakness in these heads of deltoid may suggest possible old involvement of the anterior division of the axillary nerve and the quadrilateral space with completed reinnervation. This is not due to either C5 or C6 radiculopathy as she has no abnormality in the biceps, pronator teres or rhomboid. There is no electrodiagnostic evidence of brachial plexopathy, peripheral polyneuropathy, or myopathy on this study. There is clinical evidence for tightness and possible mild fasciitis of the upper trapezius extension to the neck and levator scapulae on the right side. To be correlated clinically. · 5/8/2023 MRI c-spine: Multilevel cervical spondylosis, as described above. Multifactorial disease results in severe left foraminal narrowing at C5-C6 and C6-C7. Minimal flattening of the left ventral aspect of the cord is present at C5-C6. Plan:   · Patient encouraged to continue to closely monitor neurological exam and symptoms  · Pt educated on "red flag" s/sx to monitor for including weakness, persistent numbness, BBI, urinary retention, frequent falls, inability to walk/difficulty walking, etc.  · Imaging and EMG results reviewed at length with patient  · EMG does reveal evidence of a chronic C7 radiculopathy that could possibly explain some of the patient's symptoms but does not explain them entirely. · Discussed at length with patient that no neurosurgical intervention would likely improve her symptoms. Especially given that her symptoms are associated with work/posture at work and resolved after a day or two off. · Patient not interested in any neurosurgical intervention anyway at this time. · Recommended continued conservative management  · Continue to follow-up with pain management and consider additional trigger point injections as the patient got relief with this intervention.   · Recommended ongoing physical therapy and continue home stretching and workouts   · Agree with plan to discuss with patient's manager in regards to rearranging her work schedule to help better accommodate her pain control and health  · We will plan to follow-up with the patient on an as-needed basis or should she experience worsening symptoms/"red flag" s/sx  Pt agreeable to the above noted plan   All questions answered at this time   Pt encouraged to call the office with any further questions or concerns or should they experience any worsening sx

## 2023-09-05 NOTE — PROGRESS NOTES
Neurosurgery Office Note  Desiree Read 62 y.o. female MRN: [id number]    Assessment/Plan   Neck pain  Pt presents to the  nsgy office again today for continued follow-up in regard to her chronic neck pain with intermittent RUE radiculopathy and to review her recent EMG studies. 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, at a desk all day    - states that when working her sx return and worsen throughout the week, on the weekend/days off her sx resolve   - reports improvement with ergonomic chair and arm support at her desk, as well as a standing desk but not resolution of her sx   - completed 6 weeks of PT with some improvement --> continuing exercises at home   - has been following with pain management    - underwent trigger point injections with temporary relief x about 1 week    - sx returned with continued work  - pt has been speaking with her manager about transitioning to a part time role or trying to break her scheduled up with 2 days on, 1 day off to recover, and 2 days on again. As she states with one day off, her sx significantly improve. 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 She states her sx are slightly better with PT and PM but overall unchanged.  Denies any weakness, persistent numbness/tingling, recent injuries, BBI, urinary retention, inability/difficulty walking, etc.   - current exam: neuro intact     Imaging:   · 7/18/2023 RUE EMG: abnormal study, indicative of an old, mild R C7 radiculopathy with completed reinnervation by collateral spotting of the surviving axons. The minimal abnormalities in the right middle and anterior deltoid with weakness in these heads of deltoid may suggest possible old involvement of the anterior division of the axillary nerve and the quadrilateral space with completed reinnervation. This is not due to either C5 or C6 radiculopathy as she has no abnormality in the biceps, pronator teres or rhomboid. There is no electrodiagnostic evidence of brachial plexopathy, peripheral polyneuropathy, or myopathy on this study. There is clinical evidence for tightness and possible mild fasciitis of the upper trapezius extension to the neck and levator scapulae on the right side. To be correlated clinically. · 5/8/2023 MRI c-spine: Multilevel cervical spondylosis, as described above. Multifactorial disease results in severe left foraminal narrowing at C5-C6 and C6-C7. Minimal flattening of the left ventral aspect of the cord is present at C5-C6. Plan:   · Patient encouraged to continue to closely monitor neurological exam and symptoms  · Pt educated on "red flag" s/sx to monitor for including weakness, persistent numbness, BBI, urinary retention, frequent falls, inability to walk/difficulty walking, etc.  · Imaging and EMG results reviewed at length with patient  · EMG does reveal evidence of a chronic C7 radiculopathy that could possibly explain some of the patient's symptoms but does not explain them entirely. · Discussed at length with patient that no neurosurgical intervention would likely improve her symptoms. Especially given that her symptoms are associated with work/posture at work and resolved after a day or two off. · Patient not interested in any neurosurgical intervention anyway at this time.   · Recommended continued conservative management  · Continue to follow-up with pain management and consider additional trigger point injections as the patient got relief with this intervention. · Recommended ongoing physical therapy and continue home stretching and workouts   · Agree with plan to discuss with patient's manager in regards to rearranging her work schedule to help better accommodate her pain control and health  · We will plan to follow-up with the patient on an as-needed basis or should she experience worsening symptoms/"red flag" s/sx  Pt agreeable to the above noted plan   All questions answered at this time   Pt encouraged to call the office with any further questions or concerns or should they experience any worsening sx     Diagnoses and all orders for this visit:    Neck pain  -     Ambulatory referral to Chiropractic; Future    Radiculopathy, cervical        I have spent a total time of 35 minutes on 09/05/23 in caring for this patient including Diagnostic results, Prognosis, Risks and benefits of tx options, Instructions for management, Patient and family education, Importance of tx compliance, Risk factor reductions, Impressions, Counseling / Coordination of care, Documenting in the medical record, Reviewing / ordering tests, medicine, procedures   and Obtaining or reviewing history  . CHIEF COMPLAINT    Chief Complaint   Patient presents with   • Follow-up     Plan   Reviewed MRI cervical Ordered EMG RUE   RTO or virtual visit after EMG completion   Referral to pain management -Dr Sunshine Shafer.  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 Patient reports long time hx of chronic neck pain, reports intermittent radiation of pain to her RUE along the deltoid and the posterior/lateral aspect of the RUE. She reports N/T to a similar distribution (but also her entire R arm) when her arm is positioned a certain way or when carrying items > 10lbs with the RUE. Pt reports a clear association of these sx with her work. 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 She states that when working her sx return and worsen throughout the week, on the weekend/days off her sx resolve. She reports improvement with ergonomic chair and arm support at her desk, as well as a standing desk but not resolution of her sx. She has completed 6 weeks of PT with some improvement in her pain. She continues to complete the exercises at home with again just little relief. She has been following with pain management and underwent trigger point injections with temporary relief x about 1 week. However, unfortunately, he sx returned after a few days in a row of working. Pt has been speaking with her manager about transitioning to a part time role or trying to break her schedule up with 2 days on, 1 day off to recover, and 2 days on again. As she states with one day off in between, her sx significantly improve and she is able to wrk 2 days tank row without significant pain or difficulty. 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 Today, pt offers no new complaints. She states her sx are slightly better with PT and PM but overall unchanged.  Denies any weakness, persistent numbness/tingling, recent injuries, BBI, urinary retention, inability/difficulty walking, etc.     See Discussion      REVIEW OF SYSTEMS  Review of Systems   Eyes: Positive for visual disturbance (double when staring at the scren all the time, baseline visual difficulty and wears glasses, following with ophtho at [facility]). Gastrointestinal: Negative. Endocrine:        Lymp node removal left side of neck . Suffered from nerve damage in her left arm from the surgery    Genitourinary: Negative. Musculoskeletal: Positive for myalgias (muscle pain/ spams /tightness in her neck and right shoulder ) and neck pain (radiates into the back of her head and down into her right shoulder). Negative for gait problem and neck stiffness. Pain and burning sensation in her neck and down into her shoulder       Neck pain for several years, worse since oct 2020. Completed 6 weeks of PT  S/p trigger point injections with temporary relief  No chiropractor   No pervious spinal surgery    Neurological: Positive for numbness (bilateral arm numbness ( Numbness is largely resolved after physical therapy), still on occasion when holding > 10lbs) and headaches (constant , hx of migraines ). Negative for dizziness, tremors, seizures and weakness. ROS obtained by MA. Reviewed. See HPI.        Meds/Allergies   Current Outpatient Medications   Medication Sig Dispense Refill   • calcium carbonate (Tums Ultra 1000) 1000 MG chewable tablet Takes 1 as needed     • DULoxetine (CYMBALTA) 20 mg capsule TAKE 1 CAPSULE BY MOUTH EVERY DAY 90 capsule 0   • estradiol (ESTRACE) 0.5 MG tablet Take 0.5 mg by mouth daily      • famotidine (PEPCID) 20 mg tablet 1 as needed for break through     • ibuprofen (MOTRIN) 200 mg tablet Take 600 mg by mouth every 6 (six) hours as needed for mild pain     • levothyroxine 125 mcg tablet Take 1 tablet 6 days a week and 1/2 tablet on sunday (Patient taking differently: Take 1 tablet 6 days a week) 90 tablet 3   • Lutein 20 MG CAPS Take by mouth daily     • Multiple Vitamins-Minerals (MULTIVITAMIN GUMMIES WOMENS) CHEW Chew 2 each daily • NON FORMULARY Cspsules--take 1 at bedtime     • omeprazole (PriLOSEC) 40 MG capsule Take 1 capsule (40 mg total) by mouth daily 90 capsule 12   • verapamil (CALAN-SR) 120 mg CR tablet Take 120 mg by mouth     • VITAMIN D, ERGOCALCIFEROL, PO Take 5,000 Units by mouth daily     • ZOLMitriptan (ZOMIG-ZMT) 2.5 MG disintegrating tablet Take 1 tablet (2.5 mg total) by mouth daily as needed for migraine 18 tablet 3     No current facility-administered medications for this visit. Allergies   Allergen Reactions   • Latex Rash and Hives   • Iodinated Contrast Media Sneezing     Sneezing, tongue tingling  Sneezing, tongue tingling     • Gabapentin Edema   • Lyrica [Pregabalin] Edema   • Other      Other reaction(s): hair dye causes rash  Orange coating   • Rituximab Itching and Other (See Comments)     Felt hot all over and restless   • Lansoprazole Rash     PAST HISTORY  Past Medical History:   Diagnosis Date   • Cancer ([address])     non-hogikins lymphoma    • Colon polyp    • Convergence insufficiency     bilateral eyes.    • COVID-19 01/2022   • Disease of thyroid gland    • Endometrial hyperplasia    • Enhanced S-cone syndrome     eyes   • Follicular lymphoma ([address]) 04/2020   • GERD (gastroesophageal reflux disease)    • Irregular heart beat     SVT   • Non Hodgkin's lymphoma ([address]) 2020    stage 1, had surgery and XRT to the neck( finished july 2020), rituximab in falll 2022 for recurrence in sinus   • Overweight    • Retinitis pigmentosa      Past Surgical History:   Procedure Laterality Date   • ANAL SPHINCTEROPLASTY     • ANAL/SPHINCTEROPLASTY     • ANTERIOR AND POSTERIOR VAGINAL REPAIR     • BLADDER SUSPENSION     • COLONOSCOPY     • DILATION AND CURETTAGE OF UTERUS      X 2   • HYSTERECTOMY  2014   • OOPHORECTOMY Bilateral 2014    with hysterectomy   • TONSILLECTOMY AND ADENOIDECTOMY     • UPPER GASTROINTESTINAL ENDOSCOPY     • [address] LEFT  04/21/2020     Social History     Tobacco Use   • Smoking status: Never   • Smokeless tobacco: Never   Vaping Use   • Vaping Use: Never used   Substance Use Topics   • Alcohol use: Yes     Comment: rare   • Drug use: Yes     Types: Marijuana     Comment: help to sleep, medical marijuana card       Family History   Problem Relation Age of Onset   • Cancer Mother         lung   • Lung cancer Mother 61   • COPD Father    • Substance Abuse Sister    • HIV Sister    • No Known Problems Daughter    • No Known Problems Maternal Grandmother    • Throat cancer Maternal Grandfather 68   • Other Maternal Grandfather         Aortic valve replacement    • No Known Problems Paternal Grandmother    • No Known Problems Paternal Grandfather    • Diabetes type II Brother         on insulin   • No Known Problems Paternal Aunt    • Mental illness Neg Hx    • Colon cancer Neg Hx    • Colon polyps Neg Hx      Above history personally reviewed. EXAM  Vitals:Blood pressure 96/64, pulse 78, temperature 97.9 °F (36.6 °C), temperature source Temporal, height 5' 5" (1.651 m), weight 83.5 kg (184 lb), SpO2 98 %. ,Body mass index is 30.62 kg/m². Physical Exam  Constitutional:       General: She is not in acute distress. Appearance: Normal appearance. She is normal weight. She is not ill-appearing or toxic-appearing. Comments: Pleasant, middle-aged female sitting comfortably in the chair  No acute distress   HENT:      Head: Normocephalic and atraumatic. Right Ear: External ear normal.      Left Ear: External ear normal.      Nose: Nose normal. No congestion or rhinorrhea. Mouth/Throat:      Mouth: Mucous membranes are moist.   Eyes:      General: No scleral icterus. Right eye: No discharge. Left eye: No discharge. Extraocular Movements: Extraocular movements intact. Conjunctiva/sclera: Conjunctivae normal.      Pupils: Pupils are equal, round, and reactive to light. Cardiovascular:      Rate and Rhythm: Normal rate.    Pulmonary: Effort: Pulmonary effort is normal. No respiratory distress. Comments: No respiratory distress on room air  Abdominal:      General: Abdomen is flat. Musculoskeletal:         General: No deformity or signs of injury. Normal range of motion. Cervical back: Normal range of motion and neck supple. No rigidity or tenderness. Right lower leg: No edema. Left lower leg: No edema. Skin:     General: Skin is warm and dry. Capillary Refill: Capillary refill takes less than 2 seconds. Neurological:      General: No focal deficit present. Mental Status: She is alert and oriented to person, place, and time. Mental status is at baseline. Cranial Nerves: No cranial nerve deficit. Sensory: No sensory deficit. Motor: No weakness. Coordination: Coordination normal.      Gait: Gait normal.      Deep Tendon Reflexes: Reflexes normal.      Comments: GCS 15   A&Ox3   Appropriately answering questions and following commands   No dysarthria or aphasia   No appreciated CN deficits   Strength 5/5 throughout to daniela UE and daniela LE   Sensation intact to light touch to daniela UE and daniela LE   No drift or ataxia appreciated daniela   No vasquez's or clonus noted daniela   Reflexes 2+ throughout   Psychiatric:         Mood and Affect: Mood normal.         Behavior: Behavior normal.         Thought Content: Thought content normal.         Judgment: Judgment normal.       Neurologic Exam     Mental Status   Oriented to person, place, and time. Cranial Nerves     CN III, IV, VI   Pupils are equal, round, and reactive to light. MEDICAL DECISION MAKING  Imaging Studies:   - see above for MRI and EMG results. I have personally reviewed pertinent reports.    and I have personally reviewed pertinent films in PACS

## 2023-10-17 ENCOUNTER — OFFICE VISIT (OUTPATIENT)
Dept: FAMILY MEDICINE CLINIC | Facility: HOSPITAL | Age: 58
End: 2023-10-17
Payer: COMMERCIAL

## 2023-10-17 VITALS
SYSTOLIC BLOOD PRESSURE: 102 MMHG | OXYGEN SATURATION: 98 % | WEIGHT: 190 LBS | DIASTOLIC BLOOD PRESSURE: 68 MMHG | HEART RATE: 77 BPM | BODY MASS INDEX: 31.65 KG/M2 | HEIGHT: 65 IN

## 2023-10-17 DIAGNOSIS — Z23 NEED FOR IMMUNIZATION AGAINST INFLUENZA: ICD-10-CM

## 2023-10-17 DIAGNOSIS — C82.31 GRADE 3A FOLLICULAR LYMPHOMA OF LYMPH NODES OF NECK (HCC): ICD-10-CM

## 2023-10-17 DIAGNOSIS — K21.00 GASTROESOPHAGEAL REFLUX DISEASE WITH ESOPHAGITIS, UNSPECIFIED WHETHER HEMORRHAGE: ICD-10-CM

## 2023-10-17 DIAGNOSIS — G43.009 MIGRAINE WITHOUT AURA AND WITHOUT STATUS MIGRAINOSUS, NOT INTRACTABLE: Primary | ICD-10-CM

## 2023-10-17 DIAGNOSIS — C82.39 GRADE 3A FOLLICULAR LYMPHOMA OF EXTRANODAL SITE EXCLUDING SPLEEN AND OTHER SOLID ORGANS (HCC): ICD-10-CM

## 2023-10-17 PROCEDURE — 90471 IMMUNIZATION ADMIN: CPT

## 2023-10-17 PROCEDURE — 90686 IIV4 VACC NO PRSV 0.5 ML IM: CPT

## 2023-10-17 PROCEDURE — 99214 OFFICE O/P EST MOD 30 MIN: CPT | Performed by: INTERNAL MEDICINE

## 2023-10-17 NOTE — ASSESSMENT & PLAN NOTE
Had ct and scans done  with [facility]- had bx  done July 2022-- had ear pain- was lymphoma in naso pharynx- had in neck  2020 April - finished rituximab in setp 2022- had upcoming appt with the ent head and neck surgery.  - has scar tissues

## 2023-10-17 NOTE — PROGRESS NOTES
Assessment/Plan:     Diagnosis ICD-10-CM Associated Orders   1. Need for immunization against influenza  Z23 influenza vaccine, quadrivalent, 0.5 mL, preservative-free, for adult and pediatric patients 6 mos+ (AFLURIA, FLUARIX, FLULAVAL, FLUZONE)      2. Migraine without aura and without status migrainosus, not intractable  G43.009       3. 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)  C82.31       4. Grade 3a follicular lymphoma of extranodal site excluding spleen and other solid organs [facility])  C82.39           Problem List Items Addressed This Visit          Cardiovascular and Mediastinum    Migraine without aura and without status migrainosus, not intractable     Had ct and scans done  with [facility]- had bx  done July 2022-- had ear pain- was lymphoma in naso pharynx- had in neck  2020 April - finished rituximab in setp 2022- had upcoming appt with the ent head and neck surgery. 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 Subjective:    Patient ID: Yordy Hooker is a 62 y.o. female    Here for follow up   Had great vacation in [facility] for anniversary   1. Eye strain and increased vision issues Working 4 -10 hour days with eye strain issues- has used blue light glasses etc but still problamatic     2.  Cervical djd-Has seen neurosurgery for daily headaches- they did PT for neck issues  and did not feel that her headaches were due to the djd of the neck-   Needs fmla redone for her migraines    To see [place] [facility]- has  hazy vision- will need a specialized team  due to her congential eye issues  if cataract surgery is needed  - unable to drive due to her eyes     3. Right sinus a pain Had recent work up-   With ct of chest abd pelvis and neck --will see oncology end of October- has clear drainage from rigth nares- has some occasional traces of blood. She feels similar to when she had recurrent lymphoma in her right sinuses  4. Painissues- will consider restarting duloxetine and gradually increase if not  mproving          The following portions of the patient's history were reviewed and updated as appropriate: allergies, current medications and problem list.     Review of Systems   HENT:  Positive for congestion, sinus pressure and sinus pain. Eyes:  Positive for visual disturbance. Chronic congenital   Respiratory:  Negative for cough and shortness of breath. Gastrointestinal: Negative. Musculoskeletal:  Positive for neck pain. Had been on duloxitene and weaned off   All other systems reviewed and are negative.         Objective:      Current Outpatient Medications:     calcium carbonate (Tums Ultra 1000) 1000 MG chewable tablet, Takes 1 as needed, Disp: , Rfl:     estradiol (ESTRACE) 0.5 MG tablet, Take 0.5 mg by mouth daily , Disp: , Rfl:     ibuprofen (MOTRIN) 200 mg tablet, Take 600 mg by mouth every 6 (six) hours as needed for mild pain, Disp: , Rfl:     Lutein 20 MG CAPS, Take by mouth daily, Disp: , Rfl:     Multiple Vitamins-Minerals (MULTIVITAMIN GUMMIES WOMENS) CHEW, Chew 2 each daily, Disp: , Rfl:     NON FORMULARY, Cspsules--take 1 at bedtime, Disp: , Rfl:     omeprazole (PriLOSEC) 40 MG capsule, Take 1 capsule (40 mg total) by mouth daily, Disp: 90 capsule, Rfl: 12    verapamil (CALAN-SR) 120 mg CR tablet, Take 120 mg by mouth, Disp: , Rfl:     VITAMIN D, ERGOCALCIFEROL, PO, Take 5,000 Units by mouth daily, Disp: , Rfl:     ZOLMitriptan (ZOMIG-ZMT) 2.5 MG disintegrating tablet, Take 1 tablet (2.5 mg total) by mouth daily as needed for migraine, Disp: 18 tablet, Rfl: 3    DULoxetine (CYMBALTA) 20 mg capsule, TAKE 1 CAPSULE BY MOUTH EVERY DAY (Patient not taking: Reported on 10/17/2023), Disp: 90 capsule, Rfl: 0    famotidine (PEPCID) 20 mg tablet, 1 as needed for break through (Patient not taking: Reported on 10/17/2023), Disp: , Rfl:     levothyroxine 125 mcg tablet, Take 1 tablet 6 days a week and 1/2 tablet on sunday (Patient taking differently: Take 1 tablet 6 days a week), Disp: 90 tablet, Rfl: 3    Blood pressure 102/68, pulse 77, height 5' 5" (1.651 m), weight 86.2 kg (190 lb), SpO2 98 %. Physical Exam  Vitals and nursing note reviewed. Constitutional:       General: She is not in acute distress. HENT:      Head: Normocephalic and atraumatic. Right Ear: Tympanic membrane normal. There is no impacted cerumen. Left Ear: Tympanic membrane normal. There is no impacted cerumen. Nose: Congestion present. Comments: No discolored drainage     Mouth/Throat:      Pharynx: No oropharyngeal exudate. Eyes:      General:         Right eye: No discharge. Left eye: No discharge. Cardiovascular:      Rate and Rhythm: Normal rate and regular rhythm. Heart sounds: No murmur heard. Pulmonary:      Breath sounds: No wheezing or rhonchi. Abdominal:      Palpations: Abdomen is soft. Tenderness: There is no abdominal tenderness. Musculoskeletal:         General: No tenderness. Right lower leg: No edema. Left lower leg: No edema. Skin:     Findings: No erythema or rash. Comments: Right buttocks tenderness with recent hematoma   Neurological:      General: No focal deficit present. Mental Status: She is alert. Psychiatric:         Mood and Affect: Mood normal.         Thought Content:  Thought content normal.         Judgment: Judgment normal.

## 2023-11-27 ENCOUNTER — TELEPHONE (OUTPATIENT)
Dept: FAMILY MEDICINE CLINIC | Facility: HOSPITAL | Age: 58
End: 2023-11-27

## 2023-11-27 NOTE — TELEPHONE ENCOUNTER
Pt states she believes she has shingles. She has scabs in her hairline and some spots around left ear. Pt borrowed from a friend and took Valcyclovir 1000 grams twice a day since Friday. She has no more pills. Can something be ordered?   Pt can be reached at [phone]

## 2023-11-28 ENCOUNTER — OFFICE VISIT (OUTPATIENT)
Dept: URGENT CARE | Facility: CLINIC | Age: 58
End: 2023-11-28
Payer: COMMERCIAL

## 2023-11-28 VITALS
TEMPERATURE: 97.9 F | HEART RATE: 77 BPM | BODY MASS INDEX: 31.99 KG/M2 | WEIGHT: 192 LBS | OXYGEN SATURATION: 97 % | HEIGHT: 65 IN | SYSTOLIC BLOOD PRESSURE: 108 MMHG | RESPIRATION RATE: 18 BRPM | DIASTOLIC BLOOD PRESSURE: 62 MMHG

## 2023-11-28 DIAGNOSIS — B02.9 HERPES ZOSTER WITHOUT COMPLICATION: Primary | ICD-10-CM

## 2023-11-28 PROCEDURE — 99213 OFFICE O/P EST LOW 20 MIN: CPT | Performed by: FAMILY MEDICINE

## 2023-11-28 RX ORDER — VALACYCLOVIR HYDROCHLORIDE 1 G/1
1000 TABLET, FILM COATED ORAL 2 TIMES DAILY
Qty: 14 TABLET | Refills: 3 | Status: SHIPPED | OUTPATIENT
Start: 2023-11-28 | End: 2023-12-05

## 2023-11-28 NOTE — PROGRESS NOTES
[facility]        NAME: Nghia Bynum is a 62 y.o. female  : 1965    MRN: [id number]  DATE: 2023  TIME: 8:49 AM    Assessment and Plan   Herpes zoster without complication [I78.3]  1. Herpes zoster without complication  valACYclovir (VALTREX) 1,000 mg tablet            Patient Instructions       Follow up with PCP in 3-5 days. Proceed to  ER if symptoms worsen. Chief Complaint     Chief Complaint   Patient presents with    Rash     Pt presents with shingles type rash on left side of scalp into left ear that started x 1 week ago. She states she started valcyclover x 1 week ago and is running out of medications. History of Present Illness       51-year-old female presenting with rash on her left forehead for the past 1 week. She does report burning and itchiness sensation and this started Valtrex for the past 4 days, she states that Valtrex did help however she has since run out and requests a refill. Review of Systems   Review of Systems   Constitutional: Negative. HENT: Negative. Eyes: Negative. Respiratory: Negative. Cardiovascular: Negative. Gastrointestinal: Negative. Endocrine: Negative. Genitourinary: Negative. Musculoskeletal: Negative. Skin:  Positive for rash. Allergic/Immunologic: Negative. Neurological: Negative. Hematological: Negative. Psychiatric/Behavioral: Negative.            Current Medications       Current Outpatient Medications:     calcium carbonate (Tums Ultra 1000) 1000 MG chewable tablet, Takes 1 as needed, Disp: , Rfl:     estradiol (ESTRACE) 0.5 MG tablet, Take 0.5 mg by mouth daily , Disp: , Rfl:     ibuprofen (MOTRIN) 200 mg tablet, Take 600 mg by mouth every 6 (six) hours as needed for mild pain, Disp: , Rfl:     Lutein 20 MG CAPS, Take by mouth daily, Disp: , Rfl:     Multiple Vitamins-Minerals (MULTIVITAMIN GUMMIES WOMENS) CHEW, Chew 2 each daily, Disp: , Rfl:     omeprazole (PriLOSEC) 40 MG capsule, Take 1 capsule (40 mg total) by mouth daily, Disp: 90 capsule, Rfl: 12    valACYclovir (VALTREX) 1,000 mg tablet, Take 1 tablet (1,000 mg total) by mouth 2 (two) times a day for 7 days, Disp: 14 tablet, Rfl: 3    verapamil (CALAN-SR) 120 mg CR tablet, Take 120 mg by mouth, Disp: , Rfl:     VITAMIN D, ERGOCALCIFEROL, PO, Take 5,000 Units by mouth daily, Disp: , Rfl:     ZOLMitriptan (ZOMIG-ZMT) 2.5 MG disintegrating tablet, Take 1 tablet (2.5 mg total) by mouth daily as needed for migraine, Disp: 18 tablet, Rfl: 3    DULoxetine (CYMBALTA) 20 mg capsule, TAKE 1 CAPSULE BY MOUTH EVERY DAY (Patient not taking: Reported on 10/17/2023), Disp: 90 capsule, Rfl: 0    famotidine (PEPCID) 20 mg tablet, 1 as needed for break through (Patient not taking: Reported on 10/17/2023), Disp: , Rfl:     levothyroxine 125 mcg tablet, Take 1 tablet 6 days a week and 1/2 tablet on sunday (Patient taking differently: Take 1 tablet 6 days a week), Disp: 90 tablet, Rfl: 3    NON FORMULARY, Cspsules--take 1 at bedtime (Patient not taking: Reported on 11/28/2023), Disp: , Rfl:     Current Allergies     Allergies as of 11/28/2023 - Reviewed 11/28/2023   Allergen Reaction Noted    Latex Rash and Hives 03/06/2015    Iodinated contrast media Sneezing 08/13/2020    Gabapentin Edema 05/02/2023    Lyrica [pregabalin] Edema 05/02/2023    Other  03/06/2015    Rituximab Itching and Other (See Comments) 08/12/2022    Lansoprazole Rash 03/06/2015            The following portions of the patient's history were reviewed and updated as appropriate: allergies, current medications, past family history, past medical history, past social history, past surgical history and problem list.     Past Medical History:   Diagnosis Date    Cancer ([address])     non-hogikins lymphoma     Colon polyp     Convergence insufficiency     bilateral eyes.     COVID-19 01/2022    Disease of thyroid gland     Endometrial hyperplasia     Enhanced S-cone syndrome     eyes Follicular lymphoma ([address] [facility]) 04/2020    GERD (gastroesophageal reflux disease)     Irregular heart beat     SVT    Non Hodgkin's lymphoma ([address] ) 2020    stage 1, had surgery and XRT to the neck( finished july 2020), rituximab in falll 2022 for recurrence in sinus    Overweight     Retinitis pigmentosa        Past Surgical History:   Procedure Laterality Date    ANAL SPHINCTEROPLASTY      ANAL/SPHINCTEROPLASTY      ANTERIOR AND POSTERIOR VAGINAL REPAIR      BLADDER SUSPENSION      COLONOSCOPY      DILATION AND CURETTAGE OF UTERUS      X 2    HYSTERECTOMY  2014    OOPHORECTOMY Bilateral 2014    with hysterectomy    TONSILLECTOMY AND ADENOIDECTOMY      UPPER GASTROINTESTINAL ENDOSCOPY      US GUIDED LYMPH NODE BIOPSY LEFT  04/21/2020       Family History   Problem Relation Age of Onset    Cancer Mother         lung    Lung cancer Mother 61    COPD Father     Substance Abuse Sister     HIV Sister     No Known Problems Daughter     No Known Problems Maternal Grandmother     Throat cancer Maternal Grandfather 68    Other Maternal Grandfather         Aortic valve replacement     No Known Problems Paternal Grandmother     No Known Problems Paternal Grandfather     Diabetes type II Brother         on insulin    No Known Problems Paternal Aunt     Mental illness Neg Hx     Colon cancer Neg Hx     Colon polyps Neg Hx          Medications have been verified. Objective   /62   Pulse 77   Temp 97.9 °F (36.6 °C)   Resp 18   Ht 5' 5" (1.651 m)   Wt 87.1 kg (192 lb)   LMP  (LMP Unknown)   SpO2 97%   BMI 31.95 kg/m²   No LMP recorded (lmp unknown). Patient has had a hysterectomy. Physical Exam     Physical Exam  Vitals and nursing note reviewed. Constitutional:       Appearance: She is well-developed. HENT:      Head: Normocephalic. Nose: Nose normal.   Eyes:      Pupils: Pupils are equal, round, and reactive to light. Cardiovascular:      Rate and Rhythm: Normal rate.    Pulmonary:      Effort: Pulmonary effort is normal.   Abdominal:      General: Abdomen is flat. Musculoskeletal:         General: Normal range of motion. Cervical back: Normal range of motion. Skin:     General: Skin is warm and dry. Findings: Rash present. Neurological:      Mental Status: She is alert and oriented to person, place, and time.

## 2023-12-01 DIAGNOSIS — E06.3 HYPOTHYROIDISM DUE TO HASHIMOTO'S THYROIDITIS: ICD-10-CM

## 2023-12-01 DIAGNOSIS — E04.9 GOITER: ICD-10-CM

## 2023-12-01 DIAGNOSIS — E83.52 HYPERCALCEMIA: Primary | ICD-10-CM

## 2023-12-01 DIAGNOSIS — E55.9 VITAMIN D DEFICIENCY: ICD-10-CM

## 2023-12-01 DIAGNOSIS — R79.89 ELEVATED PARATHYROID HORMONE: ICD-10-CM

## 2023-12-01 DIAGNOSIS — E03.8 HYPOTHYROIDISM DUE TO HASHIMOTO'S THYROIDITIS: ICD-10-CM

## 2023-12-19 ENCOUNTER — APPOINTMENT (OUTPATIENT)
Dept: LAB | Facility: HOSPITAL | Age: 58
End: 2023-12-19
Payer: COMMERCIAL

## 2023-12-19 DIAGNOSIS — E06.3 HYPOTHYROIDISM DUE TO HASHIMOTO'S THYROIDITIS: ICD-10-CM

## 2023-12-19 DIAGNOSIS — E83.52 HYPERCALCEMIA: ICD-10-CM

## 2023-12-19 DIAGNOSIS — E55.9 VITAMIN D DEFICIENCY: ICD-10-CM

## 2023-12-19 DIAGNOSIS — E04.9 GOITER: ICD-10-CM

## 2023-12-19 DIAGNOSIS — R79.89 ELEVATED PARATHYROID HORMONE: ICD-10-CM

## 2023-12-19 DIAGNOSIS — E78.5 HYPERLIPIDEMIA, UNSPECIFIED HYPERLIPIDEMIA TYPE: ICD-10-CM

## 2023-12-19 DIAGNOSIS — E03.8 HYPOTHYROIDISM DUE TO HASHIMOTO'S THYROIDITIS: ICD-10-CM

## 2023-12-19 LAB
25(OH)D3 SERPL-MCNC: 33.5 NG/ML (ref 30–100)
ALBUMIN SERPL BCP-MCNC: 4.4 G/DL (ref 3.5–5)
ALP SERPL-CCNC: 67 U/L (ref 34–104)
ALT SERPL W P-5'-P-CCNC: 25 U/L (ref 7–52)
ANION GAP SERPL CALCULATED.3IONS-SCNC: 6 MMOL/L
AST SERPL W P-5'-P-CCNC: 28 U/L (ref 13–39)
BILIRUB SERPL-MCNC: 0.5 MG/DL (ref 0.2–1)
BUN SERPL-MCNC: 16 MG/DL (ref 5–25)
CALCIUM SERPL-MCNC: 9.8 MG/DL (ref 8.4–10.2)
CHLORIDE SERPL-SCNC: 103 MMOL/L (ref 96–108)
CHOLEST SERPL-MCNC: 207 MG/DL
CO2 SERPL-SCNC: 31 MMOL/L (ref 21–32)
CREAT SERPL-MCNC: 0.94 MG/DL (ref 0.6–1.3)
GFR SERPL CREATININE-BSD FRML MDRD: 67 ML/MIN/1.73SQ M
GLUCOSE P FAST SERPL-MCNC: 98 MG/DL (ref 65–99)
HDLC SERPL-MCNC: 69 MG/DL
LDLC SERPL CALC-MCNC: 113 MG/DL (ref 0–100)
NONHDLC SERPL-MCNC: 138 MG/DL
PHOSPHATE SERPL-MCNC: 2.8 MG/DL (ref 2.7–4.5)
POTASSIUM SERPL-SCNC: 4.1 MMOL/L (ref 3.5–5.3)
PROT SERPL-MCNC: 6.9 G/DL (ref 6.4–8.4)
PTH-INTACT SERPL-MCNC: 113.5 PG/ML (ref 12–88)
SODIUM SERPL-SCNC: 140 MMOL/L (ref 135–147)
T4 FREE SERPL-MCNC: 1.16 NG/DL (ref 0.61–1.12)
TRIGL SERPL-MCNC: 123 MG/DL
TSH SERPL DL<=0.05 MIU/L-ACNC: 1.05 UIU/ML (ref 0.45–4.5)

## 2023-12-19 PROCEDURE — 82306 VITAMIN D 25 HYDROXY: CPT

## 2023-12-19 PROCEDURE — 36415 COLL VENOUS BLD VENIPUNCTURE: CPT

## 2023-12-19 PROCEDURE — 80053 COMPREHEN METABOLIC PANEL: CPT

## 2023-12-19 PROCEDURE — 84100 ASSAY OF PHOSPHORUS: CPT

## 2023-12-19 PROCEDURE — 80061 LIPID PANEL: CPT

## 2023-12-19 PROCEDURE — 84439 ASSAY OF FREE THYROXINE: CPT

## 2023-12-19 PROCEDURE — 83970 ASSAY OF PARATHORMONE: CPT

## 2023-12-19 PROCEDURE — 84443 ASSAY THYROID STIM HORMONE: CPT

## 2024-01-02 ENCOUNTER — OFFICE VISIT (OUTPATIENT)
Dept: FAMILY MEDICINE CLINIC | Facility: HOSPITAL | Age: 59
End: 2024-01-02
Payer: COMMERCIAL

## 2024-01-02 VITALS
OXYGEN SATURATION: 96 % | TEMPERATURE: 99 F | DIASTOLIC BLOOD PRESSURE: 62 MMHG | WEIGHT: 187 LBS | SYSTOLIC BLOOD PRESSURE: 108 MMHG | HEIGHT: 65 IN | BODY MASS INDEX: 31.16 KG/M2 | HEART RATE: 85 BPM

## 2024-01-02 DIAGNOSIS — Q99.8: ICD-10-CM

## 2024-01-02 DIAGNOSIS — H66.91 OTITIS MEDIA OF RIGHT EAR WITH COEXISTING ILLNESS REQUIRING SECOND-LINE MEDICATION: Primary | ICD-10-CM

## 2024-01-02 DIAGNOSIS — J02.9 PHARYNGITIS, UNSPECIFIED ETIOLOGY: ICD-10-CM

## 2024-01-02 DIAGNOSIS — J40 TRACHEOBRONCHITIS: ICD-10-CM

## 2024-01-02 DIAGNOSIS — C82.99 FOLLICULAR LYMPHOMA OF EXTRANODAL SITE EXCLUDING SPLEEN AND OTHER SOLID ORGANS, UNSPECIFIED FOLLICULAR LYMPHOMA TYPE (HCC): ICD-10-CM

## 2024-01-02 LAB
SL AMB POCT RAPID FLU A: NORMAL
SL AMB POCT RAPID FLU B: NORMAL

## 2024-01-02 PROCEDURE — 99213 OFFICE O/P EST LOW 20 MIN: CPT | Performed by: INTERNAL MEDICINE

## 2024-01-02 PROCEDURE — 87804 INFLUENZA ASSAY W/OPTIC: CPT | Performed by: INTERNAL MEDICINE

## 2024-01-02 RX ORDER — DORZOLAMIDE HCL 20 MG/ML
1 SOLUTION/ DROPS OPHTHALMIC 2 TIMES DAILY
COMMUNITY
Start: 2023-12-01

## 2024-01-02 RX ORDER — BENZONATATE 200 MG/1
200 CAPSULE ORAL 3 TIMES DAILY PRN
Qty: 20 CAPSULE | Refills: 0 | Status: SHIPPED | OUTPATIENT
Start: 2024-01-02

## 2024-01-02 RX ORDER — AMOXICILLIN AND CLAVULANATE POTASSIUM 875; 125 MG/1; MG/1
1 TABLET, FILM COATED ORAL 2 TIMES DAILY
Qty: 14 TABLET | Refills: 0 | Status: SHIPPED | OUTPATIENT
Start: 2024-01-02 | End: 2024-01-09

## 2024-01-02 NOTE — PROGRESS NOTES
Assessment/Plan:     Diagnosis ICD-10-CM Associated Orders   1. Otitis media of right ear with coexisting illness requiring second-line medication  H66.91 amoxicillin-clavulanate (AUGMENTIN) 875-125 mg per tablet      2. Pharyngitis, unspecified etiology  J02.9       3. Tracheobronchitis  J40 benzonatate (TESSALON) 200 MG capsule      4. Follicular lymphoma of extranodal site excluding spleen and other solid organs, unspecified follicular lymphoma type (HCC)  C82.99       5. Enhanced S-cone syndrome  Q99.8           Problem List Items Addressed This Visit          Other    Enhanced S-cone syndrome     At [place]- Dr. Sumner- seen in November- having convergence- eye strain         Relevant Medications    dorzolamide (TRUSOPT) 2 % ophthalmic solution    Follicular lymphoma of extranodal site excluding spleen and other solid organs (HCC)     Follows with [facility] team          Other Visit Diagnoses       Otitis media of right ear with coexisting illness requiring second-line medication    -  Primary    Relevant Medications    amoxicillin-clavulanate (AUGMENTIN) 875-125 mg per tablet    Pharyngitis, unspecified etiology        Tracheobronchitis        Relevant Medications    benzonatate (TESSALON) 200 MG capsule              Return keep april appt, for Recheck.      Subjective:    Patient ID: Brandy Ayala is a 58 y.o. female    Started in mid December 20 had congestion and low grade fever- tested negative on 23, 24    Then was getting better and now daughter is ill  now. Has thick sputum in p am- had temp 99.3- chills and feels tired. Feels exhausted  Had flu shot then 3 weeks later Had covid vaccine- had shingles 2 weeks later( took valcyclovir from urgent care   Now has cough   Sees Dr. Hammond at [facility] for her lymphoma-- last treatment sept 2022        The following portions of the patient's history were reviewed and updated as appropriate: allergies, current medications and problem list.     Review of Systems    Constitutional:  Positive for chills and fever.        99.3   HENT:  Positive for congestion.    Respiratory:  Positive for cough and shortness of breath.    Musculoskeletal:  Positive for myalgias.   All other systems reviewed and are negative.        Objective:      Current Outpatient Medications:     amoxicillin-clavulanate (AUGMENTIN) 875-125 mg per tablet, Take 1 tablet by mouth 2 (two) times a day for 7 days, Disp: 14 tablet, Rfl: 0    benzonatate (TESSALON) 200 MG capsule, Take 1 capsule (200 mg total) by mouth 3 (three) times a day as needed for cough, Disp: 20 capsule, Rfl: 0    calcium carbonate (Tums Ultra 1000) 1000 MG chewable tablet, Takes 1 as needed, Disp: , Rfl:     dorzolamide (TRUSOPT) 2 % ophthalmic solution, Apply 1 drop to eye 2 (two) times a day, Disp: , Rfl:     estradiol (ESTRACE) 0.5 MG tablet, Take 0.5 mg by mouth daily , Disp: , Rfl:     ibuprofen (MOTRIN) 200 mg tablet, Take 600 mg by mouth every 6 (six) hours as needed for mild pain, Disp: , Rfl:     levothyroxine 125 mcg tablet, Take 1 tablet 6 days a week and 1/2 tablet on sunday (Patient taking differently: Take 125 mcg by mouth daily Take 1 tablet 6 days a week), Disp: 90 tablet, Rfl: 3    Multiple Vitamins-Minerals (MULTIVITAMIN GUMMIES WOMENS) CHEW, Chew 2 each daily, Disp: , Rfl:     omeprazole (PriLOSEC) 40 MG capsule, Take 1 capsule (40 mg total) by mouth daily, Disp: 90 capsule, Rfl: 12    verapamil (CALAN-SR) 120 mg CR tablet, Take 120 mg by mouth, Disp: , Rfl:     VITAMIN D, ERGOCALCIFEROL, PO, Take 5,000 Units by mouth daily, Disp: , Rfl:     DULoxetine (CYMBALTA) 20 mg capsule, TAKE 1 CAPSULE BY MOUTH EVERY DAY (Patient not taking: Reported on 10/17/2023), Disp: 90 capsule, Rfl: 0    famotidine (PEPCID) 20 mg tablet, 1 as needed for break through (Patient not taking: Reported on 10/17/2023), Disp: , Rfl:     Lutein 20 MG CAPS, Take by mouth daily (Patient not taking: Reported on 1/2/2024), Disp: , Rfl:     NON FORMULARY,  "Cspsules--take 1 at bedtime (Patient not taking: Reported on 11/28/2023), Disp: , Rfl:     valACYclovir (VALTREX) 1,000 mg tablet, Take 1 tablet (1,000 mg total) by mouth 2 (two) times a day for 7 days, Disp: 14 tablet, Rfl: 3    ZOLMitriptan (ZOMIG-ZMT) 2.5 MG disintegrating tablet, Take 1 tablet (2.5 mg total) by mouth daily as needed for migraine, Disp: 18 tablet, Rfl: 3    Blood pressure 108/62, pulse 85, temperature 99 °F (37.2 °C), height 5' 5\" (1.651 m), weight 84.8 kg (187 lb), SpO2 96%.     Physical Exam  Vitals and nursing note reviewed.   HENT:      Head: Normocephalic.      Left Ear: Tympanic membrane normal.      Ears:      Comments: Ight otitis media     Nose: Congestion present.   Eyes:      General:         Right eye: No discharge.         Left eye: No discharge.   Cardiovascular:      Rate and Rhythm: Normal rate and regular rhythm.      Heart sounds: No murmur heard.  Pulmonary:      Breath sounds: No rhonchi.   Abdominal:      Palpations: Abdomen is soft.      Tenderness: There is no abdominal tenderness.   Musculoskeletal:         General: No swelling or tenderness.      Cervical back: No tenderness.   Lymphadenopathy:      Cervical: No cervical adenopathy.   Neurological:      Mental Status: She is alert.   Psychiatric:         Mood and Affect: Mood normal.         Thought Content: Thought content normal.        "

## 2024-01-03 ENCOUNTER — TELEPHONE (OUTPATIENT)
Dept: FAMILY MEDICINE CLINIC | Facility: HOSPITAL | Age: 59
End: 2024-01-03

## 2024-01-03 DIAGNOSIS — J40 TRACHEOBRONCHITIS: Primary | ICD-10-CM

## 2024-01-03 RX ORDER — DEXTROMETHORPHAN HYDROBROMIDE AND PROMETHAZINE HYDROCHLORIDE 15; 6.25 MG/5ML; MG/5ML
5 SYRUP ORAL 4 TIMES DAILY PRN
Qty: 118 ML | Refills: 0 | Status: SHIPPED | OUTPATIENT
Start: 2024-01-03

## 2024-01-03 NOTE — TELEPHONE ENCOUNTER
Patient called again to check if something else could be called in for her.     She left a message @ 12:44 pm

## 2024-01-08 ENCOUNTER — TELEPHONE (OUTPATIENT)
Dept: FAMILY MEDICINE CLINIC | Facility: HOSPITAL | Age: 59
End: 2024-01-08

## 2024-01-08 NOTE — TELEPHONE ENCOUNTER
Needs a note to get a refund for there trip that they had to cancel.  Been sick since December 20th.

## 2024-01-09 ENCOUNTER — TELEPHONE (OUTPATIENT)
Dept: NEUROLOGY | Facility: CLINIC | Age: 59
End: 2024-01-09

## 2024-01-09 DIAGNOSIS — J40 TRACHEOBRONCHITIS: Primary | ICD-10-CM

## 2024-01-09 DIAGNOSIS — H69.90 DYSFUNCTION OF EUSTACHIAN TUBE, UNSPECIFIED LATERALITY: ICD-10-CM

## 2024-01-09 RX ORDER — PREDNISONE 10 MG/1
TABLET ORAL
Qty: 18 TABLET | Refills: 0 | Status: SHIPPED | OUTPATIENT
Start: 2024-01-09

## 2024-01-09 NOTE — TELEPHONE ENCOUNTER
Samia, this is rBandy DUMONT. Date of birth is [birth year]. I spoke to someone yesterday about a doctor's note so that I can get reimbursed for the vacation that I had to cancel due to illness. No one got back to me yesterday. There's someone could give me a call [phone] with the status of the letter. Thank you.  You received a voice mail from JULIA VILLAFUERTE.

## 2024-01-09 NOTE — TELEPHONE ENCOUNTER
Spoke to PT - her symptoms started on Dec. 20 - saw you on 1/2/24 - still has ear pain and congested.     Her trip is next week - she would be leaving (flying) on Monday 1/15 - ending 1/19.         Please let her know when the letter is ready.

## 2024-01-09 NOTE — TELEPHONE ENCOUNTER
pt  has a scheduled colonscopy same day needs to r/s appt on 2/6/24 at 10am. Offered next open slot on a Tuesday pre request. 02/13/24 at 10am w/ Dr Gutierrez. Location address was provided as well.

## 2024-01-12 DIAGNOSIS — E06.3 HYPOTHYROIDISM DUE TO HASHIMOTO'S THYROIDITIS: ICD-10-CM

## 2024-01-12 DIAGNOSIS — E03.8 HYPOTHYROIDISM DUE TO HASHIMOTO'S THYROIDITIS: ICD-10-CM

## 2024-01-12 RX ORDER — LEVOTHYROXINE SODIUM 0.12 MG/1
TABLET ORAL
Qty: 90 TABLET | Refills: 3 | Status: SHIPPED | OUTPATIENT
Start: 2024-01-12

## 2024-01-18 ENCOUNTER — TELEPHONE (OUTPATIENT)
Dept: NEUROLOGY | Facility: CLINIC | Age: 59
End: 2024-01-18

## 2024-01-20 NOTE — RESULT ENCOUNTER NOTE
Mammogram birads 1- repeat in 1 year
Resident

## 2024-01-30 ENCOUNTER — OFFICE VISIT (OUTPATIENT)
Dept: ENDOCRINOLOGY | Facility: HOSPITAL | Age: 59
End: 2024-01-30
Payer: COMMERCIAL

## 2024-01-30 VITALS
BODY MASS INDEX: 32.82 KG/M2 | SYSTOLIC BLOOD PRESSURE: 104 MMHG | HEART RATE: 70 BPM | HEIGHT: 65 IN | DIASTOLIC BLOOD PRESSURE: 70 MMHG | WEIGHT: 197 LBS

## 2024-01-30 DIAGNOSIS — E55.9 VITAMIN D DEFICIENCY: ICD-10-CM

## 2024-01-30 DIAGNOSIS — E03.8 HYPOTHYROIDISM DUE TO HASHIMOTO'S THYROIDITIS: Primary | ICD-10-CM

## 2024-01-30 DIAGNOSIS — R79.89 ELEVATED PARATHYROID HORMONE: ICD-10-CM

## 2024-01-30 DIAGNOSIS — E06.3 HYPOTHYROIDISM DUE TO HASHIMOTO'S THYROIDITIS: Primary | ICD-10-CM

## 2024-01-30 PROCEDURE — 99214 OFFICE O/P EST MOD 30 MIN: CPT | Performed by: INTERNAL MEDICINE

## 2024-01-30 NOTE — PATIENT INSTRUCTIONS
The thyroid blood work is normal.     Continue the same levothyroxine .    The parathyroid is higher possible due to the vitamin D being lower. It is possible you could be developing primary hyperparathyroidism.     Drink 64 ounces water a day.     Take vitamin d 5000 IU daily.     Keep active, exercise regular.    We'll recheck blood work in 3-4 months.    Follow up in 1 year with blood work.

## 2024-01-30 NOTE — PROGRESS NOTES
1/31/2024    Assessment/Plan     1. Hypothyroidism due to Hashimoto's thyroiditis  -     Comprehensive metabolic panel; Future; Expected date: 01/06/2025  -     T4, free; Future; Expected date: 01/06/2025  -     TSH, 3rd generation; Future; Expected date: 01/06/2025  -     Vitamin D 25 hydroxy; Future; Expected date: 01/06/2025  -     Phosphorus; Future; Expected date: 01/06/2025  -     PTH, intact; Future; Expected date: 01/06/2025  -     Comprehensive metabolic panel  -     T4, free  -     TSH, 3rd generation  -     Vitamin D 25 hydroxy  -     Phosphorus  -     PTH, intact    2. Elevated parathyroid hormone  -     Comprehensive metabolic panel; Future; Expected date: 04/30/2024  -     Phosphorus; Future; Expected date: 04/30/2024  -     PTH, intact; Future; Expected date: 04/30/2024  -     Vitamin D 25 hydroxy; Future; Expected date: 04/30/2024  -     Comprehensive metabolic panel  -     Phosphorus  -     PTH, intact  -     Vitamin D 25 hydroxy  -     Comprehensive metabolic panel; Future; Expected date: 01/06/2025  -     T4, free; Future; Expected date: 01/06/2025  -     TSH, 3rd generation; Future; Expected date: 01/06/2025  -     Vitamin D 25 hydroxy; Future; Expected date: 01/06/2025  -     Phosphorus; Future; Expected date: 01/06/2025  -     PTH, intact; Future; Expected date: 01/06/2025  -     Comprehensive metabolic panel  -     T4, free  -     TSH, 3rd generation  -     Vitamin D 25 hydroxy  -     Phosphorus  -     PTH, intact    3. Vitamin D deficiency  -     Comprehensive metabolic panel; Future; Expected date: 04/30/2024  -     Phosphorus; Future; Expected date: 04/30/2024  -     PTH, intact; Future; Expected date: 04/30/2024  -     Vitamin D 25 hydroxy; Future; Expected date: 04/30/2024  -     Comprehensive metabolic panel  -     Phosphorus  -     PTH, intact  -     Vitamin D 25 hydroxy  -     Comprehensive metabolic panel; Future; Expected date: 01/06/2025  -     T4, free; Future; Expected date:  01/06/2025  -     TSH, 3rd generation; Future; Expected date: 01/06/2025  -     Vitamin D 25 hydroxy; Future; Expected date: 01/06/2025  -     Phosphorus; Future; Expected date: 01/06/2025  -     PTH, intact; Future; Expected date: 01/06/2025  -     Comprehensive metabolic panel  -     T4, free  -     TSH, 3rd generation  -     Vitamin D 25 hydroxy  -     Phosphorus  -     PTH, intact         1. Hypothyroidism due to Hashimoto's thyroiditis.  Most recent thyroid function studies are normal. She is biochemically and clinically euthyroid. She will continue the same levothyroxine 125 mcg 1 tablet 6 days a week.    2. Vitamin D deficiency.  Her vitamin D level is low normal. She was not taking her vitamin D effectively and has restarted vitamin D 5000 units daily.    3. Elevated parathyroid hormone level.  Her parathyroid hormone level has gone up, but her calcium is normal, and her phosphorus is normal. It is possible that the lack of taking as much vitamin D as she was taking before caused the vitamin D to drop to a low normal level, causing the parathyroid hormone level to rise slightly. At this point, we are going to repeat the parathyroid hormone level in several months once she is taking her vitamin D consistently at 5000 units daily. Another possibility since her renal function is normal is that the radiation therapy on the left side of her neck could be initiating primary hyperparathyroidism, and this could be in the early stages. This will be followed over time. I have asked her to increase her water intake to 64 ounces of fluid a day.    I will repeat blood work in 3 to 4 months consisting of CMP, phosphorus, parathyroid hormone level, and 25-hydroxyvitamin D level.    I have asked her to follow up in 1 year with preceding CMP, phosphorus, 25-hydroxyvitamin D level, parathyroid hormone level, TSH, and free T4. It is possible we may have to have her visits adjusted depending on what her bloodwork reveals in the  next several months.    CC: Hypothyroidism, goiter, vitamin D deficiency, parathyroid follow-up    HPI: Diana Ayala is a 58-year-old female with a history of hypothyroidism due to Hashimoto's thyroiditis with goiter, vitamin D deficiency, with an elevated parathyroid hormone level.     She was diagnosed with Hashimoto thyroiditis in 2003 with a TSH over 7. She has been started on thyroid hormone for replacement purposes. Of note, she also has a history of a thyroid goiter and last thyroid ultrasound was in 2017 showing a very mild goiter, but no nodules, so no repeat thyroid ultrasounds have needed to be performed.     She did have a mildly elevated parathyroid hormone level with normal calcium and vitamin D in 11/2022. This has been followed over time, and she does take vitamin D 5000 units daily for a history of vitamin D deficiency. She does, of note, have a history of radiation therapy on the left side of her neck for lymphoma treatment. Her weight is 11 pounds more than last year.     The patient is on a regimen of levothyroxine, 125 mcg, 6 days per week. Her thyroid function has improved, but her parathyroid levels remain elevated. She experiences sleep disturbances due to numbness affecting the left side of her face, including the cheek, chin, ears, and jaw. She has a history of radiation treatment. Daytime fatigue is reported due to poor sleep quality. She also reports episodes of supraventricular tachycardia, occurring once or twice monthly, but denies any associated chest pain, dyspnea, or tremors. She has a history of chronic diarrhea, which was managed with daily Benadryl. Currently, she is experiencing constipation. She denies experiencing abdominal pain, nausea, xerosis, or onychorrhexis. She reports persistent alopecia and dysphagia. She had some swollen glands above her jaw when she was sick and no similar symptoms in the lower neck. 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 She denies any symptoms of anxiety or depression.     Her current medication includes TUMS Ultra for episodic gastroesophageal reflux disease. She takes vitamin D 5000 units a day inconsistently. She forgets to take it for a couple of days. She started taking vitamin D 5000 units more consistently in 12/2023. She has polyuria. She gets up at least twice a night to urinate. She denies polydipsia. She drinks about 2 bottles of water a day. She drinks 1 cup of coffee a day. She is diagnosed with Cytokine Release Syndrome. She acknowledges cognitive sharpness but experiences forgetfulness. She presents with paresthesia in her feet, initially attributed to lumbar region issues. Notably, after walking her dog for 2 blocks, she experiences numbness in her 3 smallest toes. She also suffers from muscle cramps in her calves and fingers and reports persistent hip pain.      Review of Systems  The pertinent positive and negative findings are as noted in the HPI.    Historical Information   Past Medical History:   Diagnosis Date    Cancer (HCC)     non-hogikins lymphoma     Colon polyp     Convergence insufficiency     bilateral eyes.    COVID-19 01/2022    Disease of thyroid gland     Endometrial hyperplasia     Enhanced S-cone syndrome     eyes    Follicular lymphoma (HCC) 04/2020    GERD (gastroesophageal reflux disease)     Irregular heart beat     SVT    Non Hodgkin's lymphoma (HCC) 2020    stage 1, had surgery and XRT to the neck( finished july 2020), rituximab in falll 2022 for recurrence in sinus    Overweight     Retinitis pigmentosa      Past Surgical History:   Procedure Laterality Date    ANAL SPHINCTEROPLASTY      ANAL/SPHINCTEROPLASTY      ANTERIOR AND POSTERIOR VAGINAL REPAIR      BLADDER SUSPENSION      COLONOSCOPY      DILATION AND CURETTAGE OF UTERUS      X 2    HYSTERECTOMY  2014    OOPHORECTOMY Bilateral 2014    with hysterectomy    TONSILLECTOMY AND ADENOIDECTOMY      UPPER GASTROINTESTINAL  ENDOSCOPY      US GUIDED LYMPH NODE BIOPSY LEFT  04/21/2020     Social History   Social History     Substance and Sexual Activity   Alcohol Use Yes    Comment: rare     Social History     Substance and Sexual Activity   Drug Use Yes    Types: Marijuana    Comment: help to sleep, medical marijuana card     Social History     Tobacco Use   Smoking Status Never   Smokeless Tobacco Never     Family History:   Family History   Problem Relation Age of Onset    Cancer Mother         lung    Lung cancer Mother 59    COPD Father     Substance Abuse Sister     HIV Sister     No Known Problems Daughter     No Known Problems Maternal Grandmother     Throat cancer Maternal Grandfather 73    Other Maternal Grandfather         Aortic valve replacement     No Known Problems Paternal Grandmother     No Known Problems Paternal Grandfather     Diabetes type II Brother         on insulin    No Known Problems Paternal Aunt     Mental illness Neg Hx     Colon cancer Neg Hx     Colon polyps Neg Hx        Meds/Allergies   Current Outpatient Medications   Medication Sig Dispense Refill    calcium carbonate (Tums Ultra 1000) 1000 MG chewable tablet Takes 1 as needed      dorzolamide (TRUSOPT) 2 % ophthalmic solution Apply 1 drop to eye 2 (two) times a day      estradiol (ESTRACE) 0.5 MG tablet Take 0.5 mg by mouth daily       ibuprofen (MOTRIN) 200 mg tablet Take 600 mg by mouth every 6 (six) hours as needed for mild pain      levothyroxine 125 mcg tablet TAKE 1 TABLET 6 DAYS A WEEK AND 1/2 TABLET ON SUNDAY (Patient taking differently: Take 1 tablet 6 days a week) 90 tablet 3    Multiple Vitamins-Minerals (MULTIVITAMIN GUMMIES WOMENS) CHEW Chew 2 each daily      NON FORMULARY Cspsules--take 1 at bedtime( MMG)      omeprazole (PriLOSEC) 40 MG capsule Take 1 capsule (40 mg total) by mouth daily 90 capsule 12    valACYclovir (VALTREX) 1,000 mg tablet Take 1 tablet (1,000 mg total) by mouth 2 (two) times a day for 7 days 14 tablet 3    verapamil  "(CALAN-SR) 120 mg CR tablet Take 120 mg by mouth      VITAMIN D, ERGOCALCIFEROL, PO Take 5,000 Units by mouth daily      ZOLMitriptan (ZOMIG-ZMT) 2.5 MG disintegrating tablet Take 1 tablet (2.5 mg total) by mouth daily as needed for migraine 18 tablet 3     No current facility-administered medications for this visit.     Allergies   Allergen Reactions    Latex Rash and Hives    Iodinated Contrast Media Sneezing     Sneezing, tongue tingling  Sneezing, tongue tingling      Gabapentin Edema    Lyrica [Pregabalin] Edema    Other      Other reaction(s): hair dye causes rash  Orange coating    Rituximab Itching and Other (See Comments)     Felt hot all over and restless    Lansoprazole Rash       Objective   Vitals: Blood pressure 104/70, pulse 70, height 5' 5\" (1.651 m), weight 89.4 kg (197 lb).  Invasive Devices       None                   Physical Exam  Physical exam normal with pertinent positives and negatives.  HEENT: No lid lags, stare, proptosis, or periorbital edema. Negative Chvostek sign. The neck exam demonstrates a normal size thyroid. No palpable thyroid nodules. No masses in the neck. No lymphadenopathy.   Respiratory: Lungs are clear.   Cardiovascular: Heart is regular. No murmurs.   Neurological: No tremor of the outstretched hands. Patellar deep tendon reflexes are normal.   Musculoskeletal: No spinous process tenderness.   Abdominal: No CVA tenderness.    The history was obtained from the review of the chart and from the patient.      Lab Results:      Lab Results   Component Value Date    CREATININE 0.94 12/19/2023    CREATININE 0.80 04/04/2023    CREATININE 0.86 09/21/2021    BUN 16 12/19/2023     03/10/2017    K 4.1 12/19/2023     12/19/2023    CO2 31 12/19/2023     eGFR   Date Value Ref Range Status   12/19/2023 67 ml/min/1.73sq m Final         Lab Results   Component Value Date    CHOL 198 03/10/2017    HDL 69 12/19/2023    TRIG 123 12/19/2023       Lab Results   Component Value Date "    ALT 25 12/19/2023    AST 28 12/19/2023    ALKPHOS 67 12/19/2023       Lab Results   Component Value Date    TSH 0.578 09/21/2021    FREET4 1.16 (H) 12/19/2023       Blood work performed on 12/19/2023 showed a CMP with a calcium of 9.8 in the setting of an albumin of 4.4 but was otherwise normal. Phosphorus is 2.8. Intact parathyroid hormone level is 113.5.     25-hydroxyvitamin D level is low normal at 33.5.     TSH is 1.052 with a free T4 of 1.16.     Total cholesterol 207, triglyceride 123, HDL 69, .     CBC performed on 01/12/2023 was normal.    Future Appointments   Date Time Provider Department Center   2/13/2024 10:00 AM Cesar Gutierrez MD [facility]   4/16/2024  2:40 PM DO [facility]   2/4/2025 11:40 AM Patience Bennett MD [facility]       Transcribed for Patience Bennett MD, by Bienvenido Hardin on 01/31/24 at 5:27 PM. Powered by [employer].

## 2024-02-16 DIAGNOSIS — K21.9 GERD WITHOUT ESOPHAGITIS: ICD-10-CM

## 2024-02-16 RX ORDER — OMEPRAZOLE 40 MG/1
40 CAPSULE, DELAYED RELEASE ORAL DAILY
Qty: 90 CAPSULE | Refills: 0 | Status: SHIPPED | OUTPATIENT
Start: 2024-02-16

## 2024-03-11 NOTE — PROGRESS NOTES
FAMILY PRACTICE PRE-OPERATIVE EVALUATION  [facility] PHYSICIAN GROUP [facility] PRIMARY CARE [address]    NAME: Brandy Ayala  AGE: 58 y.o. SEX: female  : 1965   DATE: 3/12/2024    History of Present Illness:     Brandy Ayala is a 58 y.o. female who presents to the office today for a preoperative consultation at the request of surgeon, Dr. Ruelas, who plans on performing left extracapsular cataract removal insertion of lens on 3/25/24. Planned anesthesia is  TBD . Patient has a bleeding risk of: No history of bleeding, use of verapamil. Patient does not have objections to receiving blood products if needed. Current anti-platelet/anti-coagulation medications that the patient is prescribed includes:  N/A .         Assessment of Cardiac Risk:  Denies unstable or severe angina or MI in the last 6 weeks or history of stent placement in the last year   Denies decompensated heart failure (e.g. New onset heart failure, NYHA functional class IV heart failure, or worsening existing heart failure)  Denies significant arrhythmias such as high grade AV block, symptomatic ventricular arrhythmia, newly recognized ventricular tachycardia, supraventricular tachycardia with resting heart rate >100, or symptomatic bradycardia  Denies severe heart valve disease including aortic stenosis or symptomatic mitral stenosis     Exercise Capacity:  Able to walk 4 blocks without symptoms?: Yes  Able to walk 2 flights without symptoms?: Yes    Prior Anesthesia Reactions: No  Personal history of venous thromboembolic disease? No  History of steroid use for >2 weeks within last year? No          Review of Systems:     Review of Systems   Constitutional: Negative.  Negative for activity change, appetite change, chills, fatigue and fever.   HENT: Negative.  Negative for congestion, ear pain, postnasal drip, sinus pain and trouble swallowing.    Eyes:  Positive for visual disturbance.   Respiratory:  Negative for chest  tightness and shortness of breath.    Cardiovascular:  Positive for palpitations. Negative for chest pain and leg swelling.        Denies CP/pressure/dyspnea associated.  [facility]   Gastrointestinal: Negative.  Negative for constipation and diarrhea.   Endocrine: Negative.    Genitourinary: Negative.  Negative for dysuria.   Musculoskeletal:  Positive for arthralgias and neck pain. Negative for neck stiffness.   Skin: Negative.    Allergic/Immunologic: Negative.  Negative for immunocompromised state.   Neurological:  Positive for numbness and headaches. Negative for dizziness and light-headedness.   Hematological: Negative.    Psychiatric/Behavioral:  Positive for sleep disturbance.          S/P neuropathic pain left side of face due to hx radiation.  Is able to sleep x5 hours consistently        Current Problem List:     Patient Active Problem List   Diagnosis    IBS (irritable bowel syndrome)    Iron deficiency    Migraine without aura and without status migrainosus, not intractable    Supraventricular tachycardia    Retinitis pigmentosa    Hypothyroidism due to Hashimoto's thyroiditis    Enhanced S-cone syndrome    History of colonic polyps    Peripheral visual field defect of both eyes    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)    Gastro-esophageal reflux disease with esophagitis    S/P MISHA (total abdominal hysterectomy)    Vitamin D deficiency    History of radiation to head and neck region    Postmenopausal    Elevated parathyroid hormone    Acute right-sided low back pain with right-sided sciatica    Class 1 obesity with body mass index (BMI) of 31.0 to 31.9 in adult    Follicular lymphoma of extranodal site excluding spleen and other solid organs (HCC)    Radiculopathy, cervical    Nasopharyngeal mass    S/P dissection of cervical lymph nodes    Subcapsular cataract    Neck pain    Shingles rash       Allergies:     Allergies   Allergen Reactions    Latex Rash and Hives     Iodinated Contrast Media Sneezing     Sneezing, tongue tingling  Sneezing, tongue tingling      Gabapentin Edema    Lyrica [Pregabalin] Edema    Other      Other reaction(s): hair dye causes rash  Orange coating    Rituximab Itching and Other (See Comments)     Felt hot all over and restless    Lansoprazole Rash       Current Medications:       Current Outpatient Medications:     calcium carbonate (Tums Ultra 1000) 1000 MG chewable tablet, Takes 1 as needed, Disp: , Rfl:     dorzolamide (TRUSOPT) 2 % ophthalmic solution, Apply 1 drop to eye 2 (two) times a day, Disp: , Rfl:     estradiol (ESTRACE) 0.5 MG tablet, Take 0.5 mg by mouth daily , Disp: , Rfl:     ibuprofen (MOTRIN) 200 mg tablet, Take 600 mg by mouth every 6 (six) hours as needed for mild pain, Disp: , Rfl:     ketorolac (ACULAR) 0.5 % ophthalmic solution, Use one drop 4x daily in the affected eye starting AFTER surgery, Disp: , Rfl:     levothyroxine 125 mcg tablet, TAKE 1 TABLET 6 DAYS A WEEK AND 1/2 TABLET ON SUNDAY (Patient taking differently: Take 1 tablet 6 days a week), Disp: 90 tablet, Rfl: 3    Multiple Vitamins-Minerals (MULTIVITAMIN GUMMIES WOMENS) CHEW, Chew 2 each daily, Disp: , Rfl:     NON FORMULARY, Cspsules--take 1 at bedtime( MMG), Disp: , Rfl:     omeprazole (PriLOSEC) 40 MG capsule, TAKE 1 CAPSULE (40 MG TOTAL) BY MOUTH DAILY., Disp: 90 capsule, Rfl: 0    verapamil (CALAN-SR) 120 mg CR tablet, Take 120 mg by mouth, Disp: , Rfl:     VITAMIN D, ERGOCALCIFEROL, PO, Take 5,000 Units by mouth daily, Disp: , Rfl:     ZOLMitriptan (ZOMIG-ZMT) 2.5 MG disintegrating tablet, Take 1 tablet (2.5 mg total) by mouth daily as needed for migraine, Disp: 18 tablet, Rfl: 3    Past Medical History:       Past Medical History:   Diagnosis Date    Cancer (HCC)     non-hogikins lymphoma     Colon polyp     Convergence insufficiency     bilateral eyes.    COVID-19 01/2022    Disease of thyroid gland     Endometrial hyperplasia     Enhanced S-cone syndrome      eyes    Follicular lymphoma (HCC) 04/2020    GERD (gastroesophageal reflux disease)     Irregular heart beat     SVT    Non Hodgkin's lymphoma (HCC) 2020    stage 1, had surgery and XRT to the neck( finished july 2020), rituximab in falll 2022 for recurrence in sinus    Overweight     Retinitis pigmentosa         Past Surgical History:   Procedure Laterality Date    ANAL SPHINCTEROPLASTY      ANAL/SPHINCTEROPLASTY      ANTERIOR AND POSTERIOR VAGINAL REPAIR      BLADDER SUSPENSION      COLONOSCOPY      DILATION AND CURETTAGE OF UTERUS      X 2    HYSTERECTOMY  2014    OOPHORECTOMY Bilateral 2014    with hysterectomy    TONSILLECTOMY AND ADENOIDECTOMY      UPPER GASTROINTESTINAL ENDOSCOPY      US GUIDED LYMPH NODE BIOPSY LEFT  04/21/2020        Family History   Problem Relation Age of Onset    Cancer Mother         lung    Lung cancer Mother 59    COPD Father     Substance Abuse Sister     HIV Sister     Drug abuse Sister     No Known Problems Daughter     No Known Problems Maternal Grandmother     Throat cancer Maternal Grandfather 73    Other Maternal Grandfather         Aortic valve replacement     No Known Problems Paternal Grandmother     No Known Problems Paternal Grandfather     Diabetes type II Brother         on insulin    No Known Problems Paternal Aunt     Mental illness Neg Hx     Colon cancer Neg Hx     Colon polyps Neg Hx         Social History     Socioeconomic History    Marital status: /Civil Union     Spouse name: Not on file    Number of children: Not on file    Years of education: Not on file    Highest education level: Not on file   Occupational History    Occupation: RN   Tobacco Use    Smoking status: Never    Smokeless tobacco: Never   Vaping Use    Vaping status: Never Used   Substance and Sexual Activity    Alcohol use: Yes     Comment: At occasions    Drug use: Yes     Types: Marijuana     Comment: Medical license obtained 10/2020    Sexual activity: Yes     Partners: Male      "Birth control/protection: Male Sterilization   Other Topics Concern    Not on file   Social History Narrative    Dental care, regularly    Lives with spouse    Living situation: Supportive and safe    No alcohol use - As per [employer]    Social drinker - As per [employer]      Social Determinants of Health     Financial Resource Strain: Not on file   Food Insecurity: Not on file   Transportation Needs: No Transportation Needs (9/19/2019)    PRAPARE - Transportation     Lack of Transportation (Medical): No     Lack of Transportation (Non-Medical): No   Physical Activity: Not on file   Stress: Not on file   Social Connections: Not on file   Intimate Partner Violence: Not on file   Housing Stability: Not on file        Physical Exam:     /64   Pulse 68   Ht 5' 5\" (1.651 m)   Wt 88.5 kg (195 lb)   LMP  (LMP Unknown)   SpO2 98%   BMI 32.45 kg/m²     Physical Exam  Vitals and nursing note reviewed.   Constitutional:       Appearance: Normal appearance. She is obese.   HENT:      Head: Normocephalic and atraumatic.      Right Ear: Tympanic membrane, ear canal and external ear normal.      Left Ear: Tympanic membrane, ear canal and external ear normal.      Nose: Nose normal.      Mouth/Throat:      Mouth: Mucous membranes are moist.      Pharynx: Oropharynx is clear.   Eyes:      Extraocular Movements: Extraocular movements intact.      Conjunctiva/sclera: Conjunctivae normal.      Pupils: Pupils are equal, round, and reactive to light.   Cardiovascular:      Rate and Rhythm: Normal rate and regular rhythm.      Pulses: Normal pulses.      Heart sounds: Normal heart sounds.   Pulmonary:      Effort: Pulmonary effort is normal.      Breath sounds: Normal breath sounds.   Abdominal:      General: Bowel sounds are normal.      Palpations: Abdomen is soft.   Musculoskeletal:         General: Normal range of motion.      Cervical back: Normal range of motion and neck supple.   Skin:     General: Skin is warm and " dry.   Neurological:      General: No focal deficit present.      Mental Status: She is alert and oriented to person, place, and time.   Psychiatric:         Mood and Affect: Mood normal.         Behavior: Behavior normal.         Thought Content: Thought content normal.         Judgment: Judgment normal.          Data:     Pre-operative work-up    Laboratory Results:    Latest Reference Range & Units 12/19/23 09:14   Sodium 135 - 147 mmol/L 140   Potassium 3.5 - 5.3 mmol/L 4.1   Chloride 96 - 108 mmol/L 103   Carbon Dioxide 21 - 32 mmol/L 31   ANION GAP mmol/L 6   BUN 5 - 25 mg/dL 16   Creatinine 0.60 - 1.30 mg/dL 0.94   GLUCOSE, FASTING 65 - 99 mg/dL 98   Calcium 8.4 - 10.2 mg/dL 9.8   AST 13 - 39 U/L 28   ALT 7 - 52 U/L 25   ALK PHOS 34 - 104 U/L 67   Total Protein 6.4 - 8.4 g/dL 6.9   Albumin 3.5 - 5.0 g/dL 4.4   Total Bilirubin 0.20 - 1.00 mg/dL 0.50   GFR, Calculated ml/min/1.73sq m 67   Phosphorus 2.7 - 4.5 mg/dL 2.8   Cholesterol See Comment mg/dL 207 (H)   Triglycerides See Comment mg/dL 123   HDL >=50 mg/dL 69   Non-HDL Cholesterol mg/dl 138   LDL Calculated 0 - 100 mg/dL 113 (H)   VITAMIN D, 25-HYDROXY 30.0 - 100.0 ng/mL 33.5   PTH 12.0 - 88.0 pg/mL 113.5 (H)   TSH 3RD GENERATON 0.450 - 4.500 uIU/mL 1.052   FREE T4 0.61 - 1.12 ng/dL 1.16 (H)   (H): Data is abnormally high       Previous cardiopulmonary studies within the past year:  Echocardiogram: 3/23/21 reviewed.  EF 55-60% with mild AV sclerosis.  Followed closely by cardiology.        Assessment & Recommendations:     1. Pre-op examination    2. Combined form of age-related cataract, left eye    3. Supraventricular tachycardia  Assessment & Plan:  [facility], last OV 9/2023  -paroxysmal SVT, reports occasional persistent palpitations without CP/pressure/dyspnea/paroxysmal nocturnal dyspnea.   - Stress Echo 3/23/21:  Negative for ischemia.  LV size/wall thickness normal.  Systolic fxn WNL with EF 55-60%.  No  regurgitation/stenosis.  Mild AV sclerosis.   -continue verapamil 120mg daily       4. 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)  Assessment & Plan:  Hx recurrent follicular lymphoma.  Followed by Dr. Madrigal [facility], last OV 12/2023.  Currently under surveillance.           5. Elevated parathyroid hormone  Assessment & Plan:  Elevated PTH with Ca/Phos normal.  Followed by Endocrine who is tx Vitamin D deficiency with 5000IU daily as well as optimize hydration and repeat PTH due in April/May 2024.          Pre-Op Evaluation Assessment  58 y.o. female with planned surgery: Extracapsular cataract removal with insertion lens prothesis left eye..    Known risk factors for perioperative complications: None.    Current medications which may produce withdrawal symptoms if withheld perioperatively: N/A.    Pre-Op Evaluation Plan  1. Further preoperative workup as follows:   - None; no further preoperative work-up is required    2. Medication Management/Recommendations:   - None, continue medication regimen including morning of surgery, with sip of water    3. Prophylaxis for cardiac events with perioperative beta-blockers: not indicated.    4. Patient requires further consultation with: None    Clearance  Patient is CLEARED for surgery without any additional cardiac testing.     MANDY Cueva  [facility] PRIMARY CARE [address]  [facility] [address] [address]  Phone#  [phone]  Fax#  [phone]

## 2024-03-12 ENCOUNTER — OFFICE VISIT (OUTPATIENT)
Dept: FAMILY MEDICINE CLINIC | Facility: HOSPITAL | Age: 59
End: 2024-03-12
Payer: COMMERCIAL

## 2024-03-12 VITALS
HEIGHT: 65 IN | OXYGEN SATURATION: 98 % | WEIGHT: 195 LBS | HEART RATE: 68 BPM | SYSTOLIC BLOOD PRESSURE: 108 MMHG | DIASTOLIC BLOOD PRESSURE: 64 MMHG | BODY MASS INDEX: 32.49 KG/M2

## 2024-03-12 DIAGNOSIS — H25.812 COMBINED FORM OF AGE-RELATED CATARACT, LEFT EYE: ICD-10-CM

## 2024-03-12 DIAGNOSIS — Z01.818 PRE-OP EXAMINATION: Primary | ICD-10-CM

## 2024-03-12 DIAGNOSIS — R79.89 ELEVATED PARATHYROID HORMONE: ICD-10-CM

## 2024-03-12 DIAGNOSIS — C82.31 GRADE 3A FOLLICULAR LYMPHOMA OF LYMPH NODES OF NECK (HCC): ICD-10-CM

## 2024-03-12 DIAGNOSIS — I47.10 SUPRAVENTRICULAR TACHYCARDIA: ICD-10-CM

## 2024-03-12 PROCEDURE — 99214 OFFICE O/P EST MOD 30 MIN: CPT | Performed by: NURSE PRACTITIONER

## 2024-03-12 RX ORDER — KETOROLAC TROMETHAMINE 5 MG/ML
SOLUTION OPHTHALMIC
COMMUNITY
Start: 2024-02-28

## 2024-03-12 NOTE — ASSESSMENT & PLAN NOTE
Hx recurrent follicular lymphoma.  Followed by Dr. Madrigal [facility], last OV 12/2023.  Currently under surveillance.

## 2024-03-12 NOTE — ASSESSMENT & PLAN NOTE
Elevated PTH with Ca/Phos normal.  Followed by Endocrine who is tx Vitamin D deficiency with 5000IU daily as well as optimize hydration and repeat PTH due in April/May 2024.

## 2024-03-12 NOTE — ASSESSMENT & PLAN NOTE
Followed by [facility]iology Dr. Sol, last OV 9/2023  -paroxysmal SVT, reports occasional persistent palpitations without CP/pressure/dyspnea/paroxysmal nocturnal dyspnea.   - Stress Echo 3/23/21:  Negative for ischemia.  LV size/wall thickness normal.  Systolic fxn WNL with EF 55-60%.  No regurgitation/stenosis.  Mild AV sclerosis.   -continue verapamil 120mg daily

## 2024-03-22 ENCOUNTER — TELEPHONE (OUTPATIENT)
Dept: NEUROLOGY | Facility: CLINIC | Age: 59
End: 2024-03-22

## 2024-03-22 NOTE — TELEPHONE ENCOUNTER
Spoke to pt to move to her 1:45 with Klarissa (ok per staff message to Malu) due to Scheduling with Dr. Gutierrez. Pt confirmed time and date of appt. 3/26 @ 1:45 in CV.

## 2024-03-26 ENCOUNTER — OFFICE VISIT (OUTPATIENT)
Dept: NEUROLOGY | Facility: CLINIC | Age: 59
End: 2024-03-26

## 2024-03-26 ENCOUNTER — OFFICE VISIT (OUTPATIENT)
Dept: GASTROENTEROLOGY | Facility: CLINIC | Age: 59
End: 2024-03-26
Payer: COMMERCIAL

## 2024-03-26 VITALS
HEIGHT: 65 IN | BODY MASS INDEX: 32.15 KG/M2 | DIASTOLIC BLOOD PRESSURE: 62 MMHG | SYSTOLIC BLOOD PRESSURE: 106 MMHG | WEIGHT: 193 LBS

## 2024-03-26 VITALS
HEART RATE: 73 BPM | HEIGHT: 65 IN | DIASTOLIC BLOOD PRESSURE: 60 MMHG | BODY MASS INDEX: 32.3 KG/M2 | WEIGHT: 193.9 LBS | TEMPERATURE: 98.2 F | SYSTOLIC BLOOD PRESSURE: 110 MMHG

## 2024-03-26 DIAGNOSIS — K21.00 GASTROESOPHAGEAL REFLUX DISEASE WITH ESOPHAGITIS, UNSPECIFIED WHETHER HEMORRHAGE: Primary | ICD-10-CM

## 2024-03-26 DIAGNOSIS — C82.39 GRADE 3A FOLLICULAR LYMPHOMA OF EXTRANODAL SITE EXCLUDING SPLEEN AND OTHER SOLID ORGANS (HCC): ICD-10-CM

## 2024-03-26 DIAGNOSIS — K58.2 IRRITABLE BOWEL SYNDROME WITH BOTH CONSTIPATION AND DIARRHEA: ICD-10-CM

## 2024-03-26 DIAGNOSIS — G43.009 MIGRAINE WITHOUT AURA AND WITHOUT STATUS MIGRAINOSUS, NOT INTRACTABLE: ICD-10-CM

## 2024-03-26 DIAGNOSIS — Z86.010 HISTORY OF COLONIC POLYPS: ICD-10-CM

## 2024-03-26 PROCEDURE — 99213 OFFICE O/P EST LOW 20 MIN: CPT | Performed by: INTERNAL MEDICINE

## 2024-03-26 RX ORDER — METOCLOPRAMIDE 10 MG/1
10 TABLET ORAL EVERY 6 HOURS PRN
Qty: 30 TABLET | Refills: 0 | Status: SHIPPED | OUTPATIENT
Start: 2024-03-26

## 2024-03-26 RX ORDER — TOPIRAMATE 25 MG/1
TABLET ORAL
Qty: 120 TABLET | Refills: 0 | Status: SHIPPED | OUTPATIENT
Start: 2024-03-26

## 2024-03-26 RX ORDER — PREDNISOLONE ACETATE 10 MG/ML
SUSPENSION/ DROPS OPHTHALMIC
COMMUNITY
Start: 2024-02-28

## 2024-03-26 NOTE — PROGRESS NOTES
[facility] - Outpatient Follow-up Note  Brandy Ayala 58 y.o. female MRN: [id number]  Encounter: [id number]    ASSESSMENT AND PLAN:      1. Gastroesophageal reflux disease with esophagitis, unspecified whether hemorrhage  Doing well on omeprazole 40 mg daily  -Continue current regiment    2. Irritable bowel syndrome with both constipation and diarrhea  Alternating diarrhea and constipation.  Trying to eat a high-fiber diet  -Fiber supplement with extra water  -Physical therapy consult for pelvic floor physical therapy  - Ambulatory Referral to Physical Therapy; Future    3. Grade 3a follicular lymphoma of extranodal site excluding spleen and other solid organs (HCC)  Treated with Rituxan in the past.  Now with recurrence in the nasal sinuses.  Getting reevaluated for retreatment    4. History of colonic polyps  Last colonoscopy June 2021.  Due for follow-up 2026      Follow up appointment: 1 year    _______________________      Chief Complaint   Patient presents with    Follow-up     Annual visit, patient still having diarrhea or constipation,       HPI:   Patient is a 58 y.o. female with a significant PMH of GERD presenting for follow up.  From a GI standpoint she is doing well.  She continues take omeprazole once a day.  As long as she takes this she has no significant breakthrough heartburn or indigestion.  She denies any dysphagia, dyne aphasia, early satiety.  Her bowels continue to move somewhat erratically.  She is having issues with fecal incontinence when she gets significant urgency.  She denies any rectal bleeding.  Her weight is stable.  Unfortunately she was recently diagnosed with recurrence of her lymphoma and is getting evaluated for retreatment    Historical Information   Past Medical History:   Diagnosis Date    Cancer (HCC)     non-hogikins lymphoma     Colon polyp     Convergence insufficiency     bilateral eyes.    COVID-19 01/2022    Disease of thyroid  gland     Endometrial hyperplasia     Enhanced S-cone syndrome     eyes    Follicular lymphoma (HCC) 04/2020    GERD (gastroesophageal reflux disease)     Headache, tension-type Unsure    Irregular heart beat     SVT    Memory loss 2018    Approx    Migraine 1983    Ised to only be related to mengrual cycle when i was young. Now multiple times a week cor a few years    Movement disorder 2023    Mild leg tremors while in bed    Non Hodgkin's lymphoma (HCC) 2020    stage 1, had surgery and XRT to the neck( finished july 2020), rituximab in falll 2022 for recurrence in sinus    Overweight     Peripheral neuropathy 2015    Arms and feet    Retinitis pigmentosa     Shingles 12/23    Vision loss 1969    Head trauma from a fall     Past Surgical History:   Procedure Laterality Date    ANAL SPHINCTEROPLASTY      ANAL/SPHINCTEROPLASTY      ANTERIOR AND POSTERIOR VAGINAL REPAIR      BLADDER SUSPENSION      CATARACT EXTRACTION      COLONOSCOPY      DILATION AND CURETTAGE OF UTERUS      X 2    HYSTERECTOMY  2014    OOPHORECTOMY Bilateral 2014    with hysterectomy    TONSILLECTOMY AND ADENOIDECTOMY      UPPER GASTROINTESTINAL ENDOSCOPY      US GUIDED LYMPH NODE BIOPSY LEFT  04/21/2020     Social History     Substance and Sexual Activity   Alcohol Use Yes    Comment: At occasions     Social History     Substance and Sexual Activity   Drug Use Yes    Frequency: 7.0 times per week    Types: Marijuana    Comment: Medical license obtained 10/2020     Social History     Tobacco Use   Smoking Status Never   Smokeless Tobacco Never     Family History   Problem Relation Age of Onset    Cancer Mother         lung    Lung cancer Mother 59    COPD Father     Substance Abuse Sister     HIV Sister     Drug abuse Sister     No Known Problems Daughter     No Known Problems Maternal Grandmother     Throat cancer Maternal Grandfather 73    Other Maternal Grandfather         Aortic valve replacement     No Known Problems Paternal Grandmother      "No Known Problems Paternal Grandfather     Diabetes type II Brother         on insulin    No Known Problems Paternal Aunt     Mental illness Neg Hx     Colon cancer Neg Hx     Colon polyps Neg Hx          Current Outpatient Medications:     calcium carbonate (Tums Ultra 1000) 1000 MG chewable tablet    dorzolamide (TRUSOPT) 2 % ophthalmic solution    estradiol (ESTRACE) 0.5 MG tablet    ibuprofen (MOTRIN) 200 mg tablet    ketorolac (ACULAR) 0.5 % ophthalmic solution    levothyroxine 125 mcg tablet    metoclopramide (REGLAN) 10 mg tablet    Multiple Vitamins-Minerals (MULTIVITAMIN GUMMIES WOMENS) CHEW    NON FORMULARY    omeprazole (PriLOSEC) 40 MG capsule    prednisoLONE acetate (PRED FORTE) 1 % ophthalmic suspension    topiramate (Topamax) 25 mg tablet    verapamil (CALAN-SR) 120 mg CR tablet    VITAMIN D, ERGOCALCIFEROL, PO    ZOLMitriptan (ZOMIG-ZMT) 2.5 MG disintegrating tablet  Allergies   Allergen Reactions    Latex Rash and Hives    Iodinated Contrast Media Sneezing     Sneezing, tongue tingling  Sneezing, tongue tingling      Gabapentin Edema    Lyrica [Pregabalin] Edema    Other      Other reaction(s): hair dye causes rash  Orange coating    Rituximab Itching and Other (See Comments)     Felt hot all over and restless    Lansoprazole Rash     Reviewed medications and allergies and updated as indicated    PHYSICAL EXAM:    Blood pressure 106/62, height 5' 5\" (1.651 m), weight 87.5 kg (193 lb). Body mass index is 32.12 kg/m².  General Appearance: NAD, cooperative, alert  Eyes: Anicteric  Chest: Clear to auscultation  Heart: Regular rate and rhythm  GI:  Soft, non-tender, non-distended; normal bowel sounds; no masses, no organomegaly   Rectal: Deferred  Musculoskeletal: No edema.  Skin:  No jaundice    Lab Results:   Lab Results   Component Value Date    WBC 4.7 06/26/2020    HGB 13.6 06/26/2020    MCV 85 06/26/2020     06/26/2020     Lab Results   Component Value Date     03/10/2017    K 4.1 " 12/19/2023     12/19/2023    CO2 31 12/19/2023    BUN 16 12/19/2023    CREATININE 0.94 12/19/2023    GLUCOSE 82 03/10/2017    GLUF 98 12/19/2023    CALCIUM 9.8 12/19/2023    CORRECTEDCA 9.5 04/04/2023    AST 28 12/19/2023    AST 23 04/04/2023    AST 31 (H) 08/02/2022    ALT 25 12/19/2023    ALT 25 04/04/2023    ALT 34 (H) 08/02/2022    ALKPHOS 67 12/19/2023    ALKPHOS 72 04/04/2023    ALKPHOS 77 08/02/2022    EGFR 67 12/19/2023     Radiology Results:   None recently

## 2024-03-26 NOTE — ASSESSMENT & PLAN NOTE
She reports approximately 4 migraine days per week which seem to be linked to the days that she is working on her computer.  She has a longstanding history of migraines which started when she was a teenager and seemed to coincide with her menstrual cycle.  After her hysterectomy in 2010, her headaches diminished in frequency but never full went away.  She had an MRI of her neck for ongoing neck pain and had an appointment with the neurosurgeon but they did not offer surgery and recommended conservative management at that time. Face pressure and headache improved slightly after she had her lymphoma treated (nasopharynx 2022) but the pain never went fully away  Workup:  With no new or concerning symptoms, no red flags and an unremarkable neurologic exam, there is no specific indication for further evaluation with MRI brain.  However, this could be obtained at any time if indicated  Preventative:  We discussed headache hygiene and lifestyle factors that may improve headaches  Since patient is experiencing >5 headache days per month, I am recommending that she starts a preventative medication at this time  Start Topamax 25mg nightly x1 week, then increase to 50mg nightly x1 week, then increase to 75mg nightly x1 week, then increase to 100mg nightly. May stop increasing if lower dose seems to be effective at preventing headaches  Past/ failed/contraindicated: Cymbalta (prescribed for pain but didn't help with headaches)  Future options: Propranolol, amitriptyline, CGRP med, botox  Acute:  Discussed not taking over-the-counter or prescription pain medications more than 3 days per week to prevent medication overuse/rebound headache  Since she tolerates Zomig well but it became less effective for her, I am recommending that she takes Zomig +/- Reglan, +/- 3 ibuprofen or 2 extra strength tylenol. May repeat reglan and or ibuprofen or tylenol in 6 hours if needed  Past/ failed/contraindicated: Imitrex  Future options:   ubrelvy, abdirahman mcclure

## 2024-03-26 NOTE — PATIENT INSTRUCTIONS
Patient Instructions:    Additional Testing  -You are not in need of cerebrovascular imaging at this time.     Headache/migraine treatment:     Prevention  -To take every day to help prevent headaches - not to take at the time of headache:  -Start Topamax 25mg nightly.  May increase by 25mg weekly up to 100mg max.  Stick to the lowest dose that works well for you.  Please let me know if you have any side effects  -Over the counter preventive supplements for headaches/migraines (if you try, try for 3 months straight):  -Magnesium 400mg daily (If any diarrhea or upset stomach, decrease dose as tolerated)  -Riboflavin (Vitamin B2) 400mg daily (may make your urine bright/neon yellow)  - All supplements can be purchased online    Abortive  -For immediate treatment of a headache/migraine  -For your more moderate to severe migraines take this medication as early as possible:  -Zomig +/- Reglan, +/- 3 ibuprofen or 2 extra strength tylenol. May repeat reglan and or tylenol in 6 hours if needed.    -It is ok to take ibuprofen, acetaminophen or naproxen (Advil, Tylenol,  Aleve, Excedrin) if they help your headaches you should limit these to No more than 3 times a week to avoid medication overuse/rebound headaches.     Lifestyle Recommendations:    -Remain well-hydrated drinking at least 48 to 64 ounces of noncaffeinated beverages per day in addition to anything caffeinated.    -Getting adequate rest is also very important for migraine prevention (aim for 7-8 hours per night).     -Regular exercise is also beneficial for headache prevention.  I would encourage at the least 5 days of physical exercise weekly for at least 30 minutes.   -I would like for them to keep track of their migraines using an application on their phone or calendar as they see fit. Phone applications: Migraine Ismael or Migraine Diary.    Education and Follow-up:    -Please call or send a [employer] message with any questions or concerns. Please present to the  emergency room with any concerning symptoms such as: worst headache of your life, sudden painless loss of vision or double vision, difficulty speaking or swallowing, vertigo/room spinning that does not quickly resolve, or weakness/numbness/loss of coordination affecting 1 side of the face or body.  -Follow up in 3 months or sooner if needed.

## 2024-03-26 NOTE — PROGRESS NOTES
[facility]  PATIENT:  Brandy Ayala  MRN:  [id number]  :  1965  DATE OF SERVICE:  3/28/2024  REFERRED BY: Marti Lema DO  PCP: Marti Lema DO    Assessment/Plan:     Migraine without aura and without status migrainosus, not intractable  She reports approximately 4 migraine days per week which seem to be linked to the days that she is working on her computer.  She has a longstanding history of migraines which started when she was a teenager and seemed to coincide with her menstrual cycle.  After her hysterectomy in , her headaches diminished in frequency but never full went away.  She had an MRI of her neck for ongoing neck pain and had an appointment with the neurosurgeon but they did not offer surgery and recommended conservative management at that time. Face pressure and headache improved slightly after she had her lymphoma treated (nasopharynx ) but the pain never went fully away  Workup:  With no new or concerning symptoms, no red flags and an unremarkable neurologic exam, there is no specific indication for further evaluation with MRI brain.  However, this could be obtained at any time if indicated  Preventative:  We discussed headache hygiene and lifestyle factors that may improve headaches  Since patient is experiencing >5 headache days per month, I am recommending that she starts a preventative medication at this time  Start Topamax 25mg nightly x1 week, then increase to 50mg nightly x1 week, then increase to 75mg nightly x1 week, then increase to 100mg nightly. May stop increasing if lower dose seems to be effective at preventing headaches  Past/ failed/contraindicated: Cymbalta (prescribed for pain but didn't help with headaches)  Future options: Propranolol, amitriptyline, CGRP med, botox  Acute:  Discussed not taking over-the-counter or prescription pain medications more than 3 days per week to prevent medication overuse/rebound headache  Since she tolerates  Zomig well but it became less effective for her, I am recommending that she takes Zomig +/- Reglan, +/- 3 ibuprofen or 2 extra strength tylenol. May repeat reglan and or ibuprofen or tylenol in 6 hours if needed  Past/ failed/contraindicated: Imitrex  Future options:  ubrelvy, reyvow, abdirahman       Diagnoses and all orders for this visit:    Migraine without aura and without status migrainosus, not intractable  -     Ambulatory Referral to Neurology  -     metoclopramide (REGLAN) 10 mg tablet; Take 1 tablet (10 mg total) by mouth every 6 (six) hours as needed (migraine/ nausea)  -     topiramate (Topamax) 25 mg tablet; Take 25mg nightly for 1 week, then increase to 50mg nightly for 1 week, then increase to 75mg nightly for 1 week, then continue at 100mg nightly.    Other orders  -     prednisoLONE acetate (PRED FORTE) 1 % ophthalmic suspension; Use one drop 4x daily in the affected eye starting AFTER surgery         Patient should follow up in 3 months, or I would be happy to see her sooner if needed.  Patient should call the office or send a [employer]! message with any questions or concerns.  Patient should present to the nearest emergency department with any concerning symptoms.     CC:     We had the pleasure of evaluating Brandy in neurological consultation today. she is a 58 y.o. year-old female who presents today for evaluation of headaches.     History obtained from patient as well as available medical record review.    History of Present Illness:     Headaches started at what age? As a teenager, she experiences migraines related to her menstrual cycle.  After her hysterectomy in 2010, her headaches lessened by never went away.  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  She had an MRI of her neck for ongoing neck pain and had an appointment with the neurosurgeon but they did not offer surgery and recommended conservative management at that  time. Face pressure and headache improved slightly after she had her lymphoma treated (nasopharynx 2022) but the pain never went fully away.     How often do the headaches occur? 4 days per week-every day she works on the screen.   What time of the day do the headaches start?  Usually begins after she is working on her computer for 5 or 6 hours.   How long do the headaches last? Can last hours to days  Are you ever headache free? Yes  Aura? without aura  Where is your headache located and pain quality? Right side of sinus and behind her right eye and side and back of her head.   What is the intensity of pain? Worst 7/10, Average: 5/10  Associated symptoms:   [] Nausea       [] Vomiting        [] Diarrhea  [x] Stiff or sore neck   [x] Problems with concentration  [x] Photophobia     []Phonophobia      [] Osmophobia  [] Blurred vision   [x] Prefer quiet, dark room  [] Light-headed or dizzy     [x] Tinnitus   [] Hands or feet tingle or feel numb/paresthesias    [] Ptosis      [] Facial droop  [] Lacrimation  [] Nasal congestion/rhinorrhea        Things that make the headache worse? Working on her computer    Headache triggers:  blue lights, screens, alcohol, salty foods    Have you seen someone else for headaches or pain? No  Have you had trigger point injection performed and how often? Yes  Have you had Botox injection performed and how often? No   Have you had epidural injections or transforaminal injections performed? No  Are you current pregnant or planning on getting pregnant? No. Patient had hysterectomy.    Have you ever had any Brain imaging? 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 2) Subcapsular cataract 3) Cystoid macular edema of both eyes     What medications do you take or have you taken for your headaches?:    ABORTIVE:    OTC medications: ibuprofen 800mg  Prescription: Zomig  (somewhat effective)  Past/failed/contraindicated: Imitrex (stopped working)    PREVENTIVE:   OTC medications:None  Prescription: none  Past/failed/contraindicated: cymbalta    Lifestyle history/modifications:    Sleep   Averages: solid 5 hours of sleep then tosses and turns secondary to hip, neck pain  Water: Drinks maybe 4oz during the work day.   Caffeine: 1 cup per day   Mood: Some anxiety and depression related to her medical condition    Pertinent family history:  Family history of headaches:  no known family members with significant headaches  Any family history of aneurysms - No    Pertinent social history:  Work: Works as a nurse  Education: college  Lives with   Illicit Drugs: denies  Alcohol/tobacco: Denies alcohol use    Past Medical History:     Past Medical History:   Diagnosis Date    Cancer (HCC)     non-hogikins lymphoma     Colon polyp     Convergence insufficiency     bilateral eyes.    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 Now multiple times a week cor a few years    Movement disorder 2023    Mild leg tremors while in bed    Non Hodgkin's lymphoma (HCC) 2020    stage 1, had surgery and XRT to the neck( finished july 2020), rituximab in falll 2022 for recurrence in sinus    Overweight     Peripheral neuropathy 2015    Arms and feet    Retinitis pigmentosa     Shingles 12/23    Vision loss 1969    Head trauma from a fall       Past Surgical History:   Procedure Laterality Date    ANAL SPHINCTEROPLASTY      ANAL/SPHINCTEROPLASTY      ANTERIOR AND POSTERIOR VAGINAL REPAIR      BLADDER SUSPENSION      CATARACT EXTRACTION      COLONOSCOPY      DILATION AND CURETTAGE OF  UTERUS      X 2    HYSTERECTOMY  2014    OOPHORECTOMY Bilateral 2014    with hysterectomy    TONSILLECTOMY AND ADENOIDECTOMY      UPPER GASTROINTESTINAL ENDOSCOPY      US GUIDED LYMPH NODE BIOPSY LEFT  04/21/2020       Patient Active Problem List   Diagnosis    IBS (irritable bowel syndrome)    Iron deficiency    Migraine without aura and without status migrainosus, not intractable    Supraventricular tachycardia    Retinitis pigmentosa    Hypothyroidism due to Hashimoto's thyroiditis    Enhanced S-cone syndrome    History of colonic polyps    Peripheral visual field defect of both eyes    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)    Gastro-esophageal reflux disease with esophagitis    S/P MISHA (total abdominal hysterectomy)    Vitamin D deficiency    History of radiation to head and neck region    Postmenopausal    Elevated parathyroid hormone    Acute right-sided low back pain with right-sided sciatica    Class 1 obesity with body mass index (BMI) of 31.0 to 31.9 in adult    Follicular lymphoma of extranodal site excluding spleen and other solid organs (HCC)    Radiculopathy, cervical    Nasopharyngeal mass    S/P dissection of cervical lymph nodes    Subcapsular cataract    Neck pain    Shingles rash       Medications:     Current Outpatient Medications   Medication Sig Dispense Refill    calcium carbonate (Tums Ultra 1000) 1000 MG chewable tablet Takes 1 as needed      dorzolamide (TRUSOPT) 2 % ophthalmic solution Apply 1 drop to eye 2 (two) times a day      estradiol (ESTRACE) 0.5 MG tablet Take 1 mg by mouth daily      ibuprofen (MOTRIN) 200 mg tablet Take 600 mg by mouth every 6 (six) hours as needed for mild pain      ketorolac (ACULAR) 0.5 % ophthalmic solution Use one drop 4x daily in the affected eye starting AFTER surgery      levothyroxine 125 mcg tablet TAKE 1 TABLET 6 DAYS A WEEK AND 1/2 TABLET ON SUNDAY (Patient taking differently: Take 1 tablet 6 days a week) 90 tablet 3     metoclopramide (REGLAN) 10 mg tablet Take 1 tablet (10 mg total) by mouth every 6 (six) hours as needed (migraine/ nausea) 30 tablet 0    Multiple Vitamins-Minerals (MULTIVITAMIN GUMMIES WOMENS) CHEW Chew 2 each daily      NON FORMULARY Cspsules--take 1 at bedtime( MMG)      omeprazole (PriLOSEC) 40 MG capsule TAKE 1 CAPSULE (40 MG TOTAL) BY MOUTH DAILY. 90 capsule 0    prednisoLONE acetate (PRED FORTE) 1 % ophthalmic suspension Use one drop 4x daily in the affected eye starting AFTER surgery      topiramate (Topamax) 25 mg tablet Take 25mg nightly for 1 week, then increase to 50mg nightly for 1 week, then increase to 75mg nightly for 1 week, then continue at 100mg nightly. 120 tablet 0    verapamil (CALAN-SR) 120 mg CR tablet Take 120 mg by mouth      VITAMIN D, ERGOCALCIFEROL, PO Take 5,000 Units by mouth daily      ZOLMitriptan (ZOMIG-ZMT) 2.5 MG disintegrating tablet Take 1 tablet (2.5 mg total) by mouth daily as needed for migraine 18 tablet 3     No current facility-administered medications for this visit.        Allergies:     Allergies   Allergen Reactions    Latex Rash and Hives    Iodinated Contrast Media Sneezing     Sneezing, tongue tingling  Sneezing, tongue tingling      Gabapentin Edema    Lyrica [Pregabalin] Edema    Other      Other reaction(s): hair dye causes rash  Orange coating    Rituximab Itching and Other (See Comments)     Felt hot all over and restless    Lansoprazole Rash       Family History:     Family History   Problem Relation Age of Onset    Cancer Mother         lung    Lung cancer Mother 59    COPD Father     Substance Abuse Sister     HIV Sister     Drug abuse Sister     No Known Problems Daughter     No Known Problems Maternal Grandmother     Throat cancer Maternal Grandfather 73    Other Maternal Grandfather         Aortic valve replacement     No Known Problems Paternal Grandmother     No Known Problems Paternal Grandfather     Diabetes type II Brother         on insulin    No  "Known Problems Paternal Aunt     Mental illness Neg Hx     Colon cancer Neg Hx     Colon polyps Neg Hx        Social History:     Social History     Socioeconomic History    Marital status: /Civil Union     Spouse name: Not on file    Number of children: Not on file    Years of education: Not on file    Highest education level: Not on file   Occupational History    Occupation: RN   Tobacco Use    Smoking status: Never    Smokeless tobacco: Never   Vaping Use    Vaping status: Never Used   Substance and Sexual Activity    Alcohol use: Yes     Comment: At occasions    Drug use: Yes     Frequency: 7.0 times per week     Types: Marijuana     Comment: Medical license obtained 10/2020    Sexual activity: Yes     Partners: Male     Birth control/protection: Male Sterilization, Female Sterilization   Other Topics Concern    Not on file   Social History Narrative    Dental care, regularly    Lives with spouse    Living situation: Supportive and safe    No alcohol use - As per [employer]    Social drinker - As per [employer]      Social Determinants of Health     Financial Resource Strain: Not on file   Food Insecurity: Not on file   Transportation Needs: No Transportation Needs (9/19/2019)    PRAPARE - Transportation     Lack of Transportation (Medical): No     Lack of Transportation (Non-Medical): No   Physical Activity: Not on file   Stress: Not on file   Social Connections: Not on file   Intimate Partner Violence: Not on file   Housing Stability: Not on file       Objective:     Physical Exam:    Vitals:  /60 (BP Location: Right arm, Patient Position: Sitting, Cuff Size: Adult)   Pulse 73   Temp 98.2 °F (36.8 °C) (Temporal)   Ht 5' 5\" (1.651 m)   Wt 88 kg (193 lb 14.4 oz)   LMP  (LMP Unknown)   BMI 32.27 kg/m²   BP Readings from Last 3 Encounters:   03/26/24 106/62   03/26/24 110/60   03/12/24 108/64     Pulse Readings from Last 3 Encounters:   03/26/24 73   03/12/24 68   01/30/24 70 "       Constitutional:       Appearance: Normal appearance.   HENT:      Head: Normocephalic and atraumatic.      Nose: Nose normal.      Mouth: Mucous membranes are moist.   Eyes:      Extraocular Movements: Extraocular movements intact.      Conjunctiva/sclera: Conjunctivae normal.      Pupils: Pupils are equal, round, and reactive to light.    Pulmonary:      Effort: Pulmonary effort is normal.    Skin:     General: Skin is warm and dry.   Psychiatric:        Affect normal.   Neurologic Exam     Mental Status   Oriented to person, place, and time.   Speech: speech is normal   Level of consciousness: alert    Cranial Nerves   Cranial nerves II through XII intact.     CN II   Visual acuity: decreased  Right visual field deficit: upper temporal and lower temporal quadrant(s)  Left visual field deficit: upper temporal and lower temporal quadrant(s)    CN III, IV, VI   Pupils are equal, round, and reactive to light.  Extraocular motions are normal.   CN III: no CN III palsy  CN VI: no CN VI palsy  Nystagmus: none     CN V   Facial sensation intact.     CN VII   Facial expression full, symmetric.     CN VIII   CN VIII normal.     CN IX, X   CN IX normal.   CN X normal.     CN XI   CN XI normal.     CN XII   CN XII normal.     Motor Exam   Muscle bulk: normal  Right arm pronator drift: absent  Left arm pronator drift: absent    Strength   Strength 5/5 except as noted.   Left biceps: 4/5  Left triceps: 4/5    Sensory Exam   Right arm light touch: normal  Left arm light touch: decreased from elbow (decreased after patient had lymph node dissection)  Right leg light touch: normal  Left leg light touch: normal    Gait, Coordination, and Reflexes     Gait  Gait: normal    Coordination   Romberg: negative  Finger to nose coordination: normal    Tremor   Resting tremor: absent    Reflexes   Right patellar: 2+  Left patellar: 2+      Pertinent lab results:   Lab Results   Component Value Date     03/10/2017    SODIUM 140  2023    K 4.1 2023     2023    CO2 31 2023    AGAP 6 2023    BUN 16 2023    CREATININE 0.94 2023    GLUC 84 2023    GLUF 98 2023    CALCIUM 9.8 2023    AST 28 2023    ALT 25 2023    ALKPHOS 67 2023    TP 6.9 2023    TBILI 0.50 2023    EGFR 67 2023      Lab Results   Component Value Date    WBC 4.7 2020    HGB 13.6 2020    HCT 40.1 2020    MCV 85 2020     2020      Lab Results   Component Value Date    YZT1GAXHQJXS 1.052 2023    TSH 1.84 2021         Pertinent Imagin/8/23 MRI Cervical spine: Multilevel cervical spondylosis, as described above.   Multifactorial disease results in severe left foraminal narrowing at C5-C6 and C6-C7.Minimal flattening of the left ventral aspect of the cord is present at C5-C6.        Review of Systems:     Constitutional:  Negative for appetite change, fatigue and fever.   HENT: Negative.  Negative for hearing loss, tinnitus, trouble swallowing and voice change.    Eyes: Negative.  Negative for photophobia, pain and visual disturbance.   Respiratory: Negative.  Negative for shortness of breath.    Cardiovascular: Negative.  Negative for palpitations.   Gastrointestinal: Negative.  Negative for nausea and vomiting.   Endocrine: Negative.  Negative for cold intolerance.   Genitourinary: Negative.  Negative for dysuria, frequency and urgency.   Musculoskeletal:  Positive for neck pain and neck stiffness. Negative for back pain, gait problem and myalgias.   Skin: Negative.  Negative for rash.   Allergic/Immunologic: Negative.    Neurological:  Positive for headaches. Negative for dizziness, tremors, seizures, syncope, facial asymmetry, speech difficulty, weakness, light-headedness and numbness.        Pt reports migraines are daily and has a variety of types of headaches.    Hematological: Negative.  Does not bruise/bleed easily.    Psychiatric/Behavioral: Negative.  Negative for confusion, hallucinations and sleep disturbance.       Reviewed ROS as entered by medical assistant.     I have spent a total time of 60 minutes on 03/28/24 in caring for this patient including Diagnostic results, Prognosis, Risks and benefits of tx options, Instructions for management, Patient and family education, Importance of tx compliance, Risk factor reductions, Impressions, Counseling / Coordination of care, Documenting in the medical record, Reviewing / ordering tests, medicine, procedures  , and Obtaining or reviewing history  .       Author:  MANDY Weber 3/28/2024 1:02 PM

## 2024-03-26 NOTE — PROGRESS NOTES
Review of Systems   Constitutional:  Negative for appetite change, fatigue and fever.   HENT: Negative.  Negative for hearing loss, tinnitus, trouble swallowing and voice change.    Eyes: Negative.  Negative for photophobia, pain and visual disturbance.   Respiratory: Negative.  Negative for shortness of breath.    Cardiovascular: Negative.  Negative for palpitations.   Gastrointestinal: Negative.  Negative for nausea and vomiting.   Endocrine: Negative.  Negative for cold intolerance.   Genitourinary: Negative.  Negative for dysuria, frequency and urgency.   Musculoskeletal:  Positive for neck pain and neck stiffness. Negative for back pain, gait problem and myalgias.   Skin: Negative.  Negative for rash.   Allergic/Immunologic: Negative.    Neurological:  Positive for headaches. Negative for dizziness, tremors, seizures, syncope, facial asymmetry, speech difficulty, weakness, light-headedness and numbness.        Pt reports migraines are daily and has a variety of types of headaches.    Hematological: Negative.  Does not bruise/bleed easily.   Psychiatric/Behavioral: Negative.  Negative for confusion, hallucinations and sleep disturbance.    All other systems reviewed and are negative.

## 2024-04-26 DIAGNOSIS — K21.9 GERD WITHOUT ESOPHAGITIS: ICD-10-CM

## 2024-04-26 RX ORDER — OMEPRAZOLE 40 MG/1
40 CAPSULE, DELAYED RELEASE ORAL DAILY
Qty: 90 CAPSULE | Refills: 1 | Status: SHIPPED | OUTPATIENT
Start: 2024-04-26

## 2024-04-28 DIAGNOSIS — G43.009 MIGRAINE WITHOUT AURA AND WITHOUT STATUS MIGRAINOSUS, NOT INTRACTABLE: ICD-10-CM

## 2024-04-29 RX ORDER — TOPIRAMATE 25 MG/1
TABLET ORAL
Qty: 120 TABLET | Refills: 0 | Status: SHIPPED | OUTPATIENT
Start: 2024-04-29

## 2024-04-30 ENCOUNTER — APPOINTMENT (OUTPATIENT)
Dept: LAB | Facility: HOSPITAL | Age: 59
End: 2024-04-30
Payer: COMMERCIAL

## 2024-04-30 DIAGNOSIS — Z00.00 ROUTINE GENERAL MEDICAL EXAMINATION AT A HEALTH CARE FACILITY: ICD-10-CM

## 2024-04-30 LAB
25(OH)D3 SERPL-MCNC: 47.4 NG/ML (ref 30–100)
PHOSPHATE SERPL-MCNC: 2.5 MG/DL (ref 2.7–4.5)
PTH-INTACT SERPL-MCNC: 64.6 PG/ML (ref 12–88)

## 2024-04-30 PROCEDURE — 84100 ASSAY OF PHOSPHORUS: CPT

## 2024-04-30 PROCEDURE — 82306 VITAMIN D 25 HYDROXY: CPT

## 2024-04-30 PROCEDURE — 83970 ASSAY OF PARATHORMONE: CPT

## 2024-04-30 PROCEDURE — 80053 COMPREHEN METABOLIC PANEL: CPT

## 2024-04-30 PROCEDURE — 36415 COLL VENOUS BLD VENIPUNCTURE: CPT

## 2024-05-08 LAB
ALBUMIN SERPL BCG-MCNC: 4.1 G/DL (ref 3.5–5)
ALP SERPL-CCNC: 54 U/L (ref 34–104)
ALT SERPL W P-5'-P-CCNC: 19 U/L (ref 7–52)
ANION GAP SERPL CALCULATED.3IONS-SCNC: 8 MMOL/L (ref 4–13)
AST SERPL W P-5'-P-CCNC: 20 U/L (ref 13–39)
BILIRUB SERPL-MCNC: 0.44 MG/DL (ref 0.2–1)
BUN SERPL-MCNC: 17 MG/DL (ref 5–25)
CALCIUM SERPL-MCNC: 9.1 MG/DL (ref 8.4–10.2)
CHLORIDE SERPL-SCNC: 107 MMOL/L (ref 96–108)
CO2 SERPL-SCNC: 26 MMOL/L (ref 21–32)
CREAT SERPL-MCNC: 0.86 MG/DL (ref 0.6–1.3)
GFR SERPL CREATININE-BSD FRML MDRD: 74 ML/MIN/1.73SQ M
GLUCOSE SERPL-MCNC: 87 MG/DL (ref 65–140)
POTASSIUM SERPL-SCNC: 3.9 MMOL/L (ref 3.5–5.3)
PROT SERPL-MCNC: 6.6 G/DL (ref 6.4–8.4)
SODIUM SERPL-SCNC: 141 MMOL/L (ref 135–147)

## 2024-05-10 ENCOUNTER — TELEPHONE (OUTPATIENT)
Age: 59
End: 2024-05-10

## 2024-05-10 NOTE — TELEPHONE ENCOUNTER
Diana called in to schedule a surgical clearance appointment for the start of next week. No available dates. Surgery is for cataracts and is scheduled for 5/16/24. She was just informed that she needs to set up appointment for clearance.

## 2024-05-13 ENCOUNTER — OFFICE VISIT (OUTPATIENT)
Dept: FAMILY MEDICINE CLINIC | Facility: HOSPITAL | Age: 59
End: 2024-05-13
Payer: COMMERCIAL

## 2024-05-13 VITALS
WEIGHT: 185 LBS | DIASTOLIC BLOOD PRESSURE: 70 MMHG | BODY MASS INDEX: 30.82 KG/M2 | HEART RATE: 71 BPM | SYSTOLIC BLOOD PRESSURE: 122 MMHG | HEIGHT: 65 IN | OXYGEN SATURATION: 98 %

## 2024-05-13 DIAGNOSIS — R79.89 ELEVATED PARATHYROID HORMONE: ICD-10-CM

## 2024-05-13 DIAGNOSIS — H26.9 CATARACT OF RIGHT EYE, UNSPECIFIED CATARACT TYPE: ICD-10-CM

## 2024-05-13 DIAGNOSIS — I47.10 SUPRAVENTRICULAR TACHYCARDIA: ICD-10-CM

## 2024-05-13 DIAGNOSIS — C82.31 GRADE 3A FOLLICULAR LYMPHOMA OF LYMPH NODES OF NECK (HCC): ICD-10-CM

## 2024-05-13 DIAGNOSIS — Z01.818 PRE-OP EXAMINATION: Primary | ICD-10-CM

## 2024-05-13 PROCEDURE — 99213 OFFICE O/P EST LOW 20 MIN: CPT | Performed by: NURSE PRACTITIONER

## 2024-05-13 NOTE — ASSESSMENT & PLAN NOTE
HX recurrent follicular lymphoma s/p lymphadenectomy with radiation therapy.  Followed by [facility] Dr. Madrigal.  Currently under surveillance.

## 2024-05-13 NOTE — PROGRESS NOTES
FAMILY PRACTICE PRE-OPERATIVE EVALUATION  [facility] PHYSICIAN GROUP [facility] PRIMARY CARE [address]    NAME: Brandy Ayala  AGE: 58 y.o. SEX: female  : 1965   DATE: 2024    History of Present Illness:     Brandy Ayala is a 58 y.o. female who presents to the office today for a preoperative consultation at the request of surgeon, Dr. Alvarado, who plans on performing Right cataract removal and insertion of lens on 24. Planned anesthesia is  TBD . Patient has a bleeding risk of: no recent abnormal bleeding, no remote history of abnormal bleeding, and use of verapamil . Patient does not have objections to receiving blood products if needed. Current anti-platelet/anti-coagulation medications that the patient is prescribed includes:  N/A .      Assessment of Chronic Conditions:   - see A/P     Assessment of Cardiac Risk:  Denies unstable or severe angina or MI in the last 6 weeks or history of stent placement in the last year   Denies decompensated heart failure (e.g. New onset heart failure, NYHA functional class IV heart failure, or worsening existing heart failure)  Denies significant arrhythmias such as high grade AV block, symptomatic ventricular arrhythmia, newly recognized ventricular tachycardia, supraventricular tachycardia with resting heart rate >100, or symptomatic bradycardia  Denies severe heart valve disease including aortic stenosis or symptomatic mitral stenosis     Exercise Capacity:  Able to walk 4 blocks without symptoms?: Yes  Able to walk 2 flights without symptoms?: Yes    Prior Anesthesia Reactions: No  Personal history of venous thromboembolic disease? No  History of steroid use for >2 weeks within last year? No          Review of Systems:     Review of Systems   Constitutional: Negative.  Negative for activity change, appetite change, chills, fatigue and fever.   HENT: Negative.  Negative for congestion, ear pain, postnasal drip and sinus pain.    Eyes:  Positive  for visual disturbance.   Respiratory: Negative.  Negative for cough and shortness of breath.    Cardiovascular:  Positive for palpitations. Negative for chest pain and leg swelling.        Chronic occasional palpitations, [facility]   Gastrointestinal: Negative.  Negative for constipation and diarrhea.   Endocrine: Negative.    Genitourinary: Negative.  Negative for dysuria.   Musculoskeletal:  Positive for arthralgias.   Skin: Negative.    Allergic/Immunologic: Negative.  Negative for immunocompromised state.   Neurological:  Positive for headaches. Negative for dizziness and light-headedness.   Hematological: Negative.    Psychiatric/Behavioral:  Positive for sleep disturbance.         Chronic due to neuropathic pain.  Able to sleep 5hours consistently.       Current Problem List:     Patient Active Problem List   Diagnosis    IBS (irritable bowel syndrome)    Iron deficiency    Migraine without aura and without status migrainosus, not intractable    Supraventricular tachycardia    Retinitis pigmentosa    Hypothyroidism due to Hashimoto's thyroiditis    Enhanced S-cone syndrome    History of colonic polyps    Peripheral visual field defect of both eyes    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)    Gastro-esophageal reflux disease with esophagitis    S/P MISHA (total abdominal hysterectomy)    Vitamin D deficiency    History of radiation to head and neck region    Postmenopausal    Elevated parathyroid hormone    Acute right-sided low back pain with right-sided sciatica    Class 1 obesity with body mass index (BMI) of 31.0 to 31.9 in adult    Follicular lymphoma of extranodal site excluding spleen and other solid organs (HCC)    Radiculopathy, cervical    Nasopharyngeal mass    S/P dissection of cervical lymph nodes    Subcapsular cataract    Neck pain    Shingles rash       Allergies:     Allergies   Allergen Reactions    Latex Rash and Hives    Iodinated Contrast Media Sneezing      Sneezing, tongue tingling  Sneezing, tongue tingling      Gabapentin Edema    Lyrica [Pregabalin] Edema    Other      Other reaction(s): hair dye causes rash  Orange coating    Rituximab Itching and Other (See Comments)     Felt hot all over and restless    Lansoprazole Rash       Current Medications:       Current Outpatient Medications:     calcium carbonate (Tums Ultra 1000) 1000 MG chewable tablet, Takes 1 as needed, Disp: , Rfl:     dorzolamide (TRUSOPT) 2 % ophthalmic solution, Apply 1 drop to eye 2 (two) times a day, Disp: , Rfl:     estradiol (ESTRACE) 0.5 MG tablet, Take 1 mg by mouth daily, Disp: , Rfl:     ibuprofen (MOTRIN) 200 mg tablet, Take 600 mg by mouth every 6 (six) hours as needed for mild pain, Disp: , Rfl:     ketorolac (ACULAR) 0.5 % ophthalmic solution, Use one drop 4x daily in the affected eye starting AFTER surgery, Disp: , Rfl:     levothyroxine 125 mcg tablet, TAKE 1 TABLET 6 DAYS A WEEK AND 1/2 TABLET ON SUNDAY (Patient taking differently: Take 1 tablet 6 days a week), Disp: 90 tablet, Rfl: 3    metoclopramide (REGLAN) 10 mg tablet, Take 1 tablet (10 mg total) by mouth every 6 (six) hours as needed (migraine/ nausea), Disp: 30 tablet, Rfl: 0    Multiple Vitamins-Minerals (MULTIVITAMIN GUMMIES WOMENS) CHEW, Chew 2 each daily, Disp: , Rfl:     NON FORMULARY, Cspsules--take 1 at bedtime( MMG), Disp: , Rfl:     omeprazole (PriLOSEC) 40 MG capsule, TAKE 1 CAPSULE (40 MG TOTAL) BY MOUTH DAILY., Disp: 90 capsule, Rfl: 1    prednisoLONE acetate (PRED FORTE) 1 % ophthalmic suspension, Use one drop 4x daily in the affected eye starting AFTER surgery, Disp: , Rfl:     topiramate (TOPAMAX) 25 mg tablet, TAKE 1 TAB BY MOUTH NIGHTLY FOR 1 WEEK, THEN INCREASE TO 2 TABS NIGHTLY FOR 1 WEEK, THEN INCREASE TO 3 TABS NIGHTLY FOR 1 WEEK, THEN CONTINUE AT 4 TABS NIGHTLY., Disp: 120 tablet, Rfl: 0    verapamil (CALAN-SR) 120 mg CR tablet, Take 120 mg by mouth, Disp: , Rfl:     VITAMIN D, ERGOCALCIFEROL,  PO, Take 5,000 Units by mouth daily, Disp: , Rfl:     ZOLMitriptan (ZOMIG-ZMT) 2.5 MG disintegrating tablet, Take 1 tablet (2.5 mg total) by mouth daily as needed for migraine, Disp: 18 tablet, Rfl: 3    Past Medical History:       Past Medical History:   Diagnosis Date    Cancer (HCC)     non-hogikins lymphoma     Colon polyp     Convergence insufficiency     bilateral eyes.    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 Now multiple times a week cor a few years    Movement disorder 2023    Mild leg tremors while in bed    Non Hodgkin's lymphoma (HCC) 2020    stage 1, had surgery and XRT to the neck( finished july 2020), rituximab in falll 2022 for recurrence in sinus    Overweight     Peripheral neuropathy 2015    Arms and feet    Retinitis pigmentosa     Shingles 12/23    Vision loss 1969    Head trauma from a fall        Past Surgical History:   Procedure Laterality Date    ANAL SPHINCTEROPLASTY      ANAL/SPHINCTEROPLASTY      ANTERIOR AND POSTERIOR VAGINAL REPAIR      BLADDER SUSPENSION      CATARACT EXTRACTION      COLONOSCOPY      DILATION AND CURETTAGE OF UTERUS      X 2    HYSTERECTOMY  2014    OOPHORECTOMY Bilateral 2014    with hysterectomy    TONSILLECTOMY AND ADENOIDECTOMY      UPPER GASTROINTESTINAL ENDOSCOPY      US GUIDED LYMPH NODE BIOPSY LEFT  04/21/2020        Family History   Problem Relation Age of Onset    Cancer Mother         lung    Lung cancer Mother 59    COPD Father     Substance Abuse Sister     HIV Sister     Drug abuse Sister     No Known Problems Daughter     No Known Problems Maternal Grandmother     Throat cancer Maternal Grandfather 73    Other Maternal Grandfather   "       Aortic valve replacement     No Known Problems Paternal Grandmother     No Known Problems Paternal Grandfather     Diabetes type II Brother         on insulin    No Known Problems Paternal Aunt     Mental illness Neg Hx     Colon cancer Neg Hx     Colon polyps Neg Hx         Social History     Socioeconomic History    Marital status: /Civil Union     Spouse name: Not on file    Number of children: Not on file    Years of education: Not on file    Highest education level: Not on file   Occupational History    Occupation: RN   Tobacco Use    Smoking status: Never    Smokeless tobacco: Never   Vaping Use    Vaping status: Never Used   Substance and Sexual Activity    Alcohol use: Yes     Comment: At occasions    Drug use: Yes     Frequency: 7.0 times per week     Types: Marijuana     Comment: Medical license obtained 10/2020    Sexual activity: Yes     Partners: Male     Birth control/protection: Male Sterilization, Female Sterilization   Other Topics Concern    Not on file   Social History Narrative    Dental care, regularly    Lives with spouse    Living situation: Supportive and safe    No alcohol use - As per [employer]    Social drinker - As per [employer]      Social Determinants of Health     Financial Resource Strain: Not on file   Food Insecurity: Not on file   Transportation Needs: No Transportation Needs (9/19/2019)    PRAPARE - Transportation     Lack of Transportation (Medical): No     Lack of Transportation (Non-Medical): No   Physical Activity: Not on file   Stress: Not on file   Social Connections: Not on file   Intimate Partner Violence: Not on file   Housing Stability: Not on file        Physical Exam:     /70   Pulse 71   Ht 5' 5\" (1.651 m)   Wt 83.9 kg (185 lb)   LMP  (LMP Unknown)   SpO2 98%   BMI 30.79 kg/m²     Physical Exam  Vitals and nursing note reviewed.   Constitutional:       Appearance: Normal appearance.   HENT:      Head: Normocephalic and atraumatic.      " Right Ear: Tympanic membrane, ear canal and external ear normal.      Left Ear: Tympanic membrane, ear canal and external ear normal.      Nose: Nose normal.      Mouth/Throat:      Mouth: Mucous membranes are moist.      Pharynx: Oropharynx is clear.   Eyes:      General: Visual field deficit present.      Extraocular Movements: Extraocular movements intact.      Conjunctiva/sclera: Conjunctivae normal.      Pupils: Pupils are equal, round, and reactive to light.   Cardiovascular:      Rate and Rhythm: Normal rate and regular rhythm.      Pulses: Normal pulses.      Heart sounds: Normal heart sounds.   Pulmonary:      Effort: Pulmonary effort is normal.      Breath sounds: Normal breath sounds.   Abdominal:      General: Bowel sounds are normal.      Palpations: Abdomen is soft.   Musculoskeletal:         General: Normal range of motion.      Cervical back: Normal range of motion and neck supple.   Skin:     General: Skin is warm and dry.   Neurological:      General: No focal deficit present.      Mental Status: She is alert and oriented to person, place, and time.   Psychiatric:         Mood and Affect: Mood normal.         Behavior: Behavior normal.         Thought Content: Thought content normal.         Judgment: Judgment normal.          Data:     Pre-operative work-up    Laboratory Results: I have personally reviewed the pertinent laboratory results/reports      Latest Reference Range & Units 04/30/24 10:39   Sodium 135 - 147 mmol/L 141   Potassium 3.5 - 5.3 mmol/L 3.9   Chloride 96 - 108 mmol/L 107   Carbon Dioxide 21 - 32 mmol/L 26   ANION GAP 4 - 13 mmol/L 8   BUN 5 - 25 mg/dL 17   Creatinine 0.60 - 1.30 mg/dL 0.86   GLUCOSE 65 - 140 mg/dL 87   Calcium 8.4 - 10.2 mg/dL 9.1   AST 13 - 39 U/L 20   ALT 7 - 52 U/L 19   ALK PHOS 34 - 104 U/L 54   Total Protein 6.4 - 8.4 g/dL 6.6   Albumin 3.5 - 5.0 g/dL 4.1   Total Bilirubin 0.20 - 1.00 mg/dL 0.44   GFR, Calculated ml/min/1.73sq m 74      4/9/24  1 mo ago     WBC  4.0 - 11.0 K/mm3 5.3   RBC  4.10 - 5.10 M/mm3 4.77   HGB  12.3 - 15.3 g/dL 13.7   HCT  35.9 - 44.6 % 40.6   MCV  80.0 - 96.0 fL 85.2   MCH  27.0 - 31.0 pg 28.7   MCHC  32.0 - 36.0 g/dL 33.7   RDW  11.5 - 14.5 % 13.2   PLT  150 - 450 K/mm3 281   MPV  7.4 - 10.4 fL 8.1   Differential Type AUTOMATED   Neutrophils %  % 68.1   Lymphocytes %  % 22.7   Monocytes %  % 7.1   Eosinophils %  % 1.5   Basophils %  % 0.6   Neutrophils Absolute  1.8 - 7.8 K/mm3 3.6   Lymphocytes Absolute  1.0 - 4.8 K/mm3 1.2   Monocytes Absolute  0.0 - 0.8 K/mm3 0.4   Eosinophils Absolute  0.0 - 0.5 K/mm3 0.1   Basophils Absolute  0.0 - 0.2 K/mm3 0.0     EKG:  N/A      Chest x-ray:  N/A    Previous cardiopulmonary studies within the past year:  Echocardiogram: N/A  Cardiac Catheterization: N/A  Stress Test: N/A  Pulmonary Function Testing: N/A      Assessment & Recommendations:     1. Pre-op examination    2. Cataract of right eye, unspecified cataract type    3. Supraventricular tachycardia  Assessment & Plan:  Followed by [facility], last OV 9/2023  -hx chronic palpitations without CP/pressure/dyspnea associated.    -Verapamil 120mg daily.        4. 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)  Assessment & Plan:  HX recurrent follicular lymphoma s/p lymphadenectomy with radiation therapy.  Followed by [facility] Dr. Madrigal.  Currently under surveillance.        5. Elevated parathyroid hormone  Assessment & Plan:  Followed by Endocrine.  Tx Vitamin D deficiency with 5000 IU daily. Repeat PTH WNL.  Phosphorus 2.5mg, admits to less appetite at that time due to increased Topamax.  Adjusted dietary intake     Latest Reference Range & Units 04/30/24 10:39   VITAMIN D, 25-HYDROXY 30.0 - 100.0 ng/mL 47.4   PTH 12.0 - 88.0 pg/mL 64.6             Pre-Op Evaluation Assessment  58 y.o. female with planned surgery: right cataract removal with lens insertion.    Known risk factors for perioperative complications: None.    Current  medications which may produce withdrawal symptoms if withheld perioperatively: N/A.    Pre-Op Evaluation Plan  1. Further preoperative workup as follows:   - None; no further preoperative work-up is required    2. Medication Management/Recommendations:   - None, continue medication regimen including morning of surgery, with sip of water    3. Prophylaxis for cardiac events with perioperative beta-blockers: not indicated.    4. Patient requires further consultation with: None    Clearance  Patient is CLEARED for surgery without any additional cardiac testing.     MANDY Cueva  [facility] CARE [address] [place], [place] [address] [address] [address]  Phone#  [phone]  Fax#  [phone]

## 2024-05-13 NOTE — ASSESSMENT & PLAN NOTE
[facility] OV 9/2023  -hx chronic palpitations without CP/pressure/dyspnea associated.    -Verapamil 120mg daily.

## 2024-05-13 NOTE — ASSESSMENT & PLAN NOTE
Followed by Endocrine.  Tx Vitamin D deficiency with 5000 IU daily. Repeat PTH WNL.  Phosphorus 2.5mg, admits to less appetite at that time due to increased Topamax.  Adjusted dietary intake     Latest Reference Range & Units 04/30/24 10:39   VITAMIN D, 25-HYDROXY 30.0 - 100.0 ng/mL 47.4   PTH 12.0 - 88.0 pg/mL 64.6

## 2024-05-24 DIAGNOSIS — G43.009 MIGRAINE WITHOUT AURA AND WITHOUT STATUS MIGRAINOSUS, NOT INTRACTABLE: ICD-10-CM

## 2024-05-24 RX ORDER — TOPIRAMATE 25 MG/1
TABLET ORAL
Qty: 360 TABLET | Refills: 0 | Status: SHIPPED | OUTPATIENT
Start: 2024-05-24

## 2024-06-24 ENCOUNTER — TELEPHONE (OUTPATIENT)
Dept: ENDOCRINOLOGY | Facility: HOSPITAL | Age: 59
End: 2024-06-24

## 2024-06-25 ENCOUNTER — TELEPHONE (OUTPATIENT)
Dept: ENDOCRINOLOGY | Facility: HOSPITAL | Age: 59
End: 2024-06-25

## 2024-06-25 ENCOUNTER — OFFICE VISIT (OUTPATIENT)
Dept: NEUROLOGY | Facility: CLINIC | Age: 59
End: 2024-06-25

## 2024-06-25 VITALS
BODY MASS INDEX: 31.25 KG/M2 | DIASTOLIC BLOOD PRESSURE: 64 MMHG | HEIGHT: 65 IN | HEART RATE: 80 BPM | SYSTOLIC BLOOD PRESSURE: 130 MMHG | WEIGHT: 187.6 LBS | TEMPERATURE: 98 F

## 2024-06-25 DIAGNOSIS — G43.009 MIGRAINE WITHOUT AURA AND WITHOUT STATUS MIGRAINOSUS, NOT INTRACTABLE: Primary | ICD-10-CM

## 2024-06-25 DIAGNOSIS — R20.2 PARESTHESIA: ICD-10-CM

## 2024-06-25 RX ORDER — PROPRANOLOL HYDROCHLORIDE 20 MG/1
20 TABLET ORAL EVERY 12 HOURS SCHEDULED
Qty: 30 TABLET | Refills: 3 | Status: SHIPPED | OUTPATIENT
Start: 2024-06-25

## 2024-06-25 RX ORDER — METHOCARBAMOL 500 MG/1
500 TABLET, FILM COATED ORAL DAILY PRN
Qty: 30 TABLET | Refills: 0 | Status: SHIPPED | OUTPATIENT
Start: 2024-06-25

## 2024-06-25 RX ORDER — ZOLMITRIPTAN 2.5 MG/1
2.5 TABLET, ORALLY DISINTEGRATING ORAL DAILY PRN
Qty: 18 TABLET | Refills: 3 | Status: SHIPPED | OUTPATIENT
Start: 2024-06-25

## 2024-06-25 RX ORDER — ZOLMITRIPTAN 2.5 MG/1
2.5 TABLET, ORALLY DISINTEGRATING ORAL DAILY PRN
Qty: 18 TABLET | Refills: 3 | Status: SHIPPED | OUTPATIENT
Start: 2024-06-25 | End: 2024-06-25

## 2024-06-25 NOTE — PROGRESS NOTES
[facility]  PATIENT:  Brandy Ayala  MRN:  [id number]  :  1965  DATE OF SERVICE:  2024  REFERRED BY: No ref. provider found  PCP: Marti Lema DO    Assessment/Plan:     Migraine without aura and without status migrainosus, not intractable  She reports approximately 2-3 migraine days per week which seem to be linked to the days that she is working on her computer.  She has a longstanding history of migraines which started when she was a teenager and seemed to coincide with her menstrual cycle.  Recently, they have become more frequent and can occur all throughout the month.  She was able to titrate up to 100mg of Topamax daily (she takes 50mg BID).  Zomig is effective at eliminating the bad headaches if she catches them early enough but she feels like she is using it quite frequently.   Workup:  Patient is concerned with her increased frequency of migraines. When she was younger, they would only occur during menses and now they are throughout the month.  Will obtain MRI brain to rule out any structural abnormalities that could be causing her symptoms  Preventative:  We discussed headache hygiene and lifestyle factors that may improve headaches  Continue Topamax 50mg BID (she may wish to wean from this in the near future. Advised her to contact me for weaning schedule)  Start propranolol 20mg BID. Reach out to Cardiologist at [facility] (patient being treated for SVT and may only need one medication). We discussed CGRP inhibitor as next line option if this fails or if cardiology recommends that she doesn't use this medication  Past/ failed/contraindicated: Cymbalta (prescribed for pain but didn't help with headaches), Gabapentin (made her swell up)  Future options: Propranolol, amitriptyline, CGRP med, botox  Acute:  Discussed not taking over-the-counter or prescription pain medications more than 3 days per week to prevent medication overuse/rebound headache  Continue Zomig 2.5mg at the  onset of a migraine.  May be repeated 2 hours later if not completely headache free. No more than 2 tabs in 24 hours  Start robaxin 500mg QHS prn neck/head pain  Past/ failed/contraindicated: Imitrex (ineffective)  Future options:  ubrelvy, reyvow, nurtec       Diagnoses and all orders for this visit:    Migraine without aura and without status migrainosus, not intractable  -     methocarbamol (ROBAXIN) 500 mg tablet; Take 1 tablet (500 mg total) by mouth daily as needed for muscle spasms  -     propranolol (INDERAL) 20 mg tablet; Take 1 tablet (20 mg total) by mouth every 12 (twelve) hours  -     Discontinue: ZOLMitriptan (ZOMIG-ZMT) 2.5 MG disintegrating tablet; Take 1 tablet (2.5 mg total) by mouth daily as needed for migraine  -     MRI brain without contrast; Future  -     ZOLMitriptan (ZOMIG-ZMT) 2.5 MG disintegrating tablet; Take 1 tablet (2.5 mg total) by mouth daily as needed for migraine Take 1 tablet by mouth at the earliest onset of migraine. May repeat again in 2 hours if not completely headache free.  No more than 2 tablets in 24 hours.    Paresthesia  -     Vitamin B6; Future  -     Vitamin B12/Folate, Serum Panel; Future  -     Vitamin B6  -     Vitamin B12/Folate, Serum Panel           Patient should follow up in 2 months, or I would be happy to see her sooner if needed.  Patient should call the office or send a MyChart message with any questions or concerns.  Patient should present to the nearest emergency department with any concerning symptoms.     CC:     We had the pleasure of evaluating Brandy in neurological follow up today. she is a 58 y.o. year-old female who presents today for evaluation of headaches.     History obtained from patient as well as available medical record review.    History of Present Illness:     Patient last office visit was with myself on 3/26/24.  For review: Headaches started at what age? As a teenager, she experiences migraines related to her menstrual cycle.  After  her hysterectomy in 2010, her headaches lessened by never went away.  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  She had an MRI of her neck for ongoing neck pain and had an appointment with the neurosurgeon but they did not offer surgery and recommended conservative management at that time. Face pressure and headache improved slightly after she had her lymphoma treated (nasopharynx 2022) but the pain never went fully away. She was experiencing approximately 4 migraines per week    Interval history as of 6/25/24:  She is still waiting for her new glasses and she is hopeful that once she stabilizes on the new prescription, that her headaches may decrease in frequency. 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  She is still experiencing approximately 2-3 migraine headaches per week even after titrating up to 100mg of Topamax daily (she takes 50mg BID).  Zomig is effective at eliminating the bad headaches if she catches them early enough but she feels like she is using it quite frequently.      Other problems discussed this visit:     She reports ongoing neck pain and stiffness. Sleeping wrong can increase her neck pain and can trigger her migraines at times.  She has tried PT in the past which has been somewhat helpful. She had a consult with a neurosurgeon who recommended continued conservative management of her symptoms.     She wanted to report leg shaking and paresthesias bilaterally that started occurring 6 months ago or more.  She reported thinking that the dog was shivering in the bed but found out that it was her legs.  She denies any pain or new onset weakness in her bilateral lower extremities.      How often do the headaches occur? 2-3 days per week-every day she works on the screen.   What time of the day do the headaches start?  Usually  begins after she is working on her computer for 5 or 6 hours.   How long do the headaches last? Can last hours to days  Are you ever headache free? Yes  Aura? without aura  Where is your headache located and pain quality? Right side of sinus and behind her right eye and side and back of her head.   What is the intensity of pain? Worst 7/10, Average: 5/10  Associated symptoms:   [] Nausea       [] Vomiting        [] Diarrhea  [x] Stiff or sore neck   [x] Problems with concentration  [x] Photophobia     []Phonophobia      [] Osmophobia  [] Blurred vision   [x] Prefer quiet, dark room  [] Light-headed or dizzy     [x] Tinnitus   [] Hands or feet tingle or feel numb/paresthesias    [] Ptosis      [] Facial droop  [] Lacrimation  [] Nasal congestion/rhinorrhea        Things that make the headache worse? Working on her computer    Headache triggers:  blue lights, screens, alcohol, salty foods    Have you seen someone else for headaches or pain? No  Have you had trigger point injection performed and how often? Yes  Have you had Botox injection performed and how often? No   Have you had epidural injections or transforaminal injections performed? No  Are you current pregnant or planning on getting pregnant? No. Patient had hysterectomy.    Have you ever had any Brain imaging? 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 2) Subcapsular cataract 3) Cystoid macular edema of both eyes     What medications do you take or have you taken for your headaches?:    ABORTIVE:    OTC medications: ibuprofen 800mg  Prescription: Zomig 2.5mg (effective if she catches early)  Past/failed/contraindicated: Imitrex (stopped working)    PREVENTIVE:   OTC medications: none  Prescription: Topamax 50mg BID  Past/failed/contraindicated: cymbalta, verapamil    Lifestyle history/modifications:    Sleep   Averages: solid 5 hours of  sleep then tosses and turns secondary to hip, neck pain  Water: Drinks maybe 4oz during the work day.   Caffeine: 1 cup per day   Mood: Some anxiety and depression related to her medical condition    Pertinent family history:  Family history of headaches:  no known family members with significant headaches  Any family history of aneurysms - No    Pertinent social history:  Work: Works as a nurse  Education: college  Lives with   Illicit Drugs: denies  Alcohol/tobacco: Denies alcohol use    Past Medical History:     Past Medical History:   Diagnosis Date    Cancer (HCC)     non-hogikins lymphoma     Colon polyp     Convergence insufficiency     bilateral eyes.    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 Now multiple times a week cor a few years    Movement disorder 2023    Mild leg tremors while in bed    Non Hodgkin's lymphoma (HCC) 2020    stage 1, had surgery and XRT to the neck( finished july 2020), rituximab in falll 2022 for recurrence in sinus    Overweight     Peripheral neuropathy 2015    Arms and feet    Retinitis pigmentosa     Shingles 12/23    Vision loss 1969    Head trauma from a fall       Past Surgical History:   Procedure Laterality Date    ANAL SPHINCTEROPLASTY      ANAL/SPHINCTEROPLASTY      ANTERIOR AND POSTERIOR VAGINAL REPAIR      BLADDER SUSPENSION      CATARACT EXTRACTION      COLONOSCOPY      DILATION AND CURETTAGE OF UTERUS      X 2    HYSTERECTOMY  2014    OOPHORECTOMY Bilateral 2014    with hysterectomy    TONSILLECTOMY AND ADENOIDECTOMY      UPPER GASTROINTESTINAL ENDOSCOPY      US GUIDED LYMPH NODE BIOPSY LEFT  04/21/2020       Patient Active Problem List    Diagnosis    IBS (irritable bowel syndrome)    Iron deficiency    Migraine without aura and without status migrainosus, not intractable    Supraventricular tachycardia    Retinitis pigmentosa    Hypothyroidism due to Hashimoto's thyroiditis    Enhanced S-cone syndrome    History of colonic polyps    Peripheral visual field defect of both eyes    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)    Gastro-esophageal reflux disease with esophagitis    S/P MISHA (total abdominal hysterectomy)    Vitamin D deficiency    History of radiation to head and neck region    Postmenopausal    Elevated parathyroid hormone    Acute right-sided low back pain with right-sided sciatica    Class 1 obesity with body mass index (BMI) of 31.0 to 31.9 in adult    Follicular lymphoma of extranodal site excluding spleen and other solid organs (HCC)    Radiculopathy, cervical    Nasopharyngeal mass    S/P dissection of cervical lymph nodes    Subcapsular cataract    Neck pain    Shingles rash       Medications:     Current Outpatient Medications   Medication Sig Dispense Refill    calcium carbonate (Tums Ultra 1000) 1000 MG chewable tablet Takes 1 as needed      dorzolamide (TRUSOPT) 2 % ophthalmic solution Administer 1 drop to the right eye 2 (two) times a day      estradiol (ESTRACE) 0.5 MG tablet Take 1 mg by mouth daily      ibuprofen (MOTRIN) 200 mg tablet Take 600 mg by mouth every 6 (six) hours as needed for mild pain      ketorolac (ACULAR) 0.5 % ophthalmic solution Use one drop 4x daily in the affected eye starting AFTER surgery      levothyroxine 125 mcg tablet TAKE 1 TABLET 6 DAYS A WEEK AND 1/2 TABLET ON SUNDAY (Patient taking differently: Take 1 tablet 6 days a week) 90 tablet 3    methocarbamol (ROBAXIN) 500 mg tablet Take 1 tablet (500 mg total) by mouth daily as needed for muscle spasms 30 tablet 0    Multiple Vitamins-Minerals (MULTIVITAMIN GUMMIES WOMENS) CHEW Chew 2 each daily      NON FORMULARY Cspsules--take 1  at bedtime( MMG)      omeprazole (PriLOSEC) 40 MG capsule TAKE 1 CAPSULE (40 MG TOTAL) BY MOUTH DAILY. 90 capsule 1    prednisoLONE acetate (PRED FORTE) 1 % ophthalmic suspension Use one drop 4x daily in the affected eye starting AFTER surgery      propranolol (INDERAL) 20 mg tablet Take 1 tablet (20 mg total) by mouth every 12 (twelve) hours 30 tablet 3    topiramate (TOPAMAX) 25 mg tablet TAKE 1 TAB BY MOUTH NIGHTLY FOR 1 WEEK, THEN INCREASE TO 2 TABS NIGHTLY FOR 1 WEEK, THEN INCREASE TO 3 TABS NIGHTLY FOR 1 WEEK, THEN CONTINUE AT 4 TABS NIGHTLY. (Patient taking differently: Take 50 mg by mouth 2 (two) times a day TAKE 1 TAB BY MOUTH NIGHTLY FOR 1 WEEK, THEN INCREASE TO 2 TABS NIGHTLY FOR 1 WEEK, THEN INCREASE TO 3 TABS NIGHTLY FOR 1 WEEK, THEN CONTINUE AT 4 TABS NIGHTLY.) 360 tablet 0    verapamil (CALAN-SR) 120 mg CR tablet Take 120 mg by mouth daily at bedtime      VITAMIN D, ERGOCALCIFEROL, PO Take 5,000 Units by mouth daily      ZOLMitriptan (ZOMIG-ZMT) 2.5 MG disintegrating tablet Take 1 tablet (2.5 mg total) by mouth daily as needed for migraine Take 1 tablet by mouth at the earliest onset of migraine. May repeat again in 2 hours if not completely headache free.  No more than 2 tablets in 24 hours. 18 tablet 3    metoclopramide (REGLAN) 10 mg tablet Take 1 tablet (10 mg total) by mouth every 6 (six) hours as needed (migraine/ nausea) (Patient not taking: Reported on 6/25/2024) 30 tablet 0     No current facility-administered medications for this visit.        Allergies:     Allergies   Allergen Reactions    Latex Rash and Hives    Iodinated Contrast Media Sneezing     Sneezing, tongue tingling  Sneezing, tongue tingling      Gabapentin Edema    Lyrica [Pregabalin] Edema    Other      Other reaction(s): hair dye causes rash  Orange coating    Rituximab Itching and Other (See Comments)     Felt hot all over and restless    Lansoprazole Rash       Family History:     Family History   Problem Relation Age of  Onset    Cancer Mother         lung    Lung cancer Mother 59    COPD Father     Substance Abuse Sister     HIV Sister     Drug abuse Sister     No Known Problems Daughter     No Known Problems Maternal Grandmother     Throat cancer Maternal Grandfather 73    Other Maternal Grandfather         Aortic valve replacement     No Known Problems Paternal Grandmother     No Known Problems Paternal Grandfather     Diabetes type II Brother         on insulin    No Known Problems Paternal Aunt     Mental illness Neg Hx     Colon cancer Neg Hx     Colon polyps Neg Hx        Social History:     Social History     Socioeconomic History    Marital status: /Civil Union     Spouse name: Not on file    Number of children: Not on file    Years of education: Not on file    Highest education level: Not on file   Occupational History    Occupation: RN   Tobacco Use    Smoking status: Never    Smokeless tobacco: Never   Vaping Use    Vaping status: Never Used   Substance and Sexual Activity    Alcohol use: Yes     Comment: At occasions    Drug use: Yes     Frequency: 7.0 times per week     Types: Marijuana     Comment: Medical license obtained 10/2020    Sexual activity: Yes     Partners: Male     Birth control/protection: Male Sterilization, Female Sterilization   Other Topics Concern    Not on file   Social History Narrative    Dental care, regularly    Lives with spouse    Living situation: Supportive and safe    No alcohol use - As per [employer]    Social drinker - As per [employer]      Social Determinants of Health     Financial Resource Strain: Not on file   Food Insecurity: Not on file   Transportation Needs: No Transportation Needs (9/19/2019)    PRAPARE - Transportation     Lack of Transportation (Medical): No     Lack of Transportation (Non-Medical): No   Physical Activity: Not on file   Stress: Not on file   Social Connections: Not on file   Intimate Partner Violence: Not on file   Housing Stability: Not on file  "      Objective:     Physical Exam:    Vitals:  /64 (BP Location: Right arm, Patient Position: Sitting, Cuff Size: Standard)   Pulse 80   Temp 98 °F (36.7 °C) (Temporal)   Ht 5' 5\" (1.651 m)   Wt 85.1 kg (187 lb 9.6 oz)   LMP  (LMP Unknown)   BMI 31.22 kg/m²   BP Readings from Last 3 Encounters:   24 130/64   24 122/70   24 106/62     Pulse Readings from Last 3 Encounters:   24 80   24 71   24 73       On neurological examination the patient was awake, alert, attentive, oriented to person, place, and time. Recent and remote memory intact to conversation with no evidence of language dysfunction. Satisfactory fund of knowledge. Normal attention span and concentration.  Mood, affect and judgement are appropriate. Speech is fluent without dysarthria or aphasia. Face appears symmetric, with no obvious weakness noted.  Audition is intact to casual conversation.  Eye movements are intact.  Able to move bilateral upper extremities antigravity without difficulty.      Pertinent lab results:   Lab Results   Component Value Date     03/10/2017    SODIUM 141 2024    K 3.9 2024     2024    CO2 26 2024    AGAP 8 2024    BUN 17 2024    CREATININE 0.86 2024    GLUC 87 2024    GLUF 98 2023    CALCIUM 9.1 2024    AST 20 2024    ALT 19 2024    ALKPHOS 54 2024    TP 6.6 2024    TBILI 0.44 2024    EGFR 74 2024      Lab Results   Component Value Date    WBC 4.7 2020    HGB 13.6 2020    HCT 40.1 2020    MCV 85 2020     2020      Lab Results   Component Value Date    GRN0GOKPLHRB 1.052 2023    TSH 1.84 2021         Pertinent Imagin/8/23 MRI Cervical spine: Multilevel cervical spondylosis, as described above.   Multifactorial disease results in severe left foraminal narrowing at C5-C6 and C6-C7.Minimal flattening of the left ventral " aspect of the cord is present at C5-C6.      Review of Systems:     Constitutional:  Negative for appetite change, fatigue and fever.   HENT: Negative.  Negative for hearing loss, tinnitus, trouble swallowing and voice change.    Eyes: Negative.  Negative for photophobia, pain and visual disturbance.   Respiratory: Negative.  Negative for shortness of breath.    Cardiovascular: Negative.  Negative for palpitations.   Gastrointestinal: Negative.  Negative for nausea and vomiting.   Endocrine: Negative.  Negative for cold intolerance.   Genitourinary: Negative.  Negative for dysuria, frequency and urgency.   Musculoskeletal:  Positive for neck pain and neck stiffness. Negative for back pain, gait problem and myalgias.   Skin: Negative.  Negative for rash.   Allergic/Immunologic: Negative.    Neurological:  Positive for headaches. Negative for dizziness, tremors, seizures, syncope, facial asymmetry, speech difficulty, weakness, light-headedness and numbness.   Hematological: Negative.  Does not bruise/bleed easily.   Psychiatric/Behavioral: Negative.  Negative for confusion, hallucinations and sleep disturbance.       Reviewed ROS as entered by medical assistant.     I have spent a total time of 45 minutes on 06/25/24 in caring for this patient including Diagnostic results, Prognosis, Risks and benefits of tx options, Instructions for management, Patient and family education, Importance of tx compliance, Risk factor reductions, Impressions, Counseling / Coordination of care, Documenting in the medical record, Reviewing / ordering tests, medicine, procedures  , and Obtaining or reviewing history  .       Author:  MANDY Weber 6/25/2024 11:15 AM

## 2024-06-25 NOTE — TELEPHONE ENCOUNTER
The office received a notification for Dr. Bennett from [facility] concerning a possible new diagnosis for blood work drawn on 4/30/2024 for a Vitamin D Level.      I spoke with Sonal via phone number [phone] and left her know that the I have a copy of the actual script the patient was given for that test with the proper diagnosis code on it and I will be faxing it to her at [phone].

## 2024-06-28 DIAGNOSIS — G43.009 MIGRAINE WITHOUT AURA AND WITHOUT STATUS MIGRAINOSUS, NOT INTRACTABLE: ICD-10-CM

## 2024-06-28 RX ORDER — TOPIRAMATE 25 MG/1
50 TABLET ORAL 2 TIMES DAILY
Qty: 360 TABLET | Refills: 1 | Status: SHIPPED | OUTPATIENT
Start: 2024-06-28

## 2024-06-28 NOTE — TELEPHONE ENCOUNTER
Medication: Topamax    Dose/Frequency: 25 mg/ 50 mg 2x/day    Quantity: 360    Pharmacy: [facility]    Office:   [] PCP/Provider -   [x] Speciality/Provider -     Does the patient have enough for 3 days?   [] Yes   [x] No - Send as HP to POD    I only had 1 tab last night and have zero left .

## 2024-07-15 DIAGNOSIS — G43.009 MIGRAINE WITHOUT AURA AND WITHOUT STATUS MIGRAINOSUS, NOT INTRACTABLE: ICD-10-CM

## 2024-07-15 RX ORDER — PROPRANOLOL HYDROCHLORIDE 40 MG/1
40 TABLET ORAL EVERY 12 HOURS SCHEDULED
Qty: 60 TABLET | Refills: 3 | Status: SHIPPED | OUTPATIENT
Start: 2024-07-15

## 2024-07-15 NOTE — TELEPHONE ENCOUNTER
Patient tolerating medication well. Can increase to 40mg BID at this juncture. Refill sent to pharmacy.

## 2024-08-13 ENCOUNTER — HOSPITAL ENCOUNTER (OUTPATIENT)
Dept: MRI IMAGING | Facility: HOSPITAL | Age: 59
Discharge: HOME | End: 2024-08-13
Payer: COMMERCIAL

## 2024-08-13 DIAGNOSIS — G43.009 MIGRAINE WITHOUT AURA AND WITHOUT STATUS MIGRAINOSUS, NOT INTRACTABLE: ICD-10-CM

## 2024-08-13 PROCEDURE — 70551 MRI BRAIN STEM W/O DYE: CPT

## 2024-09-03 ENCOUNTER — TELEPHONE (OUTPATIENT)
Age: 59
End: 2024-09-03

## 2024-09-03 DIAGNOSIS — R20.2 PARESTHESIA: Primary | ICD-10-CM

## 2024-09-03 DIAGNOSIS — Z12.31 SCREENING MAMMOGRAM, ENCOUNTER FOR: Primary | ICD-10-CM

## 2024-09-03 NOTE — TELEPHONE ENCOUNTER
Pt called and requested that the lab orders that were placed on 6/25/24 be re-entered with [facility] lab as pt's preferred lab. The orders that were entered were for [facility]. Made pt aware that she can still go to [facility] to have them drawn. She is requesting to have them reordered because she does not want the results to go into the [facility] portal, she wants the results to come directly into her MyChart. Removed [facility] as pt's preferred lab, per her request. Routed to provider to reorder the Vitamin B12 and Vitamin B6 orders.

## 2024-09-03 NOTE — TELEPHONE ENCOUNTER
MANDY Weber  You; Neurology [place] [facility] minutes ago (11:57 AM)       New scripts entered for patient.  Thanks!

## 2024-09-03 NOTE — TELEPHONE ENCOUNTER
Patient called in regarding need for mammogram. States that she is due. Please advise if order can be placed. She would also like to get her cholesterol levels checked. She wants to get them done at [facility]. please advise.

## 2024-09-03 NOTE — TELEPHONE ENCOUNTER
Mammogram was ordered, patient notified she can call to schedule that. Also notified that provider is out of office this week and will get a return call next week about cholesterol test. Ok to order lipid panel?

## 2024-09-03 NOTE — TELEPHONE ENCOUNTER
Patients GI provider:  Dr. Ray     Number to return call: ( [phone]     Reason for call: Pt calling received a letter for recall she was seen in the office 3/26/24 to [facility] EGD unsure if she needs to be seen before [facility] procedure need orders placed needs a FRIDAY     Scheduled procedure/appointment date if applicable: Apt/procedure

## 2024-09-05 ENCOUNTER — PREP FOR PROCEDURE (OUTPATIENT)
Dept: GASTROENTEROLOGY | Facility: CLINIC | Age: 59
End: 2024-09-05

## 2024-09-05 DIAGNOSIS — Z86.010 HISTORY OF COLONIC POLYPS: Primary | ICD-10-CM

## 2024-09-05 NOTE — TELEPHONE ENCOUNTER
Patient said she has difficulty with the 2 day bowel prep. She would like to take her entire bowel prep the day before the procedure. Is patient ok to do this and what bowel prep should patient use?    Patient will be having a ct with IV contrast on 10/15/24 and wanted to make sure she can have colon 10/18/24.

## 2024-09-05 NOTE — TELEPHONE ENCOUNTER
09/05/24  Screened by: Mery Ocampo    Referring Provider     Pre- Screening:     There is no height or weight on file to calculate BMI.  Has patient been referred for a routine screening Colonoscopy? yes  Is the patient between 45-75 years old? yes      Previous Colonoscopy yes   If yes:    Date: 2021    Facility: [facility]    Reason: screening      SCHEDULING STAFF: If the patient is between 45yrs-49yrs, please advise patient to confirm benefits/coverage with their insurance company for a routine screening colonoscopy, some insurance carriers will only cover at 50yrs or older. If the patient is over 75years old, please schedule an office visit.     Does the patient want to see a Gastroenterologist prior to their procedure OR are they having any GI symptoms? no    Has the patient been hospitalized or had abdominal surgery in the past 6 months? no    Does the patient use supplemental oxygen? no    Does the patient take Coumadin, Lovenox, Plavix, Elliquis, Xarelto, or other blood thinning medication? no    Has the patient had a stroke, cardiac event, or stent placed in the past year? no    SCHEDULING STAFF: If patient answers NO to above questions, then schedule procedure. If patient answers YES to above questions, then schedule office appointment.     If patient is between 45yrs - 49yrs, please advise patient that we will have to confirm benefits & coverage with their insurance company for a routine screening colonoscopy.

## 2024-09-05 NOTE — TELEPHONE ENCOUNTER
Scheduled date of colonoscopy (as of today):10/18/2024  Physician performing colonoscopy:Dr Ray  Location of colonoscopy: [facility]  Bowel prep reviewed with patient:TBD  Instructions reviewed with patient by:patient would like emailed   Clearances: none

## 2024-09-10 DIAGNOSIS — E78.00 HYPERCHOLESTEROLEMIA: Primary | ICD-10-CM

## 2024-09-29 DIAGNOSIS — G43.009 MIGRAINE WITHOUT AURA AND WITHOUT STATUS MIGRAINOSUS, NOT INTRACTABLE: ICD-10-CM

## 2024-09-30 DIAGNOSIS — K21.9 GERD WITHOUT ESOPHAGITIS: ICD-10-CM

## 2024-09-30 RX ORDER — TOPIRAMATE 25 MG/1
50 TABLET, FILM COATED ORAL 2 TIMES DAILY
Qty: 360 TABLET | Refills: 0 | Status: SHIPPED | OUTPATIENT
Start: 2024-09-30 | End: 2024-10-01 | Stop reason: SDUPTHER

## 2024-09-30 NOTE — TELEPHONE ENCOUNTER
"Reason for call:   [] Refill   [] Prior Auth  [x] Other:     Patient state she is out of the medication, says she did not realized that there was no refills left and she should not stop this medication abruptly, she is afraid that she may have a seizure or something and that she don't have any weird side effect.    Patient advised that a there is a remaining refill at the pharmacy, when it was last ordered 06.28.2024, it was ordered for 360 with 1 refill, patient was very anxious and state \"I am telling you there is no more refills, I am on automatic refills and they call me\". I apologize to patient and advised I am just trying to avoid further discrepancies and any hold up, patient state she need this today and she will appreciate if we send to the pharmacy.    "

## 2024-09-30 NOTE — PATIENT INSTRUCTIONS
Patient Instructions:    Additional Testing  -complete blood work at your convenience    Headache/migraine treatment:     Prevention  -To take every day to help prevent headaches - not to take at the time of headache:  -Continue Topamax 50mg twice daily   -Continue propranolol 20mg twice daily  -Over the counter preventive supplements for headaches/migraines (if you try, try for 3 months straight):  -Magnesium 400mg daily (If any diarrhea or upset stomach, decrease dose as tolerated)  -Riboflavin (Vitamin B2) 400mg daily (may make your urine bright/neon yellow)  - All supplements can be purchased online    Abortive  -For immediate treatment of a headache/migraine  -For your more moderate to severe migraines take this medication as early as possible:  -Continue Zomig at the onset of a migraine  -Robaxin nightly as needed for neck/head pain     Lifestyle Recommendations:    -Remain well-hydrated drinking at least 48 to 64 ounces of noncaffeinated beverages per day in addition to anything caffeinated.    -Getting adequate rest is also very important for migraine prevention (aim for 7-8 hours per night).     -Regular exercise is also beneficial for headache prevention.  I would encourage at the least 5 days of physical exercise weekly for at least 30 minutes.   -I would like for them to keep track of their migraines using an application on their phone or calendar as they see fit. Phone applications: Migraine Buddy or Migraine Diary.    Education and Follow-up:    -Please call or send a [employer] message with any questions or concerns. Please present to the emergency room with any concerning symptoms such as: worst headache of your life, sudden painless loss of vision or double vision, difficulty speaking or swallowing, vertigo/room spinning that does not quickly resolve, or weakness/numbness/loss of coordination affecting 1 side of the face or body.  -Follow up in 6 months or sooner if needed.

## 2024-09-30 NOTE — TELEPHONE ENCOUNTER
Patient called the RX Refill Line. Message is being forwarded to the office.     Patient called to check the status of her refill for Topamax 25 mg tablet. Informed patient that the medication was pending approval.     Please contact patient at [phone] with questions or concerns.

## 2024-10-01 ENCOUNTER — OFFICE VISIT (OUTPATIENT)
Dept: NEUROLOGY | Facility: CLINIC | Age: 59
End: 2024-10-01
Payer: COMMERCIAL

## 2024-10-01 VITALS
TEMPERATURE: 98.7 F | BODY MASS INDEX: 31.46 KG/M2 | HEART RATE: 59 BPM | HEIGHT: 65 IN | SYSTOLIC BLOOD PRESSURE: 100 MMHG | WEIGHT: 188.8 LBS | DIASTOLIC BLOOD PRESSURE: 68 MMHG

## 2024-10-01 DIAGNOSIS — G43.009 MIGRAINE WITHOUT AURA AND WITHOUT STATUS MIGRAINOSUS, NOT INTRACTABLE: ICD-10-CM

## 2024-10-01 PROCEDURE — 99214 OFFICE O/P EST MOD 30 MIN: CPT

## 2024-10-01 RX ORDER — PROPRANOLOL HCL 20 MG
20 TABLET ORAL 2 TIMES DAILY
Qty: 60 TABLET | Refills: 5 | Status: SHIPPED | OUTPATIENT
Start: 2024-10-01

## 2024-10-01 RX ORDER — ZOLMITRIPTAN 2.5 MG/1
2.5 TABLET, ORALLY DISINTEGRATING ORAL DAILY PRN
Qty: 18 TABLET | Refills: 5 | Status: SHIPPED | OUTPATIENT
Start: 2024-10-01

## 2024-10-01 RX ORDER — METHOCARBAMOL 500 MG/1
500 TABLET, FILM COATED ORAL DAILY PRN
Qty: 30 TABLET | Refills: 5 | Status: SHIPPED | OUTPATIENT
Start: 2024-10-01

## 2024-10-01 RX ORDER — OMEPRAZOLE 40 MG/1
40 CAPSULE, DELAYED RELEASE ORAL DAILY
Qty: 90 CAPSULE | Refills: 1 | Status: SHIPPED | OUTPATIENT
Start: 2024-10-01

## 2024-10-01 RX ORDER — TOPIRAMATE 50 MG/1
50 TABLET, FILM COATED ORAL 2 TIMES DAILY
Qty: 60 TABLET | Refills: 5 | Status: SHIPPED | OUTPATIENT
Start: 2024-10-01

## 2024-10-01 NOTE — ASSESSMENT & PLAN NOTE
She has been taking Topamax 50mg BID and propranolol 20mg BID. She has new glasses as well and has noticed a decrease in her headache frequency.  Last month she experienced about 2 migraines whereas before she was having 2-3 per week.  She has gone to part time work as well which she believes is helping- less screen time (avoiding working more than 2 days in a row).  She tried to cut back on her topamax, but noticed an increase in her headache frequency so she went back up to the current dosage.  We reviewed her brain MRI which came back without any structural abnormalities causing her symptoms.  She reports some forgetfulness along with occasional word-finding difficulties.  Denies doing any unsafe behaviors, recognizes familiar faces, and doesn't get lost going somewhere familiar.  Can consider additional work up if symptoms persist.   Workup:  Lab work for patient to complete at her convenience to rule out treatable causes for her symptoms  Preventative:  We discussed headache hygiene and lifestyle factors that may improve headaches  Continue Topamax 50mg BID (she may wish to wean from this in the near future. Advised her to contact me for weaning schedule)  Continue propranolol 20mg BID  Past/ failed/contraindicated: Cymbalta (prescribed for pain but didn't help with headaches), Gabapentin (made her swell up)  Future options:CGRP med, botox  Acute:  Discussed not taking over-the-counter or prescription pain medications more than 3 days per week to prevent medication overuse/rebound headache  Continue Zomig 2.5mg at the onset of a migraine.  May be repeated 2 hours later if not completely headache free. No more than 2 tabs in 24 hours  Start robaxin 500mg QHS prn neck/head pain  Past/ failed/contraindicated: Imitrex (ineffective)  Future options:  ubrelvy, reyvow, nurtec

## 2024-10-04 ENCOUNTER — ANESTHESIA EVENT (OUTPATIENT)
Dept: ANESTHESIOLOGY | Facility: AMBULATORY SURGERY CENTER | Age: 59
End: 2024-10-04

## 2024-10-04 ENCOUNTER — ANESTHESIA (OUTPATIENT)
Dept: ANESTHESIOLOGY | Facility: AMBULATORY SURGERY CENTER | Age: 59
End: 2024-10-04

## 2024-10-04 DIAGNOSIS — Z12.11 SCREENING FOR COLON CANCER: Primary | ICD-10-CM

## 2024-10-14 DIAGNOSIS — E55.9 VITAMIN D DEFICIENCY: ICD-10-CM

## 2024-10-14 DIAGNOSIS — E06.3 HYPOTHYROIDISM DUE TO HASHIMOTO'S THYROIDITIS: Primary | ICD-10-CM

## 2024-10-18 ENCOUNTER — HOSPITAL ENCOUNTER (OUTPATIENT)
Dept: GASTROENTEROLOGY | Facility: AMBULATORY SURGERY CENTER | Age: 59
Discharge: HOME | End: 2024-10-18
Payer: COMMERCIAL

## 2024-10-18 ENCOUNTER — ANESTHESIA (OUTPATIENT)
Dept: GASTROENTEROLOGY | Facility: AMBULATORY SURGERY CENTER | Age: 59
End: 2024-10-18

## 2024-10-18 ENCOUNTER — ANESTHESIA EVENT (OUTPATIENT)
Dept: GASTROENTEROLOGY | Facility: AMBULATORY SURGERY CENTER | Age: 59
End: 2024-10-18

## 2024-10-18 VITALS
TEMPERATURE: 97.7 F | WEIGHT: 188 LBS | HEIGHT: 65 IN | HEART RATE: 57 BPM | OXYGEN SATURATION: 95 % | RESPIRATION RATE: 13 BRPM | BODY MASS INDEX: 31.32 KG/M2 | DIASTOLIC BLOOD PRESSURE: 57 MMHG | SYSTOLIC BLOOD PRESSURE: 113 MMHG

## 2024-10-18 DIAGNOSIS — R19.7 DIARRHEA, UNSPECIFIED TYPE: Primary | ICD-10-CM

## 2024-10-18 DIAGNOSIS — Z86.0100 HISTORY OF COLONIC POLYPS: ICD-10-CM

## 2024-10-18 PROCEDURE — 45380 COLONOSCOPY AND BIOPSY: CPT | Performed by: INTERNAL MEDICINE

## 2024-10-18 PROCEDURE — 88305 TISSUE EXAM BY PATHOLOGIST: CPT | Performed by: PATHOLOGY

## 2024-10-18 RX ORDER — CHOLESTYRAMINE 4 G/9G
1 POWDER, FOR SUSPENSION ORAL DAILY
Qty: 30 PACKET | Refills: 12 | Status: SHIPPED | OUTPATIENT
Start: 2024-10-18

## 2024-10-18 RX ORDER — LIDOCAINE HYDROCHLORIDE 10 MG/ML
INJECTION, SOLUTION EPIDURAL; INFILTRATION; INTRACAUDAL; PERINEURAL AS NEEDED
Status: DISCONTINUED | OUTPATIENT
Start: 2024-10-18 | End: 2024-10-18

## 2024-10-18 RX ORDER — SODIUM CHLORIDE, SODIUM LACTATE, POTASSIUM CHLORIDE, CALCIUM CHLORIDE 600; 310; 30; 20 MG/100ML; MG/100ML; MG/100ML; MG/100ML
50 INJECTION, SOLUTION INTRAVENOUS CONTINUOUS
Status: DISCONTINUED | OUTPATIENT
Start: 2024-10-18 | End: 2024-10-22 | Stop reason: HOSPADM

## 2024-10-18 RX ORDER — PROPOFOL 10 MG/ML
INJECTION, EMULSION INTRAVENOUS AS NEEDED
Status: DISCONTINUED | OUTPATIENT
Start: 2024-10-18 | End: 2024-10-18

## 2024-10-18 RX ORDER — SODIUM CHLORIDE, SODIUM LACTATE, POTASSIUM CHLORIDE, CALCIUM CHLORIDE 600; 310; 30; 20 MG/100ML; MG/100ML; MG/100ML; MG/100ML
INJECTION, SOLUTION INTRAVENOUS CONTINUOUS PRN
Status: DISCONTINUED | OUTPATIENT
Start: 2024-10-18 | End: 2024-10-18

## 2024-10-18 RX ADMIN — LIDOCAINE HYDROCHLORIDE 50 MG: 10 INJECTION, SOLUTION EPIDURAL; INFILTRATION; INTRACAUDAL; PERINEURAL at 07:31

## 2024-10-18 RX ADMIN — PROPOFOL 30 MG: 10 INJECTION, EMULSION INTRAVENOUS at 07:44

## 2024-10-18 RX ADMIN — PROPOFOL 50 MG: 10 INJECTION, EMULSION INTRAVENOUS at 07:35

## 2024-10-18 RX ADMIN — PROPOFOL 40 MG: 10 INJECTION, EMULSION INTRAVENOUS at 07:46

## 2024-10-18 RX ADMIN — PROPOFOL 100 MG: 10 INJECTION, EMULSION INTRAVENOUS at 07:31

## 2024-10-18 RX ADMIN — PROPOFOL 30 MG: 10 INJECTION, EMULSION INTRAVENOUS at 07:41

## 2024-10-18 RX ADMIN — SODIUM CHLORIDE, SODIUM LACTATE, POTASSIUM CHLORIDE, CALCIUM CHLORIDE 50 ML/HR: 600; 310; 30; 20 INJECTION, SOLUTION INTRAVENOUS at 07:09

## 2024-10-18 RX ADMIN — SODIUM CHLORIDE, SODIUM LACTATE, POTASSIUM CHLORIDE, CALCIUM CHLORIDE: 600; 310; 30; 20 INJECTION, SOLUTION INTRAVENOUS at 07:23

## 2024-10-18 RX ADMIN — PROPOFOL 50 MG: 10 INJECTION, EMULSION INTRAVENOUS at 07:37

## 2024-10-18 RX ADMIN — PROPOFOL 50 MG: 10 INJECTION, EMULSION INTRAVENOUS at 07:39

## 2024-10-18 NOTE — H&P
History and Physical -  [facility]  Brandy Ayala 59 y.o. female MRN: [id number]    HPI: Brandy Ayala is a 59 y.o. year old female who presents for diarrhea and personal 0 polyps    REVIEW OF SYSTEMS: Per the HPI, and otherwise unremarkable.    Historical Information   Past Medical History:   Diagnosis Date    Cancer (HCC)     non-hogikins lymphoma     Colon polyp     Convergence insufficiency     bilateral eyes.    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 Now multiple times a week cor a few years    Movement disorder 2023    Mild leg tremors while in bed    Non Hodgkin's lymphoma (HCC) 2020    stage 1, had surgery and XRT to the neck( finished july 2020), rituximab in falll 2022 for recurrence in sinus    Overweight     Peripheral neuropathy 2015    Arms and feet    Retinitis pigmentosa     Shingles 12/23    Vision loss 1969    Head trauma from a fall     Past Surgical History:   Procedure Laterality Date    ANAL SPHINCTEROPLASTY      ANAL/SPHINCTEROPLASTY      ANTERIOR AND POSTERIOR VAGINAL REPAIR      BLADDER SUSPENSION      CATARACT EXTRACTION      COLONOSCOPY      DILATION AND CURETTAGE OF UTERUS      X 2    HYSTERECTOMY  2014    OOPHORECTOMY Bilateral 2014    with hysterectomy    TONSILLECTOMY AND ADENOIDECTOMY      UPPER GASTROINTESTINAL ENDOSCOPY      US GUIDED LYMPH NODE BIOPSY LEFT  04/21/2020     Social History   Social History     Substance and Sexual Activity   Alcohol Use Not Currently    Comment: At occasions     Social History     Substance and Sexual Activity   Drug Use Yes    Frequency: 7.0 times per week    Types: Marijuana    Comment: used for sleept used  10/16     Social History     Tobacco Use   Smoking Status Never   Smokeless Tobacco Never     Family History   Problem Relation Age of Onset    Cancer Mother         lung    Lung cancer Mother 59    COPD Father     Substance Abuse Sister     HIV Sister     Drug abuse Sister     No Known Problems Daughter     No Known Problems Maternal Grandmother     Throat cancer Maternal Grandfather 73    Other Maternal Grandfather         Aortic valve replacement     No Known Problems Paternal Grandmother     No Known Problems Paternal Grandfather     Diabetes type II Brother         on insulin    No Known Problems Paternal Aunt     Mental illness Neg Hx     Colon cancer Neg Hx     Colon polyps Neg Hx        Meds/Allergies       Current Outpatient Medications:     dorzolamide (TRUSOPT) 2 % ophthalmic solution    estradiol (ESTRACE) 0.5 MG tablet    levothyroxine 125 mcg tablet    Multiple Vitamins-Minerals (MULTIVITAMIN GUMMIES WOMENS) CHEW    NON FORMULARY    omeprazole (PriLOSEC) 40 MG capsule    propranolol (INDERAL) 20 mg tablet    topiramate (TOPAMAX) 50 MG tablet    verapamil (CALAN-SR) 120 mg CR tablet    VITAMIN D, ERGOCALCIFEROL, PO    calcium carbonate (Tums Ultra 1000) 1000 MG chewable tablet    ibuprofen (MOTRIN) 200 mg tablet    methocarbamol (ROBAXIN) 500 mg tablet    polyethylene glycol (GOLYTELY) 4000 mL solution    ZOLMitriptan (ZOMIG-ZMT) 2.5 MG disintegrating tablet    Current Facility-Administered Medications:     lactated ringers infusion, 50 mL/hr, Intravenous, Continuous, 50 mL/hr at 10/18/24 0709    Allergies   Allergen Reactions    Latex Rash and Hives    Iodinated Contrast Media Sneezing     Sneezing, tongue tingling  Sneezing, tongue tingling      Gabapentin Edema    Lyrica [Pregabalin] Edema    Other      Other reaction(s): hair dye causes rash  Orange coating    Rituximab Itching and Other (See Comments)     Felt hot all over and restless    Lansoprazole Rash       Objective     /59   Pulse 64   " Temp 97.7 °F (36.5 °C) (Temporal)   Resp 16   Ht 5' 5\" (1.651 m)   Wt 85.3 kg (188 lb)   LMP  (LMP Unknown)   SpO2 97%   BMI 31.28 kg/m²     PHYSICAL EXAM    Gen: NAD AAOx3  Head: Normocephalic, Atraumatic  CV: S1S2 RRR no m/r/g  CHEST: Clear b/l no c/r/w  ABD: soft, +BS NT/ND  EXT: no edema    ASSESSMENT/PLAN:  This is a 59 y.o. year old female here for diarrhea and personal history of polyps, and she is stable and optimized for her procedure.        "

## 2024-10-18 NOTE — ANESTHESIA PREPROCEDURE EVALUATION
Procedure:  COLONOSCOPY    Relevant Problems   CARDIO   (+) Migraine without aura and without status migrainosus, not intractable   (+) Supraventricular tachycardia (HCC)      ENDO   (+) Hypothyroidism due to Hashimoto's thyroiditis      HEMATOLOGY   (+) Follicular lymphoma of extranodal site excluding spleen and other solid organs (HCC)   (+) 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)      MUSCULOSKELETAL   (+) Acute right-sided low back pain with right-sided sciatica      NEURO/PSYCH   (+) Migraine without aura and without status migrainosus, not intractable        Physical Exam    Airway    Mallampati score: II  TM Distance: >3 FB  Neck ROM: full     Dental   No notable dental hx     Cardiovascular  Cardiovascular exam normal    Pulmonary  Pulmonary exam normal     Other Findings  post-pubertal.      Anesthesia Plan  ASA Score- 2     Anesthesia Type- IV sedation with anesthesia with ASA Monitors.         Additional Monitors:     Airway Plan:            Plan Factors-Exercise tolerance (METS): >4 METS.    Chart reviewed. EKG reviewed.  Existing labs reviewed. Patient summary reviewed.    Patient is not a current smoker.              Induction-     Postoperative Plan-     Perioperative Resuscitation Plan - Level 1 - Full Code.       Informed Consent- Anesthetic plan and risks discussed with patient.  I personally reviewed this patient with the CRNA. Discussed and agreed on the Anesthesia Plan with the CRNA..

## 2024-10-18 NOTE — ANESTHESIA POSTPROCEDURE EVALUATION
Post-Op Assessment Note    CV Status:  Stable  Pain Score: 0    Pain management: adequate       Mental Status:  Alert and awake   Hydration Status:  Euvolemic   PONV Controlled:  Controlled   Airway Patency:  Patent     Post Op Vitals Reviewed: Yes    No anethesia notable event occurred.    Staff: CRNA         Last Filed PACU Vitals:  Vitals Value Taken Time   Temp     Pulse 66 10/18/24 0755   /59 10/18/24 0755   Resp 19 10/18/24 0755   SpO2 98 % 10/18/24 0755       Modified Faith:  Activity: 2 (10/18/2024  8:10 AM)  Respiration: 2 (10/18/2024  8:10 AM)  Circulation: 2 (10/18/2024  8:10 AM)  Consciousness: 2 (10/18/2024  8:10 AM)  Oxygen Saturation: 2 (10/18/2024  8:10 AM)  Modified Faith Score: 10 (10/18/2024  8:10 AM)

## 2024-10-24 PROCEDURE — 88305 TISSUE EXAM BY PATHOLOGIST: CPT | Performed by: PATHOLOGY

## 2024-11-14 ENCOUNTER — TELEPHONE (OUTPATIENT)
Age: 59
End: 2024-11-14

## 2024-11-14 NOTE — TELEPHONE ENCOUNTER
Patient called in as her  was diagnosed with Covid Sat 11/9/24 . Patient has lymphoma and patient just recently diagnosed with Covid as well, and  was given a script for Paxlovid.    Patient asking if she can take Topiramate and Inderal with this medication? She needs to start Paxlovid by today, as soon as she gets the update from provider. Patient advised that there are times she calls into the office and does not get a call back for 3 days, but this is timely, as she needs to start med asap due to the short window of initiating medication. Patient aware I will call her back , once I see provider's updated msg. Patient was so appreciative.     Routed to provider to advise, as patient requesting a call back at , may leave a detailed msg.     Also sent provider a Secure Chat message also.

## 2024-11-14 NOTE — TELEPHONE ENCOUNTER
Per Provider's Secure Chat msg: Klarissa GALVAN: I do not see any drug interactions between paxlovid and topamax or inderal    ___________________________    I spoke to patient and informed her per provider's note above. Pt was appreciative and has no other concerns at this time

## 2024-11-17 ENCOUNTER — PATIENT MESSAGE (OUTPATIENT)
Dept: NEUROLOGY | Facility: CLINIC | Age: 59
End: 2024-11-17

## 2024-11-18 DIAGNOSIS — G43.009 MIGRAINE WITHOUT AURA AND WITHOUT STATUS MIGRAINOSUS, NOT INTRACTABLE: ICD-10-CM

## 2024-11-18 NOTE — TELEPHONE ENCOUNTER
Diana Ayala to [facility] (supporting MANDY Weber)         11/17/24  4:09 PM  Carlton Cyr ,          Now that I am pretty much settled on my doses , my insurance is requiring that you order 90 day supplies or I will have to pay out of pocket . I just got notified they won’t cover the Monthly scripts any longer . Can you please change my Topimax amd inderal to 90 day supplies and send them to my [facility] on file .      Thank you   Diana Ayala

## 2024-11-18 NOTE — TELEPHONE ENCOUNTER
Spoke with pt to clarify her dose of Topamax. She states that she takes 50 mg BID. Pended 90 day scripts for Topamax and propranolol in this med refill encounter and routed to provider to review. Last OV 10/1/24.

## 2024-11-19 ENCOUNTER — APPOINTMENT (OUTPATIENT)
Dept: LAB | Facility: HOSPITAL | Age: 59
End: 2024-11-19
Payer: COMMERCIAL

## 2024-11-19 ENCOUNTER — RESULTS FOLLOW-UP (OUTPATIENT)
Dept: FAMILY MEDICINE CLINIC | Facility: HOSPITAL | Age: 59
End: 2024-11-19

## 2024-11-19 DIAGNOSIS — E55.9 VITAMIN D DEFICIENCY: ICD-10-CM

## 2024-11-19 DIAGNOSIS — E04.9 GOITER: ICD-10-CM

## 2024-11-19 DIAGNOSIS — R79.89 ELEVATED PARATHYROID HORMONE: ICD-10-CM

## 2024-11-19 DIAGNOSIS — E83.52 HYPERCALCEMIA: ICD-10-CM

## 2024-11-19 DIAGNOSIS — R20.2 PARESTHESIA: ICD-10-CM

## 2024-11-19 DIAGNOSIS — E78.00 HYPERCHOLESTEROLEMIA: Primary | ICD-10-CM

## 2024-11-19 DIAGNOSIS — E55.9 VITAMIN D DEFICIENCY: Primary | ICD-10-CM

## 2024-11-19 DIAGNOSIS — E06.3 HYPOTHYROIDISM DUE TO HASHIMOTO'S THYROIDITIS: ICD-10-CM

## 2024-11-19 DIAGNOSIS — E78.00 HYPERCHOLESTEROLEMIA: ICD-10-CM

## 2024-11-19 LAB
25(OH)D3 SERPL-MCNC: 28.8 NG/ML (ref 30–100)
ALBUMIN SERPL BCG-MCNC: 4.2 G/DL (ref 3.5–5)
ALP SERPL-CCNC: 69 U/L (ref 34–104)
ALT SERPL W P-5'-P-CCNC: 15 U/L (ref 7–52)
ANION GAP SERPL CALCULATED.3IONS-SCNC: 8 MMOL/L (ref 4–13)
AST SERPL W P-5'-P-CCNC: 19 U/L (ref 13–39)
BILIRUB SERPL-MCNC: 0.3 MG/DL (ref 0.2–1)
BUN SERPL-MCNC: 17 MG/DL (ref 5–25)
CALCIUM SERPL-MCNC: 9.2 MG/DL (ref 8.4–10.2)
CHLORIDE SERPL-SCNC: 106 MMOL/L (ref 96–108)
CHOLEST SERPL-MCNC: 242 MG/DL (ref ?–200)
CO2 SERPL-SCNC: 27 MMOL/L (ref 21–32)
CREAT SERPL-MCNC: 0.95 MG/DL (ref 0.6–1.3)
FOLATE SERPL-MCNC: 13.6 NG/ML
GFR SERPL CREATININE-BSD FRML MDRD: 65 ML/MIN/1.73SQ M
GLUCOSE P FAST SERPL-MCNC: 100 MG/DL (ref 65–99)
HDLC SERPL-MCNC: 47 MG/DL
LDLC SERPL CALC-MCNC: 144 MG/DL (ref 0–100)
PHOSPHATE SERPL-MCNC: 3.1 MG/DL (ref 2.7–4.5)
POTASSIUM SERPL-SCNC: 3.9 MMOL/L (ref 3.5–5.3)
PROT SERPL-MCNC: 6.6 G/DL (ref 6.4–8.4)
PTH-INTACT SERPL-MCNC: 64.6 PG/ML (ref 12–88)
SODIUM SERPL-SCNC: 141 MMOL/L (ref 135–147)
T4 FREE SERPL-MCNC: 0.89 NG/DL (ref 0.61–1.12)
TRIGL SERPL-MCNC: 257 MG/DL (ref ?–150)
TSH SERPL DL<=0.05 MIU/L-ACNC: 2 UIU/ML (ref 0.45–4.5)
VIT B12 SERPL-MCNC: 493 PG/ML (ref 180–914)

## 2024-11-19 PROCEDURE — 84443 ASSAY THYROID STIM HORMONE: CPT

## 2024-11-19 PROCEDURE — 84439 ASSAY OF FREE THYROXINE: CPT

## 2024-11-19 PROCEDURE — 80053 COMPREHEN METABOLIC PANEL: CPT

## 2024-11-19 PROCEDURE — 83970 ASSAY OF PARATHORMONE: CPT

## 2024-11-19 PROCEDURE — 82746 ASSAY OF FOLIC ACID SERUM: CPT

## 2024-11-19 PROCEDURE — 84100 ASSAY OF PHOSPHORUS: CPT

## 2024-11-19 PROCEDURE — 82607 VITAMIN B-12: CPT

## 2024-11-19 PROCEDURE — 36415 COLL VENOUS BLD VENIPUNCTURE: CPT

## 2024-11-19 PROCEDURE — 80061 LIPID PANEL: CPT

## 2024-11-19 PROCEDURE — 82306 VITAMIN D 25 HYDROXY: CPT

## 2024-11-19 PROCEDURE — 84207 ASSAY OF VITAMIN B-6: CPT

## 2024-11-19 RX ORDER — PROPRANOLOL HCL 20 MG
20 TABLET ORAL 2 TIMES DAILY
Qty: 180 TABLET | Refills: 1 | Status: SHIPPED | OUTPATIENT
Start: 2024-11-19

## 2024-11-19 RX ORDER — TOPIRAMATE 50 MG/1
50 TABLET, FILM COATED ORAL 2 TIMES DAILY
Qty: 180 TABLET | Refills: 1 | Status: SHIPPED | OUTPATIENT
Start: 2024-11-19

## 2024-11-20 ENCOUNTER — RESULTS FOLLOW-UP (OUTPATIENT)
Dept: ENDOCRINOLOGY | Facility: HOSPITAL | Age: 59
End: 2024-11-20

## 2024-11-20 NOTE — RESULT ENCOUNTER NOTE
NOY DOMINGUEZ WANTED TO CHECK WITH YOU - SHE WAS TAKING PAXLOVID BECAUSE SHE WAS JUST GETTING OVER COVID AND SHE HAD JUST TAKEN HER LAST DOSE THE DAY BEFORE HER LAB WORK (SHE HAD A RIDE SO SHE WENT) SHE WAS ALSO TAKING OVER THE COUNTER MEDS TRYING TO DRY HER SINUSES UP.  ASKING BEFORE SHE MAKES AN APPT. DO YOU WANT TO RUN THE TESTING AGAIN TO SEE IF HER CHOLESTEROL IMPROVES

## 2024-11-22 ENCOUNTER — RESULTS FOLLOW-UP (OUTPATIENT)
Dept: NEUROLOGY | Facility: CLINIC | Age: 59
End: 2024-11-22

## 2024-11-22 LAB — VIT B6 SERPL-MCNC: 3.1 UG/L (ref 3.4–65.2)

## 2024-11-22 NOTE — TELEPHONE ENCOUNTER
Per Dr. Lema - Pt should repeat labs in 3 months - then follow up in office.     PT called and scheduled for March - labs are in epic - fasting.

## 2024-11-26 ENCOUNTER — TELEPHONE (OUTPATIENT)
Dept: NEUROLOGY | Facility: CLINIC | Age: 59
End: 2024-11-26

## 2024-11-26 NOTE — TELEPHONE ENCOUNTER
Received drug utilization review program form, for medications PROPRANOLOL HYDROCHLORIDE, VERAPAMIL HCL ER, forms have been scanned into the patients chart.

## 2024-12-19 DIAGNOSIS — E06.3 HYPOTHYROIDISM DUE TO HASHIMOTO'S THYROIDITIS: ICD-10-CM

## 2024-12-19 RX ORDER — LEVOTHYROXINE SODIUM 125 UG/1
TABLET ORAL
Qty: 90 TABLET | Refills: 0 | Status: SHIPPED | OUTPATIENT
Start: 2024-12-19

## 2024-12-31 ENCOUNTER — OFFICE VISIT (OUTPATIENT)
Dept: FAMILY MEDICINE CLINIC | Facility: HOSPITAL | Age: 59
End: 2024-12-31
Payer: COMMERCIAL

## 2024-12-31 VITALS
OXYGEN SATURATION: 97 % | TEMPERATURE: 100 F | BODY MASS INDEX: 31.45 KG/M2 | HEART RATE: 74 BPM | DIASTOLIC BLOOD PRESSURE: 68 MMHG | SYSTOLIC BLOOD PRESSURE: 100 MMHG | WEIGHT: 189 LBS

## 2024-12-31 DIAGNOSIS — R50.9 FEVER, UNSPECIFIED FEVER CAUSE: Primary | ICD-10-CM

## 2024-12-31 DIAGNOSIS — J40 BRONCHITIS: ICD-10-CM

## 2024-12-31 LAB
SARS-COV-2 AG UPPER RESP QL IA: NEGATIVE
SL AMB POCT RAPID FLU A: NORMAL
SL AMB POCT RAPID FLU B: NORMAL
VALID CONTROL: NORMAL

## 2024-12-31 PROCEDURE — 87804 INFLUENZA ASSAY W/OPTIC: CPT

## 2024-12-31 PROCEDURE — 87811 SARS-COV-2 COVID19 W/OPTIC: CPT

## 2024-12-31 PROCEDURE — 99213 OFFICE O/P EST LOW 20 MIN: CPT

## 2024-12-31 RX ORDER — PROMETHAZINE HYDROCHLORIDE AND CODEINE PHOSPHATE 6.25; 1 MG/5ML; MG/5ML
5 SYRUP ORAL EVERY 4 HOURS PRN
Qty: 118 ML | Refills: 0 | Status: SHIPPED | OUTPATIENT
Start: 2024-12-31

## 2024-12-31 NOTE — PROGRESS NOTES
Name: Brandy Ayala      : 1965      MRN: [id number]  Encounter Provider: Sam Verdin MD  Encounter Date: 2024   Encounter department: [facility] [address]  :  Assessment & Plan  Fever, unspecified fever cause    Orders:    POCT rapid flu A and B    POCT Rapid Covid Ag    Bronchitis  COVID and flu negative.  Suspect viral etiology.  ENT and lung exam normal without concern for bacterial infection.  Patient reports cough that is keeping her up at night.  She reported good symptom relief with promethazine-codeine cough syrup which she had from years ago but is now out.  Will represcribe.  Counseled on the risks of medication including sedation given the combined opioid and antihistamine properties.    Orders:    promethazine-codeine (PHENERGAN WITH CODEINE) 6.25-10 mg/5 mL syrup; Take 5 mL by mouth every 4 (four) hours as needed for cough           History of Present Illness     Cough  Associated symptoms include chills, a fever, headaches, postnasal drip, rhinorrhea and a sore throat. Pertinent negatives include no chest pain, ear pain, rash, shortness of breath or wheezing.     Symptoms started 2 days ago, chills, fever, body aches, cough, tmax 101.3.  Covid pos in November.        Review of Systems   Constitutional:  Positive for chills, diaphoresis, fatigue and fever.   HENT:  Positive for postnasal drip, rhinorrhea and sore throat. Negative for congestion, ear pain, sinus pressure and sinus pain.    Respiratory:  Positive for cough. Negative for shortness of breath and wheezing.    Cardiovascular:  Negative for chest pain.   Gastrointestinal:  Negative for diarrhea, nausea and vomiting.   Skin:  Negative for rash.   Neurological:  Positive for headaches. Negative for dizziness and numbness.       Objective   /68   Pulse 74   Temp 100 °F (37.8 °C)   Wt 85.7 kg (189 lb)   LMP  (LMP Unknown)   SpO2 97%   BMI 31.45 kg/m²      Physical Exam  Constitutional:        General: She is not in acute distress.     Appearance: Normal appearance. She is not ill-appearing, toxic-appearing or diaphoretic.   HENT:      Right Ear: Tympanic membrane, ear canal and external ear normal. There is no impacted cerumen.      Left Ear: Tympanic membrane, ear canal and external ear normal. There is no impacted cerumen.      Nose: Congestion present.      Mouth/Throat:      Mouth: Mucous membranes are moist.      Pharynx: Oropharynx is clear. Posterior oropharyngeal erythema present. No oropharyngeal exudate.   Cardiovascular:      Rate and Rhythm: Normal rate and regular rhythm.      Heart sounds: Normal heart sounds. No murmur heard.  Pulmonary:      Effort: Pulmonary effort is normal. No respiratory distress.      Breath sounds: Normal breath sounds. No stridor. No wheezing, rhonchi or rales.   Neurological:      Mental Status: She is alert and oriented to person, place, and time.   Psychiatric:         Mood and Affect: Mood normal.         Behavior: Behavior normal.

## 2025-02-04 ENCOUNTER — OFFICE VISIT (OUTPATIENT)
Dept: ENDOCRINOLOGY | Facility: HOSPITAL | Age: 60
End: 2025-02-04
Payer: COMMERCIAL

## 2025-02-04 VITALS
DIASTOLIC BLOOD PRESSURE: 70 MMHG | BODY MASS INDEX: 31.39 KG/M2 | SYSTOLIC BLOOD PRESSURE: 118 MMHG | OXYGEN SATURATION: 98 % | WEIGHT: 188.4 LBS | HEIGHT: 65 IN | HEART RATE: 68 BPM

## 2025-02-04 DIAGNOSIS — E06.3 HYPOTHYROIDISM DUE TO HASHIMOTO'S THYROIDITIS: Primary | ICD-10-CM

## 2025-02-04 DIAGNOSIS — E55.9 VITAMIN D DEFICIENCY: ICD-10-CM

## 2025-02-04 DIAGNOSIS — Z92.3 HISTORY OF RADIATION TO HEAD AND NECK REGION: ICD-10-CM

## 2025-02-04 DIAGNOSIS — R79.89 ELEVATED PARATHYROID HORMONE: ICD-10-CM

## 2025-02-04 PROCEDURE — 99214 OFFICE O/P EST MOD 30 MIN: CPT | Performed by: INTERNAL MEDICINE

## 2025-02-04 RX ORDER — VALACYCLOVIR HYDROCHLORIDE 1 G/1
1 TABLET, FILM COATED ORAL 2 TIMES DAILY
COMMUNITY
Start: 2024-11-10

## 2025-02-04 NOTE — PATIENT INSTRUCTIONS
The thyroid blood work is normal.     Continue the same levothyroxine dosage.     The vitamin d is slightly low, take the vitamin d daily.     The calcium and parathyroid are normal.     Continue to keep active and exercise.     I'll order the dexa scan.     Follow up in 1 year with blood work.

## 2025-02-04 NOTE — PROGRESS NOTES
2/4/2025    Assessment & Plan      Diagnoses and all orders for this visit:    Hypothyroidism due to Hashimoto's thyroiditis  -     Comprehensive metabolic panel; Future  -     T4, free; Future  -     Phosphorus; Future  -     PTH, intact; Future  -     TSH, 3rd generation; Future  -     Vitamin D 25 hydroxy; Future    Elevated parathyroid hormone  -     Comprehensive metabolic panel; Future  -     T4, free; Future  -     Phosphorus; Future  -     PTH, intact; Future  -     TSH, 3rd generation; Future  -     Vitamin D 25 hydroxy; Future  -     DXA bone density spine hip and pelvis; Future    Vitamin D deficiency  -     Comprehensive metabolic panel; Future  -     T4, free; Future  -     Phosphorus; Future  -     PTH, intact; Future  -     TSH, 3rd generation; Future  -     Vitamin D 25 hydroxy; Future  -     DXA bone density spine hip and pelvis; Future    History of radiation to head and neck region  -     Comprehensive metabolic panel; Future  -     T4, free; Future  -     Phosphorus; Future  -     PTH, intact; Future  -     TSH, 3rd generation; Future  -     Vitamin D 25 hydroxy; Future    Other orders  -     valACYclovir (VALTREX) 1,000 mg tablet; Take 1 tablet by mouth 2 (two) times a day        Assessment & Plan  1. Hypothyroidism due to Hashimoto's thyroiditis.  Her thyroid function appears to be within normal limits. She will maintain her current levothyroxine regimen of 125 mcg 1 pill 6 days a week, with no pill on Sunday.    2. Vitamin D deficiency.  Her vitamin D levels are marginally low. She will continue her daily intake of 5000 units of vitamin D without any interruptions as she has not been consistently taking her vitamin D..    3. Elevated parathyroid hormone level.  Her calcium and parathyroid levels are within the normal range. No changes to her current management plan are necessary.    4. History of lymphoma.  She has a history of radiation therapy on the left side of her neck for lymphoma  treatment. She reports constant slime dripping from the right nare and recurring headaches. She will follow up with her ENT specialist for further evaluation.    5. Cholesterol management.  Her cholesterol levels were abnormal during her last visit, possibly due to dietary changes while ill. She will have her cholesterol levels rechecked in March.    6. Numbness and tingling.  She reports tingling in her feet and numbness in her leg, which may be related to low B6 levels. She will continue her B6 supplementation.    7. Health maintenance.  A DEXA scan will be ordered as she is due for this test. She is advised to continue her physical activities and exercise regimen.    Follow-up  The patient will follow up in 1 year with blood work with preceding CMP, phosphorus, intact parathyroid hormone level, TSH, free T4, and 25-hydroxy vitamin D level.        CC: Hypothyroid, parathyroid, vitamin D deficiency follow-up    History of Present Illness    HPI: Brandy Ayala is a 59-year-old female with a history of hypothyroidism due to Hashimoto's thyroiditis with goiter, vitamin D deficiency, and elevated parathyroid hormone level.     She was diagnosed with Hashimoto's thyroiditis in 2003 with a TSH over 7 and started thyroid hormone for replacement purposes. She does have a history of a thyroid goiter. An ultrasound in 2017 showed a mild goiter with no nodules. She did have a mildly elevated parathyroid hormone level with normal calcium and vitamin D in November 2022 and has been followed over time with vitamin D replacement for a history of vitamin D deficiency. She does of note have a history of radiation therapy on the left side of her neck for lymphoma treatment.    She is currently on a regimen of levothyroxine 125 mcg, taken as one pill six days a week, with no pill on Sunday. She reports experiencing cold intolerance and frequent awakenings at night, leading to daytime fatigue. She also notes dry skin, brittle nails,  and significant hair loss. She does not experience any episodes of tachycardia or palpitations, attributing this stability to her use of Inderal for migraine management. She does not experience any tremors or hand shakiness. She does not experience any diplopia but reports difficulty swallowing, often choking on saliva and water.     She describes the presence of abnormal mucus and plans to consult an ENT specialist. She reports a constant mucus discharge from her right nostril. Her migraines, previously well-managed, have recently recurred. She recalls that her initial diagnosis was preceded by headaches and earaches, which were initially suspected to be due to ear infections. A subsequent CT scan revealed the true cause. She expresses concern about potential depression, citing her inability to drive as a contributing factor.     She has experienced weight loss since last year. She reports nocturia, waking up once or twice per night, but does not experience excessive thirst. She experiences constant body aches and suspects an undiagnosed autoimmune condition.     She reports frequent loose stools, described as mushy. She is supposed to be on cholestyramine but is not taking it.    She experiences tingling in her feet, which was found to be due to low B6 levels, and she was subsequently started on B6 supplements. She also reports hip pain and numbness in her leg, which she attributes to prolonged sitting. She experiences shooting pain and describes a sensation of pins and needles in her heel.    She is on vitamin D 5000 units daily but has not been consistently taking this.        Historical Information   Past Medical History:   Diagnosis Date    Cancer (HCC)     non-hogikins lymphoma     Colon polyp     Convergence insufficiency     bilateral eyes.    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 Now multiple times a week cor a few years    Movement disorder 2023    Mild leg tremors while in bed    Non Hodgkin's lymphoma (HCC) 2020    stage 1, had surgery and XRT to the neck( finished july 2020), rituximab in falll 2022 for recurrence in sinus    Overweight     Peripheral neuropathy 2015    Arms and feet    Retinitis pigmentosa     Shingles 12/23    Vision loss 1969    Head trauma from a fall     Past Surgical History:   Procedure Laterality Date    ANAL SPHINCTEROPLASTY      ANAL/SPHINCTEROPLASTY      ANTERIOR AND POSTERIOR VAGINAL REPAIR      BLADDER SUSPENSION      CATARACT EXTRACTION      COLONOSCOPY      DILATION AND CURETTAGE OF UTERUS      X 2    HYSTERECTOMY  2014    OOPHORECTOMY Bilateral 2014    with hysterectomy    TONSILLECTOMY AND ADENOIDECTOMY      UPPER GASTROINTESTINAL ENDOSCOPY      US GUIDED LYMPH NODE BIOPSY LEFT  04/21/2020     Social History   Social History     Substance and Sexual Activity   Alcohol Use Not Currently    Comment: At occasions     Social History     Substance and Sexual Activity   Drug Use Yes    Frequency: 7.0 times per week    Types: Marijuana    Comment: used for sleept used 10/16     Social History     Tobacco Use   Smoking Status Never   Smokeless Tobacco Never     Family History:   Family History   Problem Relation Age of Onset    Cancer Mother         lung    Lung cancer Mother 59    COPD Father     Substance Abuse Sister     HIV Sister     Drug abuse Sister     No Known Problems Daughter     No Known Problems Maternal Grandmother     Throat cancer Maternal Grandfather 73    Other Maternal Grandfather         Aortic valve replacement     No Known Problems Paternal Grandmother     No Known Problems Paternal Grandfather     Diabetes type II Brother         on  insulin    No Known Problems Paternal Aunt     Mental illness Neg Hx     Colon cancer Neg Hx     Colon polyps Neg Hx        Meds/Allergies   Current Outpatient Medications   Medication Sig Dispense Refill    calcium carbonate (Tums Ultra 1000) 1000 MG chewable tablet Takes 1 as needed      estradiol (ESTRACE) 0.5 MG tablet Take 1 mg by mouth daily      ibuprofen (MOTRIN) 200 mg tablet Take 600 mg by mouth every 6 (six) hours as needed for mild pain      levothyroxine 125 mcg tablet TAKE 1 TABLET 6 DAYS A WEEK AND 1/2 TABLET ON SUNDAY (Patient taking differently: Take 1 tablet 6 days a week and no tablet on sunday) 90 tablet 0    methocarbamol (ROBAXIN) 500 mg tablet Take 1 tablet (500 mg total) by mouth daily as needed for muscle spasms 30 tablet 5    Multiple Vitamins-Minerals (MULTIVITAMIN GUMMIES WOMENS) CHEW Chew 2 each daily      NON FORMULARY Cspsules--take 1 at bedtime( MMG)      omeprazole (PriLOSEC) 40 MG capsule TAKE 1 CAPSULE (40 MG TOTAL) BY MOUTH DAILY. 90 capsule 1    propranolol (INDERAL) 20 mg tablet Take 1 tablet (20 mg total) by mouth 2 (two) times a day 180 tablet 1    topiramate (TOPAMAX) 50 MG tablet Take 1 tablet (50 mg total) by mouth 2 (two) times a day 180 tablet 1    valACYclovir (VALTREX) 1,000 mg tablet Take 1 tablet by mouth 2 (two) times a day      verapamil (CALAN-SR) 120 mg CR tablet Take 120 mg by mouth in the morning      VITAMIN D, ERGOCALCIFEROL, PO Take 5,000 Units by mouth daily      ZOLMitriptan (ZOMIG-ZMT) 2.5 MG disintegrating tablet Take 1 tablet (2.5 mg total) by mouth daily as needed for migraine Take 1 tablet by mouth at the earliest onset of migraine. May repeat again in 2 hours if not completely headache free.  No more than 2 tablets in 24 hours. 18 tablet 5    cholestyramine (QUESTRAN) 4 g packet Take 1 packet (4 g total) by mouth daily (Patient not taking: Reported on 2/4/2025) 30 packet 12     No current facility-administered medications for this visit.     Allergies  "  Allergen Reactions    Latex Rash and Hives    Iodinated Contrast Media Sneezing     Sneezing, tongue tingling  Sneezing, tongue tingling      Gabapentin Edema    Lyrica [Pregabalin] Edema    Other      Other reaction(s): hair dye causes rash  Orange coating    Rituximab Itching and Other (See Comments)     Felt hot all over and restless    Lansoprazole Rash       Objective   Vitals: Blood pressure 118/70, pulse 68, height 5' 5\" (1.651 m), weight 85.5 kg (188 lb 6.4 oz), SpO2 98%.  Invasive Devices       None                   Physical Exam    No lid lag, stare, proptosis or periorbital edema. Negative Chvostek's sign.  Thyroid is normal in size. No palpable thyroid nodules.  Lungs are clear to auscultation.  Heart has a regular rate and rhythm. No murmurs.  No tremor of the outstretched hands. Patellar deep tendon reflexes are normal.      The history was obtained from the review of the chart and from the patient.    Lab Results:      Blood work performed on 11/19/2024 showed a CMP showed a glucose of 100 fasting, calcium 9.2 with an albumin of 4.2 but was otherwise normal..    Intact parathyroid hormone level 64.6.  Phosphorus is 3.1.    25-hydroxy vitamin D level is 28.8.    TSH is 1.996 with a free T4 of 0.89.    Lab Results   Component Value Date    CREATININE 0.95 11/19/2024    CREATININE 0.86 04/30/2024    CREATININE 0.94 12/19/2023    BUN 17 11/19/2024     03/10/2017    K 3.9 11/19/2024     11/19/2024    CO2 27 11/19/2024     GFR, Calculated   Date Value Ref Range Status   09/06/2018 92 >60 mL/min/1.73m2 Final     Comment:     mL/min per 1.73 square meters                                            Normal Function or Mild Renal    Disease (if clinically at risk):  >or=60  Moderately Decreased:                30-59  Severely Decreased:                  15-29  Renal Failure:                         <15                                            -American GFR: multiply reported GFR by " 1.16    Please note that the eGFR is based on the CKD-EPI calculation, and is not intended to be used for drug dosing.                                            Note: Calculated GFR may not be an accurate indicator of renal function if the patient's renal function is not in a steady state.    Ordering Provider: MAGGIE WESTBROOK  Report Copied to : RADHA BENNETT     eGFR   Date Value Ref Range Status   11/19/2024 65 ml/min/1.73sq m Final         Lab Results   Component Value Date    CHOL 198 03/10/2017    HDL 47 (L) 11/19/2024    TRIG 257 (H) 11/19/2024       Lab Results   Component Value Date    ALT 15 11/19/2024    AST 19 11/19/2024    ALKPHOS 69 11/19/2024       Lab Results   Component Value Date    TSH 1.84 12/14/2021    FREET4 0.89 11/19/2024             Future Appointments   Date Time Provider Department Center   3/4/2025  8:30 AM [facility]   3/11/2025  8:20 AM DO FRANK Narvaez  [facility]   4/1/2025 11:00 AM MANDY Weber [facility]   2/10/2026 10:40 AM Radha Bennett MD [facility]

## 2025-03-04 ENCOUNTER — APPOINTMENT (OUTPATIENT)
Dept: LAB | Facility: HOSPITAL | Age: 60
End: 2025-03-04
Payer: COMMERCIAL

## 2025-03-04 ENCOUNTER — HOSPITAL ENCOUNTER (OUTPATIENT)
Dept: MAMMOGRAPHY | Facility: IMAGING CENTER | Age: 60
Discharge: HOME | End: 2025-03-04
Payer: COMMERCIAL

## 2025-03-04 VITALS — WEIGHT: 185 LBS | HEIGHT: 65 IN | BODY MASS INDEX: 30.82 KG/M2

## 2025-03-04 DIAGNOSIS — E78.00 HYPERCHOLESTEROLEMIA: ICD-10-CM

## 2025-03-04 DIAGNOSIS — Z12.31 SCREENING MAMMOGRAM, ENCOUNTER FOR: ICD-10-CM

## 2025-03-04 LAB
CHOLEST SERPL-MCNC: 197 MG/DL (ref ?–200)
HDLC SERPL-MCNC: 60 MG/DL
LDLC SERPL CALC-MCNC: 111 MG/DL (ref 0–100)
TRIGL SERPL-MCNC: 129 MG/DL (ref ?–150)

## 2025-03-04 PROCEDURE — 77063 BREAST TOMOSYNTHESIS BI: CPT

## 2025-03-04 PROCEDURE — 80061 LIPID PANEL: CPT

## 2025-03-04 PROCEDURE — 77067 SCR MAMMO BI INCL CAD: CPT

## 2025-03-04 PROCEDURE — 36415 COLL VENOUS BLD VENIPUNCTURE: CPT

## 2025-03-06 ENCOUNTER — RESULTS FOLLOW-UP (OUTPATIENT)
Dept: FAMILY MEDICINE CLINIC | Facility: HOSPITAL | Age: 60
End: 2025-03-06

## 2025-03-08 DIAGNOSIS — E06.3 HYPOTHYROIDISM DUE TO HASHIMOTO'S THYROIDITIS: ICD-10-CM

## 2025-03-10 RX ORDER — LEVOTHYROXINE SODIUM 125 UG/1
TABLET ORAL
Qty: 90 TABLET | Refills: 1 | Status: SHIPPED | OUTPATIENT
Start: 2025-03-10

## 2025-03-11 ENCOUNTER — OFFICE VISIT (OUTPATIENT)
Dept: FAMILY MEDICINE CLINIC | Facility: HOSPITAL | Age: 60
End: 2025-03-11
Payer: COMMERCIAL

## 2025-03-11 VITALS
HEIGHT: 65 IN | DIASTOLIC BLOOD PRESSURE: 68 MMHG | OXYGEN SATURATION: 99 % | BODY MASS INDEX: 30.82 KG/M2 | HEART RATE: 66 BPM | WEIGHT: 185 LBS | SYSTOLIC BLOOD PRESSURE: 118 MMHG

## 2025-03-11 DIAGNOSIS — C82.31 GRADE 3A FOLLICULAR LYMPHOMA OF LYMPH NODES OF NECK (HCC): ICD-10-CM

## 2025-03-11 DIAGNOSIS — Z86.018 HISTORY OF CHANGING SKIN MOLE: ICD-10-CM

## 2025-03-11 DIAGNOSIS — Q99.8 OTHER SPECIFIED CHROMOSOME ABNORMALITIES: ICD-10-CM

## 2025-03-11 DIAGNOSIS — E78.00 HYPERCHOLESTEROLEMIA: ICD-10-CM

## 2025-03-11 DIAGNOSIS — E66.09 CLASS 1 OBESITY DUE TO EXCESS CALORIES WITHOUT SERIOUS COMORBIDITY WITH BODY MASS INDEX (BMI) OF 31.0 TO 31.9 IN ADULT: ICD-10-CM

## 2025-03-11 DIAGNOSIS — Z00.00 ANNUAL PHYSICAL EXAM: Primary | ICD-10-CM

## 2025-03-11 DIAGNOSIS — E06.3 HYPOTHYROIDISM DUE TO HASHIMOTO'S THYROIDITIS: ICD-10-CM

## 2025-03-11 DIAGNOSIS — I47.10 SUPRAVENTRICULAR TACHYCARDIA (HCC): ICD-10-CM

## 2025-03-11 DIAGNOSIS — E66.811 CLASS 1 OBESITY DUE TO EXCESS CALORIES WITHOUT SERIOUS COMORBIDITY WITH BODY MASS INDEX (BMI) OF 31.0 TO 31.9 IN ADULT: ICD-10-CM

## 2025-03-11 PROCEDURE — 99396 PREV VISIT EST AGE 40-64: CPT | Performed by: INTERNAL MEDICINE

## 2025-03-11 RX ORDER — LANOLIN ALCOHOL/MO/W.PET/CERES
50 CREAM (GRAM) TOPICAL DAILY
COMMUNITY

## 2025-03-11 NOTE — PATIENT INSTRUCTIONS
"Patient Education     Routine physical for adults   The Basics   Written by the doctors and editors at [facility]   What is a physical? -- A physical is a routine visit, or \"check-up,\" with your doctor. You might also hear it called a \"wellness visit\" or \"preventive visit.\"  During each visit, the doctor will:   Ask about your physical and mental health   Ask about your habits, behaviors, and lifestyle   Do an exam   Give you vaccines if needed   Talk to you about any medicines you take   Give advice about your health   Answer your questions  Getting regular check-ups is an important part of taking care of your health. It can help your doctor find and treat any problems you have. But it's also important for preventing health problems.  A routine physical is different from a \"sick visit.\" A sick visit is when you see a doctor because of a health concern or problem. Since physicals are scheduled ahead of time, you can think about what you want to ask the doctor.  How often should I get a physical? -- It depends on your age and health. In general, for people age 21 years and older:   If you are younger than 50 years, you might be able to get a physical every 3 years.   If you are 50 years or older, your doctor might recommend a physical every year.  If you have an ongoing health condition, like diabetes or high blood pressure, your doctor will probably want to see you more often.  What happens during a physical? -- In general, each visit will include:   Physical exam - The doctor or nurse will check your height, weight, heart rate, and blood pressure. They will also look at your eyes and ears. They will ask about how you are feeling and whether you have any symptoms that bother you.   Medicines - It's a good idea to bring a list of all the medicines you take to each doctor visit. Your doctor will talk to you about your medicines and answer any questions. Tell them if you are having any side effects that bother you. You " "should also tell them if you are having trouble paying for any of your medicines.   Habits and behaviors - This includes:   Your diet   Your exercise habits   Whether you smoke, drink alcohol, or use drugs   Whether you are sexually active   Whether you feel safe at home  Your doctor will talk to you about things you can do to improve your health and lower your risk of health problems. They will also offer help and support. For example, if you want to quit smoking, they can give you advice and might prescribe medicines. If you want to improve your diet or get more physical activity, they can help you with this, too.   Lab tests, if needed - The tests you get will depend on your age and situation. For example, your doctor might want to check your:   Cholesterol   Blood sugar   Iron level   Vaccines - The recommended vaccines will depend on your age, health, and what vaccines you already had. Vaccines are very important because they can prevent certain serious or deadly infections.   Discussion of screening - \"Screening\" means checking for diseases or other health problems before they cause symptoms. Your doctor can recommend screening based on your age, risk, and preferences. This might include tests to check for:   Cancer, such as breast, prostate, cervical, ovarian, colorectal, prostate, lung, or skin cancer   Sexually transmitted infections, such as chlamydia and gonorrhea   Mental health conditions like depression and anxiety  Your doctor will talk to you about the different types of screening tests. They can help you decide which screenings to have. They can also explain what the results might mean.   Answering questions - The physical is a good time to ask the doctor or nurse questions about your health. If needed, they can refer you to other doctors or specialists, too.  Adults older than 65 years often need other care, too. As you get older, your doctor will talk to you about:   How to prevent falling at " home   Hearing or vision tests   Memory testing   How to take your medicines safely   Making sure that you have the help and support you need at home  All topics are updated as new evidence becomes available and our peer review process is complete.  This topic retrieved from [employer] on: May 02, 2024.  Topic 362830 Version 1.0  Release: 32.4.3 - C32.122  © 2024 UpToDate, Inc. and/or its affiliates. All rights reserved.  Consumer Information Use and Disclaimer   Disclaimer: This generalized information is a limited summary of diagnosis, treatment, and/or medication information. It is not meant to be comprehensive and should be used as a tool to help the user understand and/or assess potential diagnostic and treatment options. It does NOT include all information about conditions, treatments, medications, side effects, or risks that may apply to a specific patient. It is not intended to be medical advice or a substitute for the medical advice, diagnosis, or treatment of a health care provider based on the health care provider's examination and assessment of a patient's specific and unique circumstances. Patients must speak with a health care provider for complete information about their health, medical questions, and treatment options, including any risks or benefits regarding use of medications. This information does not endorse any treatments or medications as safe, effective, or approved for treating a specific patient. UpToDate, Inc. and its affiliates disclaim any warranty or liability relating to this information or the use thereof.The use of this information is governed by the Terms of Use, available at https://www.woltersEvtronuwer.com/en/know/clinical-effectiveness-terms. 2024© UpToDate, Inc. and its affiliates and/or licensors. All rights reserved.  Copyright   © 2024 Up[employer]Date, Inc. and/or its affiliates. All rights reserved.

## 2025-03-11 NOTE — PROGRESS NOTES
`Adult Annual Physical  Name: Brandy Ayala      : 1965      MRN: [id number]  Encounter Provider: Marti Lema DO  Encounter Date: 3/11/2025   Encounter department: [facility] [address]    Assessment & Plan  Annual physical exam         Hypercholesterolemia    Orders:  •  CBC and differential; Future  •  Comprehensive metabolic panel; Future  •  Lipid Panel with Direct LDL reflex; Future    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)         Hypothyroidism due to Hashimoto's thyroiditis         Class 1 obesity due to excess calories without serious comorbidity with body mass index (BMI) of 31.0 to 31.9 in adult         History of changing skin mole    Orders:  •  Ambulatory Referral to Dermatology; Future    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       Supraventricular tachycardia (HCC)  No recent issues- improved with inderal which she is on for migraines- cut down form 40 mg to 20 mg daily   Now on low dose verapamil and beta blocker       Preventive Screenings:  - Diabetes Screening: screening up-to-date  - Cholesterol Screening: screening not indicated and has hyperlipidemia   - Cervical cancer screening: screening not indicated   - Breast cancer screening: screening up-to-date   - Colon cancer screening: screening up-to-date   - Lung cancer screening: screening not indicated     Had elevated cholesterol  after covid and paxlovid-now markedly improved     Immunizations:  - Immunizations due: Influenza, Prevnar 20, Tdap and Zoster (Shingrix)      Depression Screening and Follow-up Plan: Patient was screened for depression during today's encounter. They screened negative with a PHQ-2 score of 0.          History of Present Illness     Adult Annual Physical:  Patient presents for annual physical.     Diet and Physical Activity:  - Diet/Nutrition: no special diet and portion control.  - Exercise: walking and 5-7 times a  week on average.    Depression Screening:  - PHQ-2 Score: 0    General Health:  - Sleep: sleeps poorly and 4-6 hours of sleep on average.  - Hearing: normal hearing bilateral ears.  - Vision: wears glasses. see specialist at [facility]  - Dental: regular dental visits, brushes teeth twice daily and floss regularly.    /GYN Health:  - Follows with GYN: yes.   - Menopause: postmenopausal.   - History of STDs: no  - Contraception:. goes every 2 years      Advanced Care Planning:  - Has an advanced directive?: yes    - Has a durable medical POA?: yes    - ACP document given to patient?: no      Review of Systems   Constitutional:         Labs reviewed- is now on 5000 untis of vit d    HENT:  Negative for congestion.    Respiratory:  Negative for shortness of breath.    Cardiovascular:  Negative for chest pain and palpitations.   All other systems reviewed and are negative.    Current Outpatient Medications on File Prior to Visit   Medication Sig Dispense Refill   • estradiol (ESTRACE) 0.5 MG tablet Take 1 mg by mouth daily     • ibuprofen (MOTRIN) 200 mg tablet Take 600 mg by mouth every 6 (six) hours as needed for mild pain     • levothyroxine 125 mcg tablet TAKE 1 TABLET 6 DAYS A WEEK AND 1/2 TABLET ON SUNDAY (Patient taking differently: Take 1 tablet 6 days a week) 90 tablet 1   • methocarbamol (ROBAXIN) 500 mg tablet Take 1 tablet (500 mg total) by mouth daily as needed for muscle spasms 30 tablet 5   • Multiple Vitamins-Minerals (MULTIVITAMIN GUMMIES WOMENS) CHEW Chew 2 each daily     • NON FORMULARY Cspsules--take 1 at bedtime( MMG)     • omeprazole (PriLOSEC) 40 MG capsule TAKE 1 CAPSULE (40 MG TOTAL) BY MOUTH DAILY. 90 capsule 1   • propranolol (INDERAL) 20 mg tablet Take 1 tablet (20 mg total) by mouth 2 (two) times a day 180 tablet 1   • pyridoxine (VITAMIN B6) 50 mg tablet Take 50 mg by mouth daily     • topiramate (TOPAMAX) 50 MG tablet Take 1 tablet (50 mg total) by mouth 2 (two) times a day 180 tablet 1   •  "valACYclovir (VALTREX) 1,000 mg tablet Take 1 tablet by mouth 2 (two) times a day     • verapamil (CALAN-SR) 120 mg CR tablet Take 120 mg by mouth in the morning     • VITAMIN D, ERGOCALCIFEROL, PO Take 5,000 Units by mouth daily     • ZOLMitriptan (ZOMIG-ZMT) 2.5 MG disintegrating tablet Take 1 tablet (2.5 mg total) by mouth daily as needed for migraine Take 1 tablet by mouth at the earliest onset of migraine. May repeat again in 2 hours if not completely headache free.  No more than 2 tablets in 24 hours. 18 tablet 5   • cholestyramine (QUESTRAN) 4 g packet Take 1 packet (4 g total) by mouth daily (Patient not taking: Reported on 3/11/2025) 30 packet 12   • [DISCONTINUED] calcium carbonate (Tums Ultra 1000) 1000 MG chewable tablet Takes 1 as needed (Patient not taking: Reported on 3/11/2025)       No current facility-administered medications on file prior to visit.      Social History     Tobacco Use   • Smoking status: Never   • Smokeless tobacco: Never   Vaping Use   • Vaping status: Never Used   Substance and Sexual Activity   • Alcohol use: Not Currently     Comment: At occasions   • Drug use: Yes     Frequency: 7.0 times per week     Types: Marijuana     Comment: used for sleept used 10/16   • Sexual activity: Yes     Partners: Male     Birth control/protection: Male Sterilization, Female Sterilization       Objective   /68   Pulse 66   Ht 5' 5\" (1.651 m)   Wt 83.9 kg (185 lb)   LMP  (LMP Unknown)   SpO2 99%   BMI 30.79 kg/m²      Physical Exam  Vitals and nursing note reviewed.   Constitutional:       General: She is not in acute distress.  HENT:      Head: Normocephalic.      Right Ear: Tympanic membrane normal. There is no impacted cerumen.      Left Ear: Tympanic membrane normal. There is no impacted cerumen.      Nose: No congestion.      Mouth/Throat:      Pharynx: No oropharyngeal exudate or posterior oropharyngeal erythema.   Eyes:      General:         Right eye: No discharge. "   Cardiovascular:      Rate and Rhythm: Normal rate and regular rhythm.      Heart sounds: No murmur heard.  Pulmonary:      Breath sounds: No wheezing or rhonchi.   Abdominal:      Palpations: Abdomen is soft.      Tenderness: There is no abdominal tenderness. There is no guarding.   Musculoskeletal:         General: No tenderness.      Cervical back: No tenderness.      Right lower leg: No edema.      Left lower leg: No edema.   Lymphadenopathy:      Cervical: No cervical adenopathy.   Skin:     Findings: No rash.   Neurological:      Mental Status: She is alert.      Motor: No weakness.   Psychiatric:         Mood and Affect: Mood normal.         Thought Content: Thought content normal.

## 2025-03-11 NOTE — ASSESSMENT & PLAN NOTE
No recent issues- improved with inderal which she is on for migraines- cut down form 40 mg to 20 mg daily   Now on low dose verapamil and beta blocker

## 2025-03-19 ENCOUNTER — TELEPHONE (OUTPATIENT)
Age: 60
End: 2025-03-19

## 2025-03-19 NOTE — TELEPHONE ENCOUNTER
Called and left message for patient regarding scheduling for ASAP referral.    Requested return call to office if patient needed to be seen.    Phone number left in message.

## 2025-03-25 ENCOUNTER — TELEPHONE (OUTPATIENT)
Age: 60
End: 2025-03-25

## 2025-03-25 NOTE — TELEPHONE ENCOUNTER
Pt called to advise she cannot see Klarissa in new office in [place] and needs to follow up in [place], Pt was scheduled for first available with Misti Mcdonnell on August 20th at 2 pm.

## 2025-04-06 DIAGNOSIS — K21.9 GERD WITHOUT ESOPHAGITIS: ICD-10-CM

## 2025-04-07 RX ORDER — OMEPRAZOLE 40 MG/1
40 CAPSULE, DELAYED RELEASE ORAL DAILY
Qty: 90 CAPSULE | Refills: 1 | Status: SHIPPED | OUTPATIENT
Start: 2025-04-07

## 2025-04-25 ENCOUNTER — TELEPHONE (OUTPATIENT)
Age: 60
End: 2025-04-25

## 2025-04-25 NOTE — TELEPHONE ENCOUNTER
=Pt daughter is giving birth soon and pt would like to know if she need any vaccines or boosters before the baby arrives? Please advise

## 2025-04-29 DIAGNOSIS — Z01.84 IMMUNITY STATUS TESTING: Primary | ICD-10-CM

## 2025-04-30 NOTE — TELEPHONE ENCOUNTER
LDM that MMR immunity test was ordered and can go to lab to have drawn. Advised to call if she has any further questions or concerns   No

## 2025-05-14 DIAGNOSIS — G43.009 MIGRAINE WITHOUT AURA AND WITHOUT STATUS MIGRAINOSUS, NOT INTRACTABLE: ICD-10-CM

## 2025-05-14 RX ORDER — PROPRANOLOL HCL 20 MG
20 TABLET ORAL 2 TIMES DAILY
Qty: 180 TABLET | Refills: 0 | Status: SHIPPED | OUTPATIENT
Start: 2025-05-14

## 2025-05-21 DIAGNOSIS — G43.009 MIGRAINE WITHOUT AURA AND WITHOUT STATUS MIGRAINOSUS, NOT INTRACTABLE: ICD-10-CM

## 2025-05-21 RX ORDER — TOPIRAMATE 50 MG/1
50 TABLET, FILM COATED ORAL 2 TIMES DAILY
Qty: 180 TABLET | Refills: 0 | Status: SHIPPED | OUTPATIENT
Start: 2025-05-21